# Patient Record
Sex: MALE | Race: WHITE | NOT HISPANIC OR LATINO | Employment: FULL TIME | URBAN - METROPOLITAN AREA
[De-identification: names, ages, dates, MRNs, and addresses within clinical notes are randomized per-mention and may not be internally consistent; named-entity substitution may affect disease eponyms.]

---

## 2017-02-01 ENCOUNTER — OFFICE VISIT (OUTPATIENT)
Dept: CARDIOLOGY | Facility: CLINIC | Age: 57
End: 2017-02-01

## 2017-02-01 VITALS
DIASTOLIC BLOOD PRESSURE: 60 MMHG | BODY MASS INDEX: 31.3 KG/M2 | HEIGHT: 70 IN | HEART RATE: 80 BPM | SYSTOLIC BLOOD PRESSURE: 90 MMHG | WEIGHT: 218.6 LBS

## 2017-02-01 DIAGNOSIS — I42.9 CARDIOMYOPATHY (HCC): Primary | ICD-10-CM

## 2017-02-01 DIAGNOSIS — I10 ESSENTIAL HYPERTENSION: ICD-10-CM

## 2017-02-01 DIAGNOSIS — E78.00 PURE HYPERCHOLESTEROLEMIA: ICD-10-CM

## 2017-02-01 PROCEDURE — 99213 OFFICE O/P EST LOW 20 MIN: CPT | Performed by: INTERNAL MEDICINE

## 2017-02-01 PROCEDURE — 93289 INTERROG DEVICE EVAL HEART: CPT | Performed by: INTERNAL MEDICINE

## 2017-02-01 NOTE — PROGRESS NOTES
"Subjective:     Encounter Date:02/01/2017      Patient ID: Arash Simmons is a 56 y.o. male.    Chief Complaint: Follow up of cardiomyopathy    1. Nonischemic cardiomyopathy:  a.  On 04/07/2015, abnormal myocardial perfusion study with evidence of dilated cardiomyopathy, ejection fraction of 16%, large fixed perfusion defect in the anterior apex, consistent with prior myocardial infarction.   b. On 05/08/2015, cardiac catheterization  with EF of 10% to 15%.  Normal coronary arteries.  Severe left ventricular dilatation.    c. Echo, 07/08/2015, LVEF 20%.   d. Status post biventricular ICD placement, August 2015.  2. History of congestive heart failure:  a.  By patient description, a questionable history of nonischemic cardiomyopathy, incomplete database.   3.  Left bundle branch block.   4. Hypertension.   5. Dyslipidemia.   6. Anxiety.   7. Questionable sleep apnea.   8. Abdominal/intestinal surgery as a child.     History of Present Illness  Patient returns today for annual follow-up of nonischemic cardio myopathy and by the ICD check.  Since her last visit, he is doing very well, his active.  Is limited still by his knee pain, but \"is able to tolerate it\".  No dizziness lightheadedness chest pain shortness breath orthopnea PND palpitations or any cardiovascular symptoms.    No Known Allergies      Current Outpatient Prescriptions:   •  aspirin 81 MG tablet, Take  by mouth daily., Disp: , Rfl:   •  atorvastatin (LIPITOR) 10 MG tablet, Take  by mouth., Disp: , Rfl:   •  carvedilol (COREG) 12.5 MG tablet, Take 1 tablet by mouth 2 (Two) Times a Day With Meals., Disp: 180 tablet, Rfl: 3  •  Cholecalciferol (VITAMIN D3) 2000 UNITS capsule, Take 1 capsule by mouth daily., Disp: , Rfl:   •  FLUoxetine (PROzac) 20 MG tablet, Take 1 tablet by mouth daily., Disp: 90 tablet, Rfl: 1  •  fluticasone (FLOVENT HFA) 110 MCG/ACT inhaler, Inhale 1 puff 2 (two) times a day., Disp: 1 inhaler, Rfl: 0  •  furosemide (LASIX) 40 MG " "tablet, TAKE ONE TABLET BY MOUTH ONCE DAILY, Disp: 90 tablet, Rfl: 3  •  ramipril (ALTACE) 2.5 MG capsule, TAKE ONE CAPSULE BY MOUTH TWICE DAILY, Disp: 180 capsule, Rfl: 2  •  spironolactone (ALDACTONE) 25 MG tablet, Take  by mouth daily., Disp: , Rfl:     The following portions of the patient's history were reviewed and updated as appropriate: allergies, current medications, past family history, past medical history, past social history, past surgical history and problem list.    Review of Systems   Constitution: Negative.   Cardiovascular: Negative.    Respiratory: Negative.    Hematologic/Lymphatic: Negative for bleeding problem. Does not bruise/bleed easily.   Skin: Negative for rash.   Musculoskeletal: Negative for muscle weakness and myalgias.   Gastrointestinal: Negative for heartburn, nausea and vomiting.   Neurological: Negative.           Objective:   Blood pressure 90/60, pulse 80, height 70\" (177.8 cm), weight 218 lb 9.6 oz (99.2 kg).      Physical Exam   Constitutional: He is oriented to person, place, and time. He appears well-developed and well-nourished.   Neck: No JVD present. Carotid bruit is not present. No thyromegaly present.   Cardiovascular: Regular rhythm, S1 normal, S2 normal, normal heart sounds and intact distal pulses.  Exam reveals no gallop, no S3 and no S4.    No murmur heard.  Pulses:       Carotid pulses are 2+ on the right side, and 2+ on the left side.       Radial pulses are 2+ on the right side, and 2+ on the left side.   Pulmonary/Chest: Breath sounds normal.   Abdominal: Soft. Bowel sounds are normal. He exhibits no mass. There is no tenderness.   Musculoskeletal: He exhibits edema (Venous varicosities bilateral).   Neurological: He is alert and oriented to person, place, and time.   Skin: Skin is warm and dry. No rash noted.       Lab Review:    Procedures  Biventricular ICD: Normal function..  Rate histograms.  No significant arrhythmias.  No changes      Assessment: "   Arash was seen today for follow-up and hypertension.    Diagnoses and all orders for this visit:    Cardiomyopathy    Essential hypertension    Pure hypercholesterolemia        Impression  1. Nonischemic cardio myopathy.  Currently euvolemic functional class I  2. Hypertension well controlled  3. Dyslipidemia controlled  4. Normal functioning by the ICD    Plan:  1. No change in medications today.  Patient is stable  2. Check CMP and fasting lipids  3. Revisit in 16 MO, or sooner as needed.    Pb Naqvi MD

## 2017-02-02 ENCOUNTER — LAB (OUTPATIENT)
Dept: LAB | Facility: HOSPITAL | Age: 57
End: 2017-02-02

## 2017-02-02 DIAGNOSIS — E78.00 PURE HYPERCHOLESTEROLEMIA: ICD-10-CM

## 2017-02-02 DIAGNOSIS — I10 ESSENTIAL HYPERTENSION: ICD-10-CM

## 2017-02-02 LAB
ALBUMIN SERPL-MCNC: 4.5 G/DL (ref 3.2–4.8)
ALBUMIN/GLOB SERPL: 1.5 G/DL (ref 1.5–2.5)
ALP SERPL-CCNC: 68 U/L (ref 25–100)
ALT SERPL W P-5'-P-CCNC: 20 U/L (ref 7–40)
ANION GAP SERPL CALCULATED.3IONS-SCNC: 4 MMOL/L (ref 3–11)
ARTICHOKE IGE QN: 80 MG/DL (ref 0–130)
AST SERPL-CCNC: 24 U/L (ref 0–33)
BILIRUB SERPL-MCNC: 0.9 MG/DL (ref 0.3–1.2)
BUN BLD-MCNC: 17 MG/DL (ref 9–23)
BUN/CREAT SERPL: 18.9 (ref 7–25)
CALCIUM SPEC-SCNC: 10 MG/DL (ref 8.7–10.4)
CHLORIDE SERPL-SCNC: 106 MMOL/L (ref 99–109)
CHOLEST SERPL-MCNC: 139 MG/DL (ref 0–200)
CO2 SERPL-SCNC: 31 MMOL/L (ref 20–31)
CREAT BLD-MCNC: 0.9 MG/DL (ref 0.6–1.3)
GFR SERPL CREATININE-BSD FRML MDRD: 88 ML/MIN/1.73
GLOBULIN UR ELPH-MCNC: 3.1 GM/DL
GLUCOSE BLD-MCNC: 104 MG/DL (ref 70–100)
HDLC SERPL-MCNC: 40 MG/DL (ref 40–60)
POTASSIUM BLD-SCNC: 4.4 MMOL/L (ref 3.5–5.5)
PROT SERPL-MCNC: 7.6 G/DL (ref 5.7–8.2)
SODIUM BLD-SCNC: 141 MMOL/L (ref 132–146)
TRIGL SERPL-MCNC: 107 MG/DL (ref 0–150)

## 2017-02-02 PROCEDURE — 80053 COMPREHEN METABOLIC PANEL: CPT | Performed by: INTERNAL MEDICINE

## 2017-02-02 PROCEDURE — 80061 LIPID PANEL: CPT | Performed by: INTERNAL MEDICINE

## 2017-02-20 RX ORDER — ATORVASTATIN CALCIUM 10 MG/1
TABLET, FILM COATED ORAL
Qty: 14 TABLET | Refills: 0 | Status: SHIPPED | OUTPATIENT
Start: 2017-02-20 | End: 2017-03-16 | Stop reason: SDUPTHER

## 2017-03-06 ENCOUNTER — OFFICE VISIT (OUTPATIENT)
Dept: FAMILY MEDICINE CLINIC | Facility: CLINIC | Age: 57
End: 2017-03-06

## 2017-03-06 VITALS
BODY MASS INDEX: 31.55 KG/M2 | DIASTOLIC BLOOD PRESSURE: 65 MMHG | SYSTOLIC BLOOD PRESSURE: 122 MMHG | RESPIRATION RATE: 16 BRPM | WEIGHT: 220.4 LBS | HEART RATE: 76 BPM | HEIGHT: 70 IN | TEMPERATURE: 98 F

## 2017-03-06 DIAGNOSIS — H61.23 BILATERAL IMPACTED CERUMEN: Primary | ICD-10-CM

## 2017-03-06 DIAGNOSIS — F41.9 ANXIETY: ICD-10-CM

## 2017-03-06 PROCEDURE — 99213 OFFICE O/P EST LOW 20 MIN: CPT | Performed by: PHYSICIAN ASSISTANT

## 2017-03-06 PROCEDURE — 69209 REMOVE IMPACTED EAR WAX UNI: CPT | Performed by: PHYSICIAN ASSISTANT

## 2017-03-06 RX ORDER — FLUOXETINE 20 MG/1
20 TABLET, FILM COATED ORAL DAILY
Qty: 90 TABLET | Refills: 1 | Status: SHIPPED | OUTPATIENT
Start: 2017-03-06 | End: 2017-03-21 | Stop reason: SDUPTHER

## 2017-03-06 NOTE — PROGRESS NOTES
"Samuel Simmons is a 56 y.o. male.     History of Present Illness   Patient presents with CC of decreased hearing and ears feeling blocked. Pt has hx of cerumen impaction that requires ear cleaning. Pt does not use Q-tips or ear plugs that would exacerbate impactions. No symptoms of congestion, drainage, sore throat, HA, sinus pressure, cough or fever   Pt also needs refill of Prozac for anxiety. Doing well on medication. No concerns.     The following portions of the patient's history were reviewed and updated as appropriate: allergies, current medications, past family history, past medical history, past social history, past surgical history and problem list.    Review of Systems   Constitutional: Negative.  Negative for chills, diaphoresis, fatigue and fever.   HENT: Positive for hearing loss. Negative for congestion, ear discharge, ear pain, nosebleeds, postnasal drip, sinus pressure, sneezing and sore throat.         Ears feel \"full\"   Eyes: Negative.    Respiratory: Negative.  Negative for cough, chest tightness, shortness of breath and wheezing.    Cardiovascular: Negative.  Negative for chest pain, palpitations and leg swelling.   Gastrointestinal: Negative for abdominal distention, abdominal pain, anal bleeding, blood in stool, constipation, diarrhea, nausea, rectal pain and vomiting.   Endocrine: Negative.  Negative for cold intolerance, heat intolerance, polydipsia, polyphagia and polyuria.   Genitourinary: Negative.  Negative for difficulty urinating, dysuria, flank pain, frequency, hematuria and urgency.   Musculoskeletal: Negative.  Negative for arthralgias, back pain, gait problem, joint swelling, myalgias, neck pain and neck stiffness.   Skin: Negative.  Negative for color change, pallor, rash and wound.   Allergic/Immunologic: Negative.  Negative for immunocompromised state.   Neurological: Negative for dizziness, syncope, weakness, light-headedness, numbness and headaches. " "  Hematological: Negative.  Negative for adenopathy. Does not bruise/bleed easily.   Psychiatric/Behavioral: Negative.  Negative for behavioral problems, confusion, self-injury, sleep disturbance and suicidal ideas. The patient is not nervous/anxious.        Objective    Blood pressure 122/65, pulse 76, temperature 98 °F (36.7 °C), resp. rate 16, height 70\" (177.8 cm), weight 220 lb 6.4 oz (100 kg).     Physical Exam   Constitutional: He is oriented to person, place, and time. He appears well-developed and well-nourished.   HENT:   Head: Normocephalic and atraumatic.   Right Ear: Tympanic membrane, external ear and ear canal normal.   Left Ear: Tympanic membrane, external ear and ear canal normal.   Nose: Nose normal.   Mouth/Throat: Oropharynx is clear and moist. No oropharyngeal exudate.   TMs and canals normal post lavage due to bilateral cerumen impaction    Eyes: Conjunctivae and EOM are normal. Pupils are equal, round, and reactive to light.   Neck: Normal range of motion. Neck supple. No tracheal deviation present. No thyromegaly present.   Cardiovascular: Normal rate, regular rhythm, normal heart sounds and intact distal pulses.    Pacemaker device palpated    Pulmonary/Chest: Effort normal and breath sounds normal. No respiratory distress. He has no wheezes. He has no rales. He exhibits no tenderness.   Lymphadenopathy:     He has no cervical adenopathy.   Neurological: He is alert and oriented to person, place, and time.   Skin: Skin is warm and dry.   Psychiatric: He has a normal mood and affect. His behavior is normal. Judgment and thought content normal.       Ear Cerumen Removal Lavage  Date/Time: 3/6/2017 4:32 PM  Performed by: ELVIS WALSH  Authorized by: PAYAM MCKEON   Consent: Verbal consent obtained. Written consent not obtained.  Risks and benefits: risks, benefits and alternatives were discussed  Consent given by: patient  Patient understanding: patient states understanding of the " procedure being performed  Patient consent: the patient's understanding of the procedure matches consent given  Procedure consent: procedure consent matches procedure scheduled  Patient identity confirmed: verbally with patient    Anesthesia:  Local Anesthetic: none   Ceruminolytics applied: Ceruminolytics applied prior to the procedure.  Location: both ears.  Procedure type: irrigation  Sedation:  Patient sedated: no    Patient tolerance: Patient tolerated the procedure well with no immediate complications  Comments: Successful ear cerumen removal bilaterally         Assessment/Plan   Arash was seen today for l ear feels blocked and rf:  prozac.    Diagnoses and all orders for this visit:    Bilateral impacted cerumen  -     Ear Cerumen Removal Lavage    Anxiety  -     FLUoxetine (PROzac) 20 MG tablet; Take 1 tablet by mouth Daily.      Refill on prozac provided   Suggest regular use of Debrox to avoid impactions. No Q-tips.   F/U PRN

## 2017-03-17 RX ORDER — ATORVASTATIN CALCIUM 10 MG/1
TABLET, FILM COATED ORAL
Qty: 30 TABLET | Refills: 0 | Status: SHIPPED | OUTPATIENT
Start: 2017-03-17 | End: 2017-03-21 | Stop reason: SDUPTHER

## 2017-03-21 ENCOUNTER — OFFICE VISIT (OUTPATIENT)
Dept: FAMILY MEDICINE CLINIC | Facility: CLINIC | Age: 57
End: 2017-03-21

## 2017-03-21 VITALS
HEIGHT: 70 IN | HEART RATE: 60 BPM | BODY MASS INDEX: 30.92 KG/M2 | WEIGHT: 216 LBS | RESPIRATION RATE: 20 BRPM | SYSTOLIC BLOOD PRESSURE: 124 MMHG | TEMPERATURE: 96.8 F | DIASTOLIC BLOOD PRESSURE: 70 MMHG

## 2017-03-21 DIAGNOSIS — Z12.11 SCREEN FOR COLON CANCER: ICD-10-CM

## 2017-03-21 DIAGNOSIS — F41.9 ANXIETY: ICD-10-CM

## 2017-03-21 DIAGNOSIS — I10 ESSENTIAL HYPERTENSION: Primary | ICD-10-CM

## 2017-03-21 DIAGNOSIS — R05.9 COUGH: ICD-10-CM

## 2017-03-21 DIAGNOSIS — E78.00 PURE HYPERCHOLESTEROLEMIA: ICD-10-CM

## 2017-03-21 DIAGNOSIS — R14.0 ABDOMINAL BLOATING: ICD-10-CM

## 2017-03-21 DIAGNOSIS — I50.22 CHRONIC SYSTOLIC CONGESTIVE HEART FAILURE (HCC): ICD-10-CM

## 2017-03-21 PROBLEM — M17.9 OSTEOARTHRITIS OF KNEE: Status: ACTIVE | Noted: 2017-03-21

## 2017-03-21 PROCEDURE — 99214 OFFICE O/P EST MOD 30 MIN: CPT | Performed by: FAMILY MEDICINE

## 2017-03-21 RX ORDER — CARVEDILOL 12.5 MG/1
12.5 TABLET ORAL 2 TIMES DAILY WITH MEALS
Qty: 180 TABLET | Refills: 1 | Status: SHIPPED | OUTPATIENT
Start: 2017-03-21 | End: 2018-01-02 | Stop reason: SDUPTHER

## 2017-03-21 RX ORDER — BENZONATATE 200 MG/1
200 CAPSULE ORAL 3 TIMES DAILY PRN
Qty: 30 CAPSULE | Refills: 0 | Status: SHIPPED | OUTPATIENT
Start: 2017-03-21 | End: 2017-08-16

## 2017-03-21 RX ORDER — RAMIPRIL 2.5 MG/1
2.5 CAPSULE ORAL 2 TIMES DAILY
Qty: 180 CAPSULE | Refills: 1 | Status: SHIPPED | OUTPATIENT
Start: 2017-03-21 | End: 2017-09-07 | Stop reason: ALTCHOICE

## 2017-03-21 RX ORDER — ATORVASTATIN CALCIUM 10 MG/1
10 TABLET, FILM COATED ORAL NIGHTLY
Qty: 90 TABLET | Refills: 1 | Status: SHIPPED | OUTPATIENT
Start: 2017-03-21 | End: 2017-12-26 | Stop reason: SDUPTHER

## 2017-03-21 RX ORDER — FLUOXETINE 20 MG/1
20 TABLET, FILM COATED ORAL DAILY
Qty: 90 TABLET | Refills: 1 | Status: SHIPPED | OUTPATIENT
Start: 2017-03-21 | End: 2018-05-01 | Stop reason: SDUPTHER

## 2017-03-21 RX ORDER — SPIRONOLACTONE 25 MG/1
25 TABLET ORAL DAILY
Qty: 90 TABLET | Refills: 1 | Status: SHIPPED | OUTPATIENT
Start: 2017-03-21 | End: 2017-11-28 | Stop reason: SDUPTHER

## 2017-03-21 NOTE — PROGRESS NOTES
Subjective   Arash Simmons is a 56 y.o. male.     History of Present Illness     He has been congested and hd a cough for the last 5 days  Little tickle in his throat  Worse in the AM with congestion  No SOA and no fever    Arash Simmons  is here for follow-up of hypertension of several years duration. He is not exercising and is not adherent to a low-salt diet. Patient does check his blood pressure. It is well controlled at home. Patient denies chest pain and palpitations. He is compliant with meds.        Arash Simmons returns today for follow up of Hyperlipidemia with a lipid program recheck.   Arash indicates his exercise level as not at all.  Diet: not watching what he eats  Patient is compliant with medications   Any side effects to medications:   chest pain No myalgia No memory change No  Pt is not due for labs, had them done 2/2/17 and reviewed with pt today    His mood has been doing great on the prozac  no complaints and no issues    He also notes abdominal bloating nothing makes it better nor worse    The following portions of the patient's history were reviewed and updated as appropriate: allergies, current medications, past family history, past medical history, past social history, past surgical history and problem list.    Review of Systems   Constitutional: Negative.  Negative for fever.   HENT: Positive for congestion.    Eyes: Negative.    Respiratory: Positive for cough.    Cardiovascular: Negative.    Gastrointestinal:        Abdominal bloating   Musculoskeletal: Negative.    Skin: Negative.    Neurological: Negative.    Psychiatric/Behavioral: Negative.    All other systems reviewed and are negative.      Objective   Physical Exam   Constitutional: He is oriented to person, place, and time. He appears well-developed and well-nourished. No distress.   HENT:   Head: Normocephalic and atraumatic.   Right Ear: Tympanic membrane, external ear and ear canal normal.   Left Ear: Tympanic  membrane, external ear and ear canal normal.   Nose: Nose normal.   Mouth/Throat: Uvula is midline and oropharynx is clear and moist.   Eyes: Conjunctivae and EOM are normal.   Neck: Normal range of motion. Neck supple. No thyromegaly present.   Cardiovascular: Normal rate, regular rhythm and normal heart sounds.    No murmur heard.  Pulmonary/Chest: Effort normal and breath sounds normal. No respiratory distress.   Abdominal: Soft. Bowel sounds are normal. He exhibits no distension and no mass. There is no tenderness.   Lymphadenopathy:     He has no cervical adenopathy.   Neurological: He is alert and oriented to person, place, and time.   Skin: Skin is warm and dry.   Psychiatric: He has a normal mood and affect. His behavior is normal. Judgment and thought content normal.   Nursing note and vitals reviewed.      Assessment/Plan   Arash was seen today for hyperlipidemia, hypertension, irritable, cough, nasal congestion and bloated.    Diagnoses and all orders for this visit:    Essential hypertension  -     carvedilol (COREG) 12.5 MG tablet; Take 1 tablet by mouth 2 (Two) Times a Day With Meals.  -     ramipril (ALTACE) 2.5 MG capsule; Take 1 capsule by mouth 2 (Two) Times a Day.  -     spironolactone (ALDACTONE) 25 MG tablet; Take 1 tablet by mouth Daily.    Anxiety  -     FLUoxetine (PROzac) 20 MG tablet; Take 1 tablet by mouth Daily.    Pure hypercholesterolemia  -     atorvastatin (LIPITOR) 10 MG tablet; Take 1 tablet by mouth Every Night.    Chronic systolic congestive heart failure  -     carvedilol (COREG) 12.5 MG tablet; Take 1 tablet by mouth 2 (Two) Times a Day With Meals.  -     ramipril (ALTACE) 2.5 MG capsule; Take 1 capsule by mouth 2 (Two) Times a Day.  -     spironolactone (ALDACTONE) 25 MG tablet; Take 1 tablet by mouth Daily.    Screen for colon cancer  -     Ambulatory Referral For Screening Colonoscopy    Cough  -     Chlorcyclizine-Pseudoephed 25-60 MG tablet; 1/2-1 po q 8 hours PRN  -      benzonatate (TESSALON) 200 MG capsule; Take 1 capsule by mouth 3 (Three) Times a Day As Needed for Cough.    Abdominal bloating    BP controlled very well, no change in meds and labs requested.  Mood has been doing great, continue prozac  Not fasting today so unable to check lipids, statin refiled  CHF stable, no change in medicine  Will use stahist and tessalon perles for URI, call INB  Probiotic for bloating and will get screening colonoscopy

## 2017-05-17 ENCOUNTER — CLINICAL SUPPORT NO REQUIREMENTS (OUTPATIENT)
Dept: CARDIOLOGY | Facility: CLINIC | Age: 57
End: 2017-05-17

## 2017-05-17 DIAGNOSIS — I42.9 CARDIOMYOPATHY (HCC): ICD-10-CM

## 2017-05-17 PROCEDURE — 93296 REM INTERROG EVL PM/IDS: CPT | Performed by: INTERNAL MEDICINE

## 2017-05-17 PROCEDURE — 93295 DEV INTERROG REMOTE 1/2/MLT: CPT | Performed by: INTERNAL MEDICINE

## 2017-08-16 ENCOUNTER — OFFICE VISIT (OUTPATIENT)
Dept: CARDIOLOGY | Facility: CLINIC | Age: 57
End: 2017-08-16

## 2017-08-16 VITALS
WEIGHT: 220.6 LBS | HEIGHT: 70 IN | DIASTOLIC BLOOD PRESSURE: 76 MMHG | BODY MASS INDEX: 31.58 KG/M2 | HEART RATE: 72 BPM | SYSTOLIC BLOOD PRESSURE: 122 MMHG

## 2017-08-16 DIAGNOSIS — E78.00 PURE HYPERCHOLESTEROLEMIA: ICD-10-CM

## 2017-08-16 DIAGNOSIS — R06.02 SOB (SHORTNESS OF BREATH): Primary | ICD-10-CM

## 2017-08-16 DIAGNOSIS — I10 ESSENTIAL HYPERTENSION: ICD-10-CM

## 2017-08-16 DIAGNOSIS — Z01.810 PREOP CARDIOVASCULAR EXAM: ICD-10-CM

## 2017-08-16 DIAGNOSIS — R06.02 SHORTNESS OF BREATH: ICD-10-CM

## 2017-08-16 DIAGNOSIS — I42.9 CARDIOMYOPATHY (HCC): ICD-10-CM

## 2017-08-16 DIAGNOSIS — R06.83 SNORING: ICD-10-CM

## 2017-08-16 PROCEDURE — 93284 PRGRMG EVAL IMPLANTABLE DFB: CPT | Performed by: INTERNAL MEDICINE

## 2017-08-16 PROCEDURE — 99214 OFFICE O/P EST MOD 30 MIN: CPT | Performed by: INTERNAL MEDICINE

## 2017-08-16 NOTE — PROGRESS NOTES
"Subjective:     Encounter Date:08/16/2017      Patient ID: Arash Simmons is a 57 y.o. male.    Chief Complaint: Chronic systolic congestive heart failure; Hypertension; Hyperlipidemia; Fatigue; and Shortness of Breath      PROBLEM LIST:  1. Nonischemic cardiomyopathy:  a.  On 04/07/2015, abnormal myocardial perfusion study with evidence of dilated cardiomyopathy, ejection fraction of 16%, large fixed perfusion defect in the anterior apex, consistent with prior myocardial infarction.   b. On 05/08/2015, cardiac catheterization  with EF of 10% to 15%.  Normal coronary arteries.  Severe left ventricular dilatation.    c. Echo, 07/08/2015, LVEF 20%.   d. Status post biventricular ICD placement, August 2015.  2.  Left bundle branch block.   3. Hypertension.   4. Dyslipidemia.   5. Anxiety.   6. Questionable sleep apnea.   7. Abdominal/intestinal surgery as a child.     History of Present Illness  Patient returns today for follow up with a history of Nonischemic cardiomyopathy.  Since her last visit patient denies any orthopnea chest pain PND presyncope syncope or palpitations.  His main complaint is of some persistent dyspnea on exertion which is functional class II-III.  He also notes he has significant malaise fatigue, and \"sleeps too much\".  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  He does continue to drink 3-4 alcoholic beverages nightly.  He thinks he is scheduled to undergo TKR at some point in the future..     No Known Allergies      Current Outpatient Prescriptions:   •  aspirin 81 MG tablet, Take  by mouth daily., Disp: , Rfl:   •  atorvastatin (LIPITOR) 10 MG tablet, Take 1 tablet by mouth Every Night., Disp: 90 tablet, Rfl: 1  •  carvedilol (COREG) 12.5 MG tablet, Take 1 tablet by mouth 2 (Two) Times a Day With Meals., Disp: 180 tablet, Rfl: 1  •  Cholecalciferol (VITAMIN D3) 2000 UNITS capsule, Take 1 capsule by mouth daily., Disp: , Rfl:   •  FLUoxetine (PROzac) 20 MG tablet, Take 1 tablet " "by mouth Daily., Disp: 90 tablet, Rfl: 1  •  furosemide (LASIX) 40 MG tablet, TAKE ONE TABLET BY MOUTH ONCE DAILY, Disp: 90 tablet, Rfl: 3  •  Probiotic Product (PROBIOTIC DAILY PO), Take  by mouth Daily., Disp: , Rfl:   •  ramipril (ALTACE) 2.5 MG capsule, Take 1 capsule by mouth 2 (Two) Times a Day., Disp: 180 capsule, Rfl: 1  •  spironolactone (ALDACTONE) 25 MG tablet, Take 1 tablet by mouth Daily., Disp: 90 tablet, Rfl: 1    The following portions of the patient's history were reviewed and updated as appropriate: allergies, current medications, past family history, past medical history, past social history, past surgical history and problem list.    Review of Systems   Constitution: Positive for malaise/fatigue and weight gain. Negative for weakness.   Cardiovascular: Negative.    Respiratory: Positive for shortness of breath.    Hematologic/Lymphatic: Negative for bleeding problem. Does not bruise/bleed easily.   Skin: Negative for rash.   Musculoskeletal: Negative for muscle weakness and myalgias.   Gastrointestinal: Positive for bloating. Negative for heartburn, nausea and vomiting.   Neurological: Negative.           Objective:   Blood pressure 122/76, pulse 72, height 70\" (177.8 cm), weight 220 lb 9.6 oz (100 kg).      Physical Exam   Constitutional: He is oriented to person, place, and time. He appears well-developed and well-nourished.   Neck: No JVD present. Carotid bruit is not present. No thyromegaly present.   Cardiovascular: Regular rhythm, S1 normal, S2 normal, normal heart sounds and intact distal pulses.  Exam reveals no gallop, no S3 and no S4.    No murmur heard.  Pulses:       Carotid pulses are 2+ on the right side, and 2+ on the left side.       Radial pulses are 2+ on the right side, and 2+ on the left side.   Pulmonary/Chest: Breath sounds normal.   Abdominal: Soft. Bowel sounds are normal. He exhibits no mass. There is no tenderness.   Musculoskeletal: He exhibits edema (Venous " varicosities bilateral).   Neurological: He is alert and oriented to person, place, and time.   Skin: Skin is warm and dry. No rash noted.       Lab Review:    Procedures  Bi-V ICD check: 99% biventricular paced, 24% atrial paced.  Normal thresholds and impedances.  No arrhythmias, no therapies delivered.      Assessment:   Arash was seen today for chronic systolic congestive heart failure, hypertension, hyperlipidemia, fatigue and shortness of breath.    Diagnoses and all orders for this visit:    SOB (shortness of breath)  -     Ambulatory Referral to Sleep Medicine  -     Adult Transthoracic Echo Complete; Future    Snoring  -     Ambulatory Referral to Sleep Medicine    Cardiomyopathy    Pure hypercholesterolemia    Essential hypertension    Shortness of breath    Preop cardiovascular exam        Impression  1. Nonischemic cardiomyopathy with persistent LVEF of 20% post bi-V ICD.  Appears euvolemic but concurrent functional class class II-III symptoms  2. Normal biventricular ICD function  3. Malaise fatigue, symptoms of sleep apnea.  4. Retention controlled  5. Dyslipidemia controlled on statin  6. Preop total knee replacement    Plan:  1. Outpatient sleep evaluation by Dr. Garcia.  2. We'll discontinue Altace, and after 36 hour washout.  Start Entresto 24/26 mg.  Samples given.  3. Patient does not have significant ordinary artery disease or angina.  He is at low to moderate cardiovascular risk of knee surgery, and would proceed without further testing.  4. Revisit in 6 MO, or sooner as needed.    Pb Naqvi MD

## 2017-09-18 ENCOUNTER — TELEPHONE (OUTPATIENT)
Dept: CARDIOLOGY | Facility: CLINIC | Age: 57
End: 2017-09-18

## 2017-09-18 NOTE — TELEPHONE ENCOUNTER
Notified patient that his Entresto 24-26 mg BID has been approved through his insurance from 9/14/17-9/14/18. Patient verbalized understanding.

## 2017-09-20 ENCOUNTER — APPOINTMENT (OUTPATIENT)
Dept: CARDIOLOGY | Facility: HOSPITAL | Age: 57
End: 2017-09-20
Attending: INTERNAL MEDICINE

## 2017-10-10 ENCOUNTER — TELEPHONE (OUTPATIENT)
Dept: FAMILY MEDICINE CLINIC | Facility: CLINIC | Age: 57
End: 2017-10-10

## 2017-10-10 NOTE — TELEPHONE ENCOUNTER
----- Message from Amber Estrada sent at 10/10/2017 11:53 AM EDT -----  Contact: Bertha  Lamar has questions regarding pre-op paperwork. It was supposed to be faxed to Dr. Love. Please call patient at [phone].

## 2017-10-11 ENCOUNTER — TELEPHONE (OUTPATIENT)
Dept: CARDIOLOGY | Facility: CLINIC | Age: 57
End: 2017-10-11

## 2017-10-11 ENCOUNTER — OFFICE VISIT (OUTPATIENT)
Dept: FAMILY MEDICINE CLINIC | Facility: CLINIC | Age: 57
End: 2017-10-11

## 2017-10-11 ENCOUNTER — HOSPITAL ENCOUNTER (OUTPATIENT)
Dept: GENERAL RADIOLOGY | Facility: HOSPITAL | Age: 57
Discharge: HOME | End: 2017-10-11
Admitting: FAMILY MEDICINE

## 2017-10-11 VITALS
HEART RATE: 78 BPM | BODY MASS INDEX: 31.64 KG/M2 | SYSTOLIC BLOOD PRESSURE: 124 MMHG | WEIGHT: 221 LBS | RESPIRATION RATE: 18 BRPM | HEIGHT: 70 IN | TEMPERATURE: 98 F | DIASTOLIC BLOOD PRESSURE: 82 MMHG

## 2017-10-11 DIAGNOSIS — I10 ESSENTIAL HYPERTENSION: ICD-10-CM

## 2017-10-11 DIAGNOSIS — M17.12 PRIMARY OSTEOARTHRITIS OF LEFT KNEE: ICD-10-CM

## 2017-10-11 DIAGNOSIS — Z01.818 PRE-OP EVALUATION: Primary | ICD-10-CM

## 2017-10-11 DIAGNOSIS — I50.22 CHRONIC SYSTOLIC CONGESTIVE HEART FAILURE (HCC): ICD-10-CM

## 2017-10-11 LAB
ALBUMIN SERPL-MCNC: 4.1 G/DL (ref 3.2–4.8)
ALBUMIN/GLOB SERPL: 1.6 G/DL (ref 1.5–2.5)
ALP SERPL-CCNC: 76 U/L (ref 25–100)
ALT SERPL-CCNC: 12 U/L (ref 7–40)
AST SERPL-CCNC: 18 U/L (ref 0–33)
BASOPHILS # BLD AUTO: 0.05 10*3/MM3 (ref 0–0.2)
BASOPHILS NFR BLD AUTO: 0.7 % (ref 0–1)
BILIRUB BLD-MCNC: NEGATIVE MG/DL
BILIRUB SERPL-MCNC: 0.7 MG/DL (ref 0.3–1.2)
BUN SERPL-MCNC: 15 MG/DL (ref 9–23)
BUN/CREAT SERPL: 16.7 (ref 7–25)
CALCIUM SERPL-MCNC: 9.1 MG/DL (ref 8.7–10.4)
CHLORIDE SERPL-SCNC: 112 MMOL/L (ref 99–109)
CLARITY, POC: CLEAR
CO2 SERPL-SCNC: 25 MMOL/L (ref 20–31)
COLOR UR: YELLOW
CREAT SERPL-MCNC: 0.9 MG/DL (ref 0.6–1.3)
EOSINOPHIL # BLD AUTO: 0.24 10*3/MM3 (ref 0–0.3)
EOSINOPHIL NFR BLD AUTO: 3.4 % (ref 0–3)
ERYTHROCYTE [DISTWIDTH] IN BLOOD BY AUTOMATED COUNT: 13.1 % (ref 11.3–14.5)
GLOBULIN SER CALC-MCNC: 2.5 GM/DL
GLUCOSE SERPL-MCNC: 93 MG/DL (ref 70–100)
GLUCOSE UR STRIP-MCNC: NEGATIVE MG/DL
HCT VFR BLD AUTO: 39.6 % (ref 38.9–50.9)
HGB BLD-MCNC: 12.8 G/DL (ref 13.1–17.5)
IMM GRANULOCYTES # BLD: 0.01 10*3/MM3 (ref 0–0.03)
IMM GRANULOCYTES NFR BLD: 0.1 % (ref 0–0.6)
INR PPP: 0.99
KETONES UR QL: NEGATIVE
LEUKOCYTE EST, POC: NEGATIVE
LYMPHOCYTES # BLD AUTO: 2.13 10*3/MM3 (ref 0.6–4.8)
LYMPHOCYTES NFR BLD AUTO: 30.3 % (ref 24–44)
MCH RBC QN AUTO: 29.8 PG (ref 27–31)
MCHC RBC AUTO-ENTMCNC: 32.3 G/DL (ref 32–36)
MCV RBC AUTO: 92.3 FL (ref 80–99)
MONOCYTES # BLD AUTO: 0.76 10*3/MM3 (ref 0–1)
MONOCYTES NFR BLD AUTO: 10.8 % (ref 0–12)
NEUTROPHILS # BLD AUTO: 3.83 10*3/MM3 (ref 1.5–8.3)
NEUTROPHILS NFR BLD AUTO: 54.7 % (ref 41–71)
NITRITE UR-MCNC: NEGATIVE MG/ML
PH UR: 6 [PH] (ref 5–8)
PLATELET # BLD AUTO: 265 10*3/MM3 (ref 150–450)
POTASSIUM SERPL-SCNC: 4.6 MMOL/L (ref 3.5–5.5)
PROT SERPL-MCNC: 6.6 G/DL (ref 5.7–8.2)
PROT UR STRIP-MCNC: NEGATIVE MG/DL
PROTHROMBIN TIME: 10.8 SECONDS (ref 9.6–11.5)
RBC # BLD AUTO: 4.29 10*6/MM3 (ref 4.2–5.76)
RBC # UR STRIP: NEGATIVE /UL
SODIUM SERPL-SCNC: 142 MMOL/L (ref 132–146)
SP GR UR: 1.03 (ref 1–1.03)
UROBILINOGEN UR QL: NORMAL
WBC # BLD AUTO: 7.02 10*3/MM3 (ref 3.5–10.8)

## 2017-10-11 PROCEDURE — 99214 OFFICE O/P EST MOD 30 MIN: CPT | Performed by: FAMILY MEDICINE

## 2017-10-11 PROCEDURE — 81003 URINALYSIS AUTO W/O SCOPE: CPT | Performed by: FAMILY MEDICINE

## 2017-10-11 PROCEDURE — 3008F BODY MASS INDEX DOCD: CPT | Performed by: FAMILY MEDICINE

## 2017-10-11 PROCEDURE — 71020 HC CHEST PA AND LATERAL: CPT

## 2017-10-11 NOTE — TELEPHONE ENCOUNTER
Patient called to let us know he is having surgery on 10/25 with Dr. Posadas with [facility]. Patient is clear at low-moderate CV risk per Dr. Naqvi's last office note. Will fax clearance letter to their office at [phone].

## 2017-10-13 ENCOUNTER — OFFICE VISIT (OUTPATIENT)
Dept: FAMILY MEDICINE CLINIC | Facility: CLINIC | Age: 57
End: 2017-10-13

## 2017-10-13 VITALS
HEIGHT: 70 IN | WEIGHT: 216.8 LBS | TEMPERATURE: 98.7 F | SYSTOLIC BLOOD PRESSURE: 140 MMHG | OXYGEN SATURATION: 92 % | HEART RATE: 88 BPM | DIASTOLIC BLOOD PRESSURE: 70 MMHG | RESPIRATION RATE: 24 BRPM | BODY MASS INDEX: 31.04 KG/M2

## 2017-10-13 DIAGNOSIS — J98.01 COUGH DUE TO BRONCHOSPASM: ICD-10-CM

## 2017-10-13 DIAGNOSIS — R68.89 FLU-LIKE SYMPTOMS: Primary | ICD-10-CM

## 2017-10-13 DIAGNOSIS — J10.1 INFLUENZA A: ICD-10-CM

## 2017-10-13 LAB
EXPIRATION DATE: ABNORMAL
FLUAV AG NPH QL: POSITIVE
FLUBV AG NPH QL: NEGATIVE
INTERNAL CONTROL: ABNORMAL
Lab: ABNORMAL

## 2017-10-13 PROCEDURE — 87804 INFLUENZA ASSAY W/OPTIC: CPT | Performed by: NURSE PRACTITIONER

## 2017-10-13 PROCEDURE — 94640 AIRWAY INHALATION TREATMENT: CPT | Performed by: NURSE PRACTITIONER

## 2017-10-13 PROCEDURE — 99214 OFFICE O/P EST MOD 30 MIN: CPT | Performed by: NURSE PRACTITIONER

## 2017-10-13 RX ORDER — OSELTAMIVIR PHOSPHATE 75 MG/1
75 CAPSULE ORAL 2 TIMES DAILY
Qty: 10 CAPSULE | Refills: 0 | Status: SHIPPED | OUTPATIENT
Start: 2017-10-13 | End: 2018-01-08

## 2017-10-13 RX ORDER — AZITHROMYCIN 250 MG/1
TABLET, FILM COATED ORAL
Qty: 6 TABLET | Refills: 0 | Status: SHIPPED | OUTPATIENT
Start: 2017-10-13 | End: 2018-01-26

## 2017-10-13 RX ORDER — METHYLPREDNISOLONE 4 MG/1
TABLET ORAL
Qty: 21 TABLET | Refills: 0 | Status: SHIPPED | OUTPATIENT
Start: 2017-10-13 | End: 2018-01-26

## 2017-10-13 RX ORDER — ALBUTEROL SULFATE 90 UG/1
2 AEROSOL, METERED RESPIRATORY (INHALATION) EVERY 4 HOURS PRN
Qty: 1 INHALER | Refills: 0 | Status: SHIPPED | OUTPATIENT
Start: 2017-10-13 | End: 2018-02-21

## 2017-10-13 NOTE — PROGRESS NOTES
Subjective   Arash Simmons is a 57 y.o. male.     History of Present Illness   Fever chills body aches, HA, ST, cough, chest tightness that started yesterday  He feels like he has the flu  He felt so bad last night that he almost went to ER having trouble getting his breath due to dry hacking coughing fits  He is supposed to have surgery in a week  Taking OTC Ibuprofen and Mucinex but not helping  Very tired just laying around    The following portions of the patient's history were reviewed and updated as appropriate: allergies, current medications, past family history, past medical history, past social history, past surgical history and problem list.    Review of Systems   Constitutional: Positive for activity change, chills, fatigue and fever.   HENT: Positive for congestion and sore throat.    Eyes: Negative.    Respiratory: Positive for cough, chest tightness, shortness of breath and wheezing.    Cardiovascular: Negative.    Gastrointestinal: Negative.    Endocrine: Negative.    Genitourinary: Negative.    Musculoskeletal: Positive for myalgias (body aches all over).   Skin: Negative.    Neurological: Positive for headaches. Negative for dizziness and light-headedness.   Hematological: Negative.    Psychiatric/Behavioral: Positive for sleep disturbance (due to cough).       Objective   Physical Exam   Constitutional: He is oriented to person, place, and time. He appears well-developed and well-nourished. He appears ill.   HENT:   Head: Normocephalic.   Right Ear: Tympanic membrane, external ear and ear canal normal.   Left Ear: Tympanic membrane, external ear and ear canal normal.   Nose: Mucosal edema and rhinorrhea present. Right sinus exhibits no maxillary sinus tenderness and no frontal sinus tenderness. Left sinus exhibits no maxillary sinus tenderness and no frontal sinus tenderness.   Mouth/Throat: Uvula is midline. Posterior oropharyngeal erythema present.   Eyes: Conjunctivae are normal.   Neck: Neck  supple.   Cardiovascular: Normal rate, regular rhythm and normal heart sounds.    No murmur heard.  Pulmonary/Chest: Effort normal. No respiratory distress. He has decreased breath sounds (prior to neb treatment decreased breath sounds through out) in the right upper field, the right middle field, the right lower field, the left upper field, the left middle field and the left lower field. He has wheezes in the right lower field. He has no rhonchi. He has no rales.   Abdominal: Soft.   Musculoskeletal: Normal range of motion. He exhibits no edema.   Lymphadenopathy:        Head (right side): No tonsillar, no preauricular, no posterior auricular and no occipital adenopathy present.        Head (left side): No tonsillar, no preauricular, no posterior auricular and no occipital adenopathy present.     He has no cervical adenopathy.   Neurological: He is alert and oriented to person, place, and time.   Skin: Skin is warm, dry and intact. No cyanosis. Erythema: facial flushing. Nails show no clubbing.   Psychiatric: He has a normal mood and affect. His speech is normal and behavior is normal. Judgment and thought content normal. Cognition and memory are normal.   Nursing note and vitals reviewed.      Assessment/Plan   Arash was seen today for cough, uri and chills.    Diagnoses and all orders for this visit:    Flu-like symptoms  -     POCT Influenza A/B    Cough due to bronchospasm    Influenza A    Other orders  -     oseltamivir (TAMIFLU) 75 MG capsule; Take 1 capsule by mouth 2 (Two) Times a Day.  -     HYDROcod Polst-CPM Polst ER (TUSSIONEX PENNKINETIC) 10-8 MG/5ML ER suspension; Take 5 mL by mouth Every 12 (Twelve) Hours As Needed for Cough.  -     azithromycin (ZITHROMAX) 250 MG tablet; Take 2 tablets the first day, then 1 tablet daily for 4 days.  -     MethylPREDNISolone (MEDROL, VICTOR M,) 4 MG tablet; Take as directed on package instructions.  -     Fluticasone Furoate-Vilanterol (BREO ELLIPTA) 200-25 MCG/INH  aerosol powder ; Inhale 1 puff Daily.  -     albuterol (PROVENTIL HFA;VENTOLIN HFA) 108 (90 Base) MCG/ACT inhaler; Inhale 2 puffs Every 4 (Four) Hours As Needed for Wheezing or Shortness of Air.        Procedures  Albuterol Nebulizer 3 ml   Pre sat 92 HR 88  Post sat 92 HR 90  (sats improved to 98 % with deep breaths)  Air movement improved, productive cough about 15 minutes after nebulizer treatment    Flu A positive, Flu B negative  Will start pt on Tamiflu and instructed to be off work for 5 days or more if needed  Will have pt use Albuterol inhaler, gave sample of Breo 200 mcg 1x day- first inhalation in office.  Will start pt on Medrol dose shaista, to start Zpak if needed   Will see pt back if not improving or instructed to go to ER if difficulty breathing   Use OTC Mucinex and drink plenty of fluids and rest.  Use caution with cough syrup as it can cause drowsiness. Pt and his wife agrees.

## 2017-10-13 NOTE — PROGRESS NOTES
Spoke with pt and went over results/instructions. Verbalized understanding. Per Bianka, she will fax Pre-Op

## 2017-11-28 DIAGNOSIS — I10 ESSENTIAL HYPERTENSION: ICD-10-CM

## 2017-11-28 DIAGNOSIS — I50.22 CHRONIC SYSTOLIC CONGESTIVE HEART FAILURE (HCC): ICD-10-CM

## 2017-11-28 RX ORDER — SPIRONOLACTONE 25 MG/1
TABLET ORAL
Qty: 90 TABLET | Refills: 0 | Status: SHIPPED | OUTPATIENT
Start: 2017-11-28 | End: 2018-02-23 | Stop reason: SDUPTHER

## 2017-12-26 DIAGNOSIS — E78.00 PURE HYPERCHOLESTEROLEMIA: ICD-10-CM

## 2017-12-26 RX ORDER — ATORVASTATIN CALCIUM 10 MG/1
TABLET, FILM COATED ORAL
Qty: 90 TABLET | Refills: 0 | Status: SHIPPED | OUTPATIENT
Start: 2017-12-26 | End: 2018-02-23 | Stop reason: SDUPTHER

## 2018-01-02 DIAGNOSIS — I10 ESSENTIAL HYPERTENSION: ICD-10-CM

## 2018-01-02 DIAGNOSIS — I50.22 CHRONIC SYSTOLIC CONGESTIVE HEART FAILURE (HCC): ICD-10-CM

## 2018-01-02 RX ORDER — CARVEDILOL 12.5 MG/1
TABLET ORAL
Qty: 180 TABLET | Refills: 0 | Status: SHIPPED | OUTPATIENT
Start: 2018-01-02 | End: 2018-02-23 | Stop reason: SDUPTHER

## 2018-01-08 ENCOUNTER — OFFICE VISIT (OUTPATIENT)
Dept: FAMILY MEDICINE CLINIC | Facility: CLINIC | Age: 58
End: 2018-01-08

## 2018-01-08 VITALS
TEMPERATURE: 98 F | HEART RATE: 74 BPM | BODY MASS INDEX: 32.07 KG/M2 | RESPIRATION RATE: 18 BRPM | SYSTOLIC BLOOD PRESSURE: 114 MMHG | HEIGHT: 70 IN | DIASTOLIC BLOOD PRESSURE: 84 MMHG | WEIGHT: 224 LBS

## 2018-01-08 DIAGNOSIS — Z20.828 EXPOSURE TO THE FLU: ICD-10-CM

## 2018-01-08 DIAGNOSIS — G56.03 BILATERAL CARPAL TUNNEL SYNDROME: Primary | ICD-10-CM

## 2018-01-08 DIAGNOSIS — J10.1 INFLUENZA A: ICD-10-CM

## 2018-01-08 LAB
EXPIRATION DATE: ABNORMAL
FLUAV AG NPH QL: ABNORMAL
FLUBV AG NPH QL: ABNORMAL
INTERNAL CONTROL: ABNORMAL
Lab: ABNORMAL

## 2018-01-08 PROCEDURE — 99214 OFFICE O/P EST MOD 30 MIN: CPT | Performed by: FAMILY MEDICINE

## 2018-01-08 PROCEDURE — 87804 INFLUENZA ASSAY W/OPTIC: CPT | Performed by: FAMILY MEDICINE

## 2018-01-08 RX ORDER — PREDNISONE 20 MG/1
40 TABLET ORAL DAILY
Qty: 10 TABLET | Refills: 0 | Status: SHIPPED | OUTPATIENT
Start: 2018-01-08 | End: 2018-01-26

## 2018-01-08 NOTE — PROGRESS NOTES
Subjective   Arash Simmons is a 57 y.o. male.     History of Present Illness     His wife has the flu, diagnosed on saturday but pt had flu in October  He complains of cough, scratchy throat and feels tired  No achiness noted  No fevers but his wife has had high fevers    He complains of both hands feeling very tingly  Can get some tingling sensation with pressure on his left forearm  Almost every night he has to shake his hands out to get the numbness to calm down  This happens during the day as well  No known injury  Nothing in particular makes this better nor worse    The following portions of the patient's history were reviewed and updated as appropriate: allergies, current medications, past family history, past medical history, past social history, past surgical history and problem list.    Review of Systems   Constitutional: Negative.    HENT: Negative.    Eyes: Negative.    Respiratory: Negative.    Cardiovascular: Negative.    Gastrointestinal: Negative.    Musculoskeletal: Negative.    Skin: Negative.    Neurological:        Hpi   Psychiatric/Behavioral: Negative.    All other systems reviewed and are negative.      Objective   Physical Exam   Constitutional: He is oriented to person, place, and time. He appears well-developed and well-nourished.   HENT:   Head: Normocephalic and atraumatic.   Right Ear: Hearing, tympanic membrane, external ear and ear canal normal.   Left Ear: Hearing, tympanic membrane, external ear and ear canal normal.   Nose: Nose normal.   Mouth/Throat: Uvula is midline, oropharynx is clear and moist and mucous membranes are normal.   Eyes: Conjunctivae and EOM are normal.   Neck: Normal range of motion.   Cardiovascular: Normal rate, regular rhythm and normal heart sounds.    Pulmonary/Chest: Effort normal and breath sounds normal.   Lymphadenopathy:     He has no cervical adenopathy.   Neurological: He is alert and oriented to person, place, and time.   Negative tinnel + phalen  B.  Normal strength with    Psychiatric: He has a normal mood and affect. His behavior is normal. Judgment and thought content normal.   Nursing note and vitals reviewed.      Assessment/Plan   Arash was seen today for numbness.    Diagnoses and all orders for this visit:    Bilateral carpal tunnel syndrome  -     predniSONE (DELTASONE) 20 MG tablet; Take 2 tablets by mouth Daily.    Exposure to the flu  -     POC Influenza A / B    Influenza A  -     zanamivir (RELENZA) 5 MG/BLISTER inhaler; Inhale 2 puffs 2 (Two) Times a Day.    likely CTS BUE.  Will treat with nightly use of carpal tunnel splint and prednisone burst  relenza for flu as his insurance will not pay for tamiflu.  Fluids, rest, time, off work this week.  F/u as needed

## 2018-01-10 ENCOUNTER — TELEPHONE (OUTPATIENT)
Dept: FAMILY MEDICINE CLINIC | Facility: CLINIC | Age: 58
End: 2018-01-10

## 2018-01-10 NOTE — TELEPHONE ENCOUNTER
----- Message from Sridevi Mast sent at 1/10/2018  9:18 AM EST -----  Contact: DR CONNELL / PT   PT CALLED AND STATED THAT HE HAS BEEN TAKING TAMILFU AND HASNT RAN A FEVER.  HE FEELS NOW IT IS JUST A HEAD COLD.  HE IS REQUESTING IF IT IS OK FOR HIM TO GO BACK TO WORK ON 1/11/2018    PT CALL BACK [phone]

## 2018-01-25 ENCOUNTER — TELEPHONE (OUTPATIENT)
Dept: FAMILY MEDICINE CLINIC | Facility: CLINIC | Age: 58
End: 2018-01-25

## 2018-01-25 DIAGNOSIS — G56.03 BILATERAL CARPAL TUNNEL SYNDROME: Primary | ICD-10-CM

## 2018-01-25 NOTE — TELEPHONE ENCOUNTER
----- Message from Sridevi Mast sent at 1/25/2018 12:14 PM EST -----  Contact: KO / PT CALL BACK   PATIENT CALLED TO LET DR CONNELL THAT AFTER TAKING A STEROID AND  WEARING WRIST SUPPORT FOR CARPEL TUNNEL HE CANNOT TELL THAT HIS SYMPTOMS ARE NO BETTER.  WHAT WOULD BE THE NEXT STEP?    PT CALL BACK [phone]  WORK NUMBER  [phone]

## 2018-01-26 PROBLEM — G56.03 BILATERAL CARPAL TUNNEL SYNDROME: Status: ACTIVE | Noted: 2018-01-26

## 2018-01-26 RX ORDER — FUROSEMIDE 40 MG/1
TABLET ORAL
Qty: 90 TABLET | Refills: 3 | Status: SHIPPED | OUTPATIENT
Start: 2018-01-26 | End: 2018-02-23 | Stop reason: SDUPTHER

## 2018-02-08 ENCOUNTER — OFFICE VISIT (OUTPATIENT)
Dept: ORTHOPEDIC SURGERY | Facility: CLINIC | Age: 58
End: 2018-02-08

## 2018-02-08 VITALS
WEIGHT: 217.15 LBS | BODY MASS INDEX: 31.09 KG/M2 | SYSTOLIC BLOOD PRESSURE: 137 MMHG | DIASTOLIC BLOOD PRESSURE: 76 MMHG | HEIGHT: 70 IN | HEART RATE: 74 BPM

## 2018-02-08 DIAGNOSIS — G56.03 BILATERAL CARPAL TUNNEL SYNDROME: Primary | ICD-10-CM

## 2018-02-08 DIAGNOSIS — M79.641 PAIN IN BOTH HANDS: ICD-10-CM

## 2018-02-08 DIAGNOSIS — M79.642 PAIN IN BOTH HANDS: ICD-10-CM

## 2018-02-08 DIAGNOSIS — M54.2 CERVICALGIA: ICD-10-CM

## 2018-02-08 DIAGNOSIS — G89.29 CHRONIC RIGHT SHOULDER PAIN: ICD-10-CM

## 2018-02-08 DIAGNOSIS — M25.511 CHRONIC RIGHT SHOULDER PAIN: ICD-10-CM

## 2018-02-08 PROCEDURE — 99203 OFFICE O/P NEW LOW 30 MIN: CPT | Performed by: ORTHOPAEDIC SURGERY

## 2018-02-08 NOTE — PROGRESS NOTES
[facility] Note    Subjective     Pain of the Left Hand (Started 1 month ago, increasing in pattern, 1/10 pain scale today, modifying factors include bracing, activity modification, oral steroids (5 days prednisone)) and Pain of the Right Hand (Started 1 month ago, increasing in pattern, 1/10 pain scale today, modifying factors include bracing, activity modification, oral steroids (5 days prednisone))      LIOR Simmons is a 57 y.o. male. Patient is a [employer] male who is had 1 month history of worsening hand numbness and tingling.  In both central 3 digits.  His right side is more affected than the left.  2 months ago, he underwent left total knee arthroplasty by dr. guerrero.  patient is also complaining of right shoulder pain.  he has worn braces and taken oral steroids without a lot of improvement.  he is a  and is having difficulty doing his job.     Past Medical History:   Diagnosis Date   • Anxiety    • Chest pain    • CHF (congestive heart failure)     Chronic systolic   • Dyspnea on effort    • Fatigue    • Hyperlipidemia    • Hypertension    • Irritability    • Left bundle branch block    • Lower back pain    • Lower leg edema    • Nonischemic cardiomyopathy    • Shortness of breath    • URTI (acute upper respiratory infection)       Past Surgical History:   Procedure Laterality Date   • ADENOIDECTOMY     • CARDIAC DEFIBRILLATOR PLACEMENT      BSC biventricular ICD August 2015   • ELBOW PROCEDURE Right 1973   • PACEMAKER IMPLANTATION  2015   • TONSILLECTOMY     • TOTAL KNEE ARTHROPLASTY Left 10/2017      Family History   Problem Relation Age of Onset   • COPD Mother    • Emphysema Mother    • Diabetes Mother    • COPD Father    • Other Father      black lung      Social History     Social History   • Marital status:      Spouse name: N/A   • Number of children: N/A   • Years of education: N/A     Occupational History   •  [employer]   •   [employer]     [place] [employer]     Social History Main Topics   • Smoking status: Former Smoker     Packs/day: 2.00     Years: 30.00     Quit date: 7/27/1999   • Smokeless tobacco: Never Used      Comment: states quit in 2000 12/09/2015   • Alcohol use Yes      Comment: 2-4 beers per day   • Drug use: No   • Sexual activity: Defer      Comment:      Other Topics Concern   • Not on file     Social History Narrative      Current Outpatient Prescriptions on File Prior to Visit   Medication Sig Dispense Refill   • albuterol (PROVENTIL HFA;VENTOLIN HFA) 108 (90 Base) MCG/ACT inhaler Inhale 2 puffs Every 4 (Four) Hours As Needed for Wheezing or Shortness of Air. 1 inhaler 0   • aspirin 81 MG tablet Take  by mouth daily.     • atorvastatin (LIPITOR) 10 MG tablet TAKE ONE TABLET BY MOUTH AT BEDTIME 90 tablet 0   • carvedilol (COREG) 12.5 MG tablet TAKE ONE TABLET BY MOUTH TWICE DAILY WITH MEALS 180 tablet 0   • Cholecalciferol (VITAMIN D3) 2000 UNITS capsule Take 1 capsule by mouth daily.     • FLUoxetine (PROzac) 20 MG tablet Take 1 tablet by mouth Daily. 90 tablet 1   • Fluticasone Furoate-Vilanterol (BREO ELLIPTA) 200-25 MCG/INH aerosol powder  Inhale 1 puff Daily. 14 each 0   • furosemide (LASIX) 40 MG tablet TAKE ONE TABLET BY MOUTH ONCE DAILY 90 tablet 3   • Probiotic Product (PROBIOTIC DAILY PO) Take  by mouth Daily.     • sacubitril-valsartan (ENTRESTO) 24-26 MG tablet Take 1 tablet by mouth 2 (Two) Times a Day. 180 tablet 3   • spironolactone (ALDACTONE) 25 MG tablet TAKE ONE TABLET BY MOUTH ONCE DAILY 90 tablet 0   • zanamivir (RELENZA) 5 MG/BLISTER inhaler Inhale 2 puffs 2 (Two) Times a Day. 20 inhaler 0     No current facility-administered medications on file prior to visit.       No Known Allergies     The following portions of the patient's history were reviewed and updated as appropriate: allergies, current medications, past family history, past medical history, past social history, past  "surgical history and problem list.    Review of Systems   Eyes: Negative.    Respiratory: Negative.    Cardiovascular: Negative.    Gastrointestinal: Negative.    Endocrine: Negative.    Genitourinary: Negative.    Musculoskeletal: Positive for arthralgias and neck pain.   Skin: Negative.    Allergic/Immunologic: Negative.    Neurological: Positive for dizziness, weakness, light-headedness, numbness and headaches.   Hematological: Negative.    Psychiatric/Behavioral: Negative.         Objective      Physical Exam  /76  Pulse 74  Ht 177 cm (69.69\")  Wt 98.5 kg (217 lb 2.5 oz)  BMI 31.44 kg/m2    Body mass index is 31.44 kg/(m^2).    General  Mental Status - alert  General Appearance - cooperative, well groomed, not in acute distress  Orientation - Oriented X3  Build & Nutrition - well developed and well nourished  Posture - normal posture  Gait - normal gait     Integumentary  Global Assessment  Examination of related systems reveals - no lymphadenopathy  General Characteristics  Overall examination of the patient's skin reveals - no rashes, no evidence of scars, no suspicious lesions and no bruises.  Color - normal coloration of skin.    Ortho Exam  Peripheral Vascular   Bilateral Upper Extremity    No cyanotic nail beds    Pink nail beds and rapid capillary refill   Palpation    Radial Pulse - Bilaterally normal    Neurologic   Sensory: Light touch intact- Right and left hand    Left Upper Extremity    Left wrist extensors: 5/5    Left wrist flexors: 5/5    Left intrinsics: 5/5      Right Upper Extremity    Right wrist extensors: 5/5    Right wrist flexors: 5/5    Right intrinsics: 5/5    Musculoskeletal   Left Elbow    Forearm supination: AROM - 90 degrees    Forearm pronation: AROM - 90 degrees   Right Elbow    Forearm supination: AROM - 90 degrees    Forearm pronation: AROM - 90 degrees     Inspection and Palpation   Right Wrist      Tenderness - none    Swelling - none    Crepitus - none    Muscle " tone - no atrophy   Left Wrist    Tenderness - none    Swelling - none    Crepitus - none    Muscle tone - no atrophy     ROJM:   Left Wrist    Flexion: AROM - 90 degrees    Extension: AROM - 90 degrees   Right Wrist    Flexion: AROM - 90 degrees    Extension: AROM - 90 degrees     Deformities, Malalignments, Discrepancies    None     Functional Testing   Right Wrist    Tinel's Sign-- negative    Phalen's Sign--positive    Carpal Compression Test--negative    Thenar wasting--mild    APB--4+/5   Left Wrist    Tinel's Sign--negative    Phalen's Sign--positive    Carpal Compression Test-- negative    Thenar Wasting--minimal    APB--4+/5       Strength and Tone    Right  strength: good    Left  strength: good        Imaging/Studies  X-rays of the patient's hands were reviewed and they show age-appropriate degenerative changes    Assessment:  1. Bilateral carpal tunnel syndrome    2. Pain in both hands    3. Chronic right shoulder pain    4. Cervicalgia        Plan:  1. Continue over-the-counter medications as needed  2. We will add a vitamin E and vitamin B complex to his regimen  3. We will get nerve studies to further evaluate the level of compression and also to evaluate for possible cervical radiculopathy.  4. We can certainly look into his right shoulder pain down the road if it continues and if the etiology is not made clear by nerve studies.      Medical Decision Making  Management Options : over-the-counter medicine  Data/Risk: radiology tests and independent visualization of imaging, lab tests, or EMG/NCV    Aron Winn MD  02/08/18  1:50 PM

## 2018-02-21 ENCOUNTER — OFFICE VISIT (OUTPATIENT)
Dept: CARDIOLOGY | Facility: CLINIC | Age: 58
End: 2018-02-21

## 2018-02-21 VITALS
WEIGHT: 223 LBS | SYSTOLIC BLOOD PRESSURE: 113 MMHG | DIASTOLIC BLOOD PRESSURE: 72 MMHG | HEIGHT: 70 IN | BODY MASS INDEX: 31.92 KG/M2 | HEART RATE: 74 BPM

## 2018-02-21 DIAGNOSIS — E78.00 PURE HYPERCHOLESTEROLEMIA: ICD-10-CM

## 2018-02-21 DIAGNOSIS — I10 ESSENTIAL HYPERTENSION: ICD-10-CM

## 2018-02-21 DIAGNOSIS — I42.0 DILATED CARDIOMYOPATHY (HCC): Primary | ICD-10-CM

## 2018-02-21 PROCEDURE — 93284 PRGRMG EVAL IMPLANTABLE DFB: CPT | Performed by: INTERNAL MEDICINE

## 2018-02-21 PROCEDURE — 99213 OFFICE O/P EST LOW 20 MIN: CPT | Performed by: INTERNAL MEDICINE

## 2018-02-21 NOTE — PROGRESS NOTES
Subjective:     Encounter Date:02/21/2018      Patient ID: Arash Simmons is a 57 y.o. male.    Chief Complaint: Chronic systolic congestive heart failure and Hypertension      PROBLEM LIST:  1. Nonischemic cardiomyopathy:  a.  On 04/07/2015, abnormal myocardial perfusion study with evidence of dilated cardiomyopathy, ejection fraction of 16%, large fixed perfusion defect in the anterior apex, consistent with prior myocardial infarction.   b. On 05/08/2015, cardiac catheterization  with EF of 10% to 15%.  Normal coronary arteries.  Severe left ventricular dilatation.    c. Echo, 07/08/2015, LVEF 20%.   d. Status post biventricular ICD placement, August 2015.  2.  Left bundle branch block.   3. Hypertension.   4. Dyslipidemia.   5. Anxiety.   6. Questionable sleep apnea.   7. Left knee replacement 2017  8. Abdominal/intestinal surgery as a child.     History of Present Illness  Patient returns today for follow up with a history of patient returns today for annual follow-up of nonischemic cardio myopathy.  Since her last visit, he has no cardiovascular complaints.  Specifically denying any orthopnea PND dyspnea on exertion chest pain and tachycardia palpitations presyncope syncope or edema.  His main complaint is of ongoing arthritic pain in his right knee after left knee replacement.  He also complains of severe bilateral hand numbness, right hand weakness, and right leg weakness.  He is seeing a neurologist for this is a scheduled to undergo a nerve conduction study in the upcoming weeks..     No Known Allergies      Current Outpatient Prescriptions:   •  aspirin 81 MG tablet, Take  by mouth daily., Disp: , Rfl:   •  atorvastatin (LIPITOR) 10 MG tablet, TAKE ONE TABLET BY MOUTH AT BEDTIME, Disp: 90 tablet, Rfl: 0  •  carvedilol (COREG) 12.5 MG tablet, TAKE ONE TABLET BY MOUTH TWICE DAILY WITH MEALS, Disp: 180 tablet, Rfl: 0  •  Cholecalciferol (VITAMIN D3) 2000 UNITS capsule, Take 1 capsule by mouth daily., Disp:  ", Rfl:   •  FLUoxetine (PROzac) 20 MG tablet, Take 1 tablet by mouth Daily., Disp: 90 tablet, Rfl: 1  •  furosemide (LASIX) 40 MG tablet, TAKE ONE TABLET BY MOUTH ONCE DAILY, Disp: 90 tablet, Rfl: 3  •  Probiotic Product (PROBIOTIC DAILY PO), Take  by mouth Daily., Disp: , Rfl:   •  sacubitril-valsartan (ENTRESTO) 24-26 MG tablet, Take 1 tablet by mouth 2 (Two) Times a Day., Disp: 180 tablet, Rfl: 3  •  spironolactone (ALDACTONE) 25 MG tablet, TAKE ONE TABLET BY MOUTH ONCE DAILY, Disp: 90 tablet, Rfl: 0    The following portions of the patient's history were reviewed and updated as appropriate: allergies, current medications, past family history, past medical history, past social history, past surgical history and problem list.    Review of Systems   Constitution: Positive for malaise/fatigue and weight gain. Negative for weakness.   Cardiovascular: Negative.    Respiratory: Positive for shortness of breath.    Hematologic/Lymphatic: Negative for bleeding problem. Does not bruise/bleed easily.   Skin: Negative for rash.   Musculoskeletal: Positive for joint pain, muscle weakness and neck pain. Negative for myalgias.   Gastrointestinal: Positive for bloating. Negative for heartburn, nausea and vomiting.   Neurological: Positive for numbness.          Objective:   Blood pressure 113/72, pulse 74, height 177.8 cm (70\"), weight 101 kg (223 lb).      Physical Exam   Constitutional: He is oriented to person, place, and time. He appears well-developed and well-nourished.   HENT:   Mouth/Throat: Oropharynx is clear and moist.   Neck: No JVD present. Carotid bruit is not present. No thyromegaly present.   Cardiovascular: Regular rhythm, S1 normal, S2 normal, normal heart sounds and intact distal pulses.  Exam reveals no gallop, no S3 and no S4.    No murmur heard.  Pulses:       Carotid pulses are 2+ on the right side, and 2+ on the left side.       Radial pulses are 2+ on the right side, and 2+ on the left side. "   Pulmonary/Chest: Breath sounds normal.   Abdominal: Soft. Bowel sounds are normal. He exhibits no mass. There is no tenderness.   Musculoskeletal: He exhibits no edema.   Neurological: He is alert and oriented to person, place, and time.   Skin: Skin is warm and dry. No rash noted.       Lab Review:    Procedures  ICD check: Patient 99% biventricular paced rhythm percent right atrially paced.  Normal thresholds and impedances.  Estimated 6.5 years of battery life.  No arrhythmias or therapies noted.      Assessment:   There are no diagnoses linked to this encounter.    Impression  1. Nonischemic cardio myopathy.  Last LVEF 20%.  Status post biventricular ICD.  Currently euvolemic and functional class I-II  2. Hypertension controlled  3. Dyslipidemia controlled  4. Constellation of neurologic symptoms, coupled with neck pain consistent with cervical spine issues.  We'll defer workup to neurology.    Plan:  1. No change in current medical therapy  2. Normal functioning biventricular ICD, no changes  3. If patient needs cervical MRI he is to contact her office to help arrange safely.  4. Revisit in 6 MO, or sooner as needed.    Pb Naqvi MD

## 2018-02-23 DIAGNOSIS — E78.00 PURE HYPERCHOLESTEROLEMIA: ICD-10-CM

## 2018-02-23 DIAGNOSIS — I50.22 CHRONIC SYSTOLIC CONGESTIVE HEART FAILURE (HCC): ICD-10-CM

## 2018-02-23 DIAGNOSIS — I10 ESSENTIAL HYPERTENSION: ICD-10-CM

## 2018-02-23 RX ORDER — ATORVASTATIN CALCIUM 10 MG/1
10 TABLET, FILM COATED ORAL DAILY
Qty: 90 TABLET | Refills: 4 | Status: SHIPPED | OUTPATIENT
Start: 2018-02-23 | End: 2019-03-08 | Stop reason: SDUPTHER

## 2018-02-23 RX ORDER — FUROSEMIDE 40 MG/1
40 TABLET ORAL DAILY
Qty: 90 TABLET | Refills: 4 | Status: SHIPPED | OUTPATIENT
Start: 2018-02-23 | End: 2019-10-31 | Stop reason: SDUPTHER

## 2018-02-23 RX ORDER — CARVEDILOL 12.5 MG/1
12.5 TABLET ORAL 2 TIMES DAILY WITH MEALS
Qty: 180 TABLET | Refills: 4 | Status: SHIPPED | OUTPATIENT
Start: 2018-02-23 | End: 2019-05-17 | Stop reason: SDUPTHER

## 2018-02-23 RX ORDER — SPIRONOLACTONE 25 MG/1
25 TABLET ORAL DAILY
Qty: 90 TABLET | Refills: 4 | Status: SHIPPED | OUTPATIENT
Start: 2018-02-23 | End: 2019-03-12 | Stop reason: SDUPTHER

## 2018-03-08 ENCOUNTER — OFFICE VISIT (OUTPATIENT)
Dept: ORTHOPEDIC SURGERY | Facility: CLINIC | Age: 58
End: 2018-03-08

## 2018-03-08 VITALS
HEART RATE: 72 BPM | BODY MASS INDEX: 31.88 KG/M2 | HEIGHT: 70 IN | DIASTOLIC BLOOD PRESSURE: 72 MMHG | SYSTOLIC BLOOD PRESSURE: 158 MMHG | WEIGHT: 222.66 LBS

## 2018-03-08 DIAGNOSIS — G89.29 CHRONIC RIGHT SHOULDER PAIN: ICD-10-CM

## 2018-03-08 DIAGNOSIS — M79.641 PAIN IN BOTH HANDS: Primary | ICD-10-CM

## 2018-03-08 DIAGNOSIS — G56.03 BILATERAL CARPAL TUNNEL SYNDROME: ICD-10-CM

## 2018-03-08 DIAGNOSIS — M54.2 CERVICALGIA: ICD-10-CM

## 2018-03-08 DIAGNOSIS — M79.642 PAIN IN BOTH HANDS: Primary | ICD-10-CM

## 2018-03-08 DIAGNOSIS — M25.511 CHRONIC RIGHT SHOULDER PAIN: ICD-10-CM

## 2018-03-08 PROCEDURE — 99214 OFFICE O/P EST MOD 30 MIN: CPT | Performed by: ORTHOPAEDIC SURGERY

## 2018-03-08 PROCEDURE — 20526 THER INJECTION CARP TUNNEL: CPT | Performed by: ORTHOPAEDIC SURGERY

## 2018-03-08 RX ORDER — LIDOCAINE HYDROCHLORIDE 10 MG/ML
1 INJECTION, SOLUTION INFILTRATION; PERINEURAL
Status: COMPLETED | OUTPATIENT
Start: 2018-03-08 | End: 2018-03-08

## 2018-03-08 RX ORDER — TRIAMCINOLONE ACETONIDE 40 MG/ML
20 INJECTION, SUSPENSION INTRA-ARTICULAR; INTRAMUSCULAR
Status: COMPLETED | OUTPATIENT
Start: 2018-03-08 | End: 2018-03-08

## 2018-03-08 RX ADMIN — TRIAMCINOLONE ACETONIDE 20 MG: 40 INJECTION, SUSPENSION INTRA-ARTICULAR; INTRAMUSCULAR at 15:19

## 2018-03-08 RX ADMIN — LIDOCAINE HYDROCHLORIDE 1 ML: 10 INJECTION, SOLUTION INFILTRATION; PERINEURAL at 15:19

## 2018-03-08 RX ADMIN — LIDOCAINE HYDROCHLORIDE 1 ML: 10 INJECTION, SOLUTION INFILTRATION; PERINEURAL at 15:18

## 2018-03-08 RX ADMIN — TRIAMCINOLONE ACETONIDE 20 MG: 40 INJECTION, SUSPENSION INTRA-ARTICULAR; INTRAMUSCULAR at 15:18

## 2018-03-08 NOTE — PROGRESS NOTES
Procedure   Carpal tunnel injection  Date/Time: 3/8/2018 3:18 PM  Consent given by: patient  Site marked: site marked  Timeout: Immediately prior to procedure a time out was called to verify the correct patient, procedure, equipment, support staff and site/side marked as required   Supporting Documentation  Indications: pain   Procedure Details  Location: wrist - Wrist joint: left.  Preparation: Patient was prepped and draped in the usual sterile fashion  Needle size: 25 G (short)  Approach: volar  Medications administered: 1 mL lidocaine 1 %; 20 mg triamcinolone acetonide 40 MG/ML  Patient tolerance: patient tolerated the procedure well with no immediate complications    Carpal tunnel injection  Date/Time: 3/8/2018 3:19 PM  Consent given by: patient  Site marked: site marked  Timeout: Immediately prior to procedure a time out was called to verify the correct patient, procedure, equipment, support staff and site/side marked as required   Supporting Documentation  Indications: pain   Procedure Details  Location: wrist - Wrist joint: right.  Preparation: Patient was prepped and draped in the usual sterile fashion  Needle size: 25 G (short)  Approach: volar  Medications administered: 1 mL lidocaine 1 %; 20 mg triamcinolone acetonide 40 MG/ML  Patient tolerance: patient tolerated the procedure well with no immediate complications

## 2018-03-08 NOTE — PROGRESS NOTES
"    [facility] [place] [facility] Note    Subjective     CC: Follow-up (1 month follow up: Bilateral carpal tunnel syndrome, EMG Results)      LIOR Simmons is a 57 y.o. male. Patient returns for follow-up after the EMG nerve conduction velocity studies on both of his hands.  This study is done at [facility].  The official interpretation is reviewed today.  Patient tells me his hands are still bothering him.  They stay numb and tingly.  The right side and has more vague pain and he tells me his got some numbness in the radial 3 digits and pain at the base of the thumb.  Patient is also complaining of shoulder and neck pain.  He is wondering if his work as involved.       ROS:    Constiutional:Pt denies fever, chills, nausea, or vomiting.  MSK:as above    Objective      Past Medical History  Past Medical History:   Diagnosis Date   • Anxiety    • Chest pain    • CHF (congestive heart failure)     Chronic systolic   • Dyspnea on effort    • Fatigue    • Hyperlipidemia    • Hypertension    • Irritability    • Left bundle branch block    • Lower back pain    • Lower leg edema    • Nonischemic cardiomyopathy    • Shortness of breath    • URTI (acute upper respiratory infection)          Physical Exam  /72  Pulse 72  Ht 177.8 cm (70\")  Wt 101 kg (222 lb 10.6 oz)  BMI 31.95 kg/m2    Body mass index is 31.95 kg/(m^2).    Patient is well nourished and well developed.        Ortho Exam  Peripheral Vascular   Bilateral Upper Extremity    No cyanotic nail beds    Pink nail beds and rapid capillary refill   Palpation    Radial Pulse - Bilaterally normal    Neurologic   Sensory: Light touch intact- Right and left hand    Left Upper Extremity    Left wrist extensors: 5/5    Left wrist flexors: 5/5    Left intrinsics: 5/5      Right Upper Extremity    Right wrist extensors: 5/5    Right wrist flexors: 5/5    Right intrinsics: 5/5    Musculoskeletal   Left Elbow    Forearm " supination: AROM - 90 degrees    Forearm pronation: AROM - 90 degrees   Right Elbow    Forearm supination: AROM - 90 degrees    Forearm pronation: AROM - 90 degrees     Inspection and Palpation   Right Wrist      Tenderness - none    Swelling - none    Crepitus - none    Muscle tone - no atrophy   Left Wrist    Tenderness - none    Swelling - none    Crepitus - none    Muscle tone - no atrophy     ROJM:   Left Wrist    Flexion: AROM - 90 degrees    Extension: AROM - 90 degrees   Right Wrist    Flexion: AROM - 90 degrees    Extension: AROM - 90 degrees     Deformities, Malalignments, Discrepancies    None     Functional Testing   Right Wrist    Tinel's Sign-- negative    Phalen's Sign--positive    Carpal Compression Test--negative    Thenar wasting--minimal    APB--4+/5   Left Wrist    Tinel's Sign--negative    Phalen's Sign--positive    Carpal Compression Test-- negative    Thenar Wasting--minimal    APB--4+/5       Strength and Tone    Right  strength: good    Left  strength: good     Hand Exam:  Patient is mildly tender at the A1 pulley of the right thumb but there is no reproducible locking.    Imaging/Labs/EMG Reviewed:  Review the nerve conduction velocity studies show a left mild carpal tunnel syndrome.  Median nerve motor latency is 3.8 ms on the left and 3.4 ms on the right.  This is primarily based on the sensory findings.    Assessment:  1. Pain in both hands    2. Bilateral carpal tunnel syndrome    3. Chronic right shoulder pain    4. Cervicalgia        Plan:  1. Recommend over the counter anti-inflammatories for pain and/or swelling  2. We've a long discussion with the patient about treatment options and alternatives.  At this point, given the results of the study, his complaints are consistent with carpal tunnel syndrome but the objective evidence is not there.  This may represent a peripheral neuropathy also.  Diagnostic injections into the carpal tunnels will be given today.  3. Patient is  asked me whether or not I think his shoulder and neck issues are work-related and I have told him that they are certainly at the very least exacerbated by his work.  He says he will tell his boss and we'll likely get sent to the company doctor.      Medical Decision Making  Management Options : over-the-counter medicine and prescription/IM medicine  Data/Risk: independent visualization of imaging, lab tests, or EMG/NCV    Aron Winn MD  03/08/18  5:26 PM

## 2018-03-14 ENCOUNTER — OFFICE VISIT (OUTPATIENT)
Dept: FAMILY MEDICINE CLINIC | Facility: CLINIC | Age: 58
End: 2018-03-14

## 2018-03-14 VITALS
SYSTOLIC BLOOD PRESSURE: 122 MMHG | RESPIRATION RATE: 18 BRPM | TEMPERATURE: 98.6 F | WEIGHT: 221 LBS | DIASTOLIC BLOOD PRESSURE: 88 MMHG | HEART RATE: 76 BPM | HEIGHT: 70 IN | BODY MASS INDEX: 31.64 KG/M2

## 2018-03-14 DIAGNOSIS — G56.02 CARPAL TUNNEL SYNDROME OF LEFT WRIST: ICD-10-CM

## 2018-03-14 DIAGNOSIS — G62.9 NEUROPATHY: ICD-10-CM

## 2018-03-14 DIAGNOSIS — M54.2 NECK PAIN: ICD-10-CM

## 2018-03-14 DIAGNOSIS — M25.511 CHRONIC RIGHT SHOULDER PAIN: Primary | ICD-10-CM

## 2018-03-14 DIAGNOSIS — G89.29 CHRONIC RIGHT SHOULDER PAIN: Primary | ICD-10-CM

## 2018-03-14 PROCEDURE — 99214 OFFICE O/P EST MOD 30 MIN: CPT | Performed by: FAMILY MEDICINE

## 2018-03-14 RX ORDER — GABAPENTIN 300 MG/1
CAPSULE ORAL
Qty: 90 CAPSULE | Refills: 3 | Status: SHIPPED | OUTPATIENT
Start: 2018-03-14 | End: 2018-10-04 | Stop reason: SDUPTHER

## 2018-03-14 NOTE — PROGRESS NOTES
Subjective   Arash Simmons is a 57 y.o. male.     History of Present Illness     Hands are not doing better  No change with splints nor prednisone    Ortho told him that this was CTsyndrome.  Pt also noted that he has pain in shoulders and arms and legs.  Nerve conduction study done with neurology    Pt tells me that his neck and right arm are still worse    He did get 2 injections in his Carpal tunnel area but this did not help    He has decreased range of motion in the right shoulder  Cannot raise it above 90 degrees    His neck hurts with looking up  Looking down makes his neck not have pain  At work he has to look around    The following portions of the patient's history were reviewed and updated as appropriate: allergies, current medications, past family history, past medical history, past social history, past surgical history and problem list.    Review of Systems   Constitutional: Negative.    HENT: Negative.    Eyes: Negative.    Respiratory: Negative.    Cardiovascular: Negative.    Gastrointestinal: Negative.    Musculoskeletal:        Right shoulder and neck pain as per HPI   Skin: Negative.    Neurological: Negative.    Psychiatric/Behavioral: Negative.    All other systems reviewed and are negative.      Objective   Physical Exam   Constitutional: He is oriented to person, place, and time. He appears well-developed and well-nourished. No distress.   Neck:       Cardiovascular: Normal rate, regular rhythm and normal heart sounds.    Pulmonary/Chest: Effort normal and breath sounds normal.   Musculoskeletal:        Arms:  Neurological: He is alert and oriented to person, place, and time.   Psychiatric: He has a normal mood and affect. His behavior is normal.   Nursing note and vitals reviewed.      Assessment/Plan   Arash was seen today for follow-up.    Diagnoses and all orders for this visit:    Chronic right shoulder pain  -     Ambulatory Referral to Orthopedic Surgery    Neck pain  -     CT  cervical spine wo contrast; Future    Carpal tunnel syndrome of left wrist    Neuropathy  -     gabapentin (NEURONTIN) 300 MG capsule; 1 po daily today then 1 PO BID tomorrow and then 1 PO TID the next day and all subsequent days    due to pacemaker canot get MRI, will set up with ortho for his right shoulder pain.  He simply cannot lift the arm over his head.  Neck pain is aggravated with movement and positions, will check CT of spine  Reviewed notes from [facility] and EMG, will try neurontin for ongoing neuropathic pain.  If this does not help we would stop this medicine but will give it a trial

## 2018-03-19 ENCOUNTER — APPOINTMENT (OUTPATIENT)
Dept: CT IMAGING | Facility: HOSPITAL | Age: 58
End: 2018-03-19

## 2018-03-22 DIAGNOSIS — F41.9 ANXIETY: ICD-10-CM

## 2018-03-22 RX ORDER — FLUOXETINE 20 MG/1
TABLET, FILM COATED ORAL
Qty: 30 TABLET | Refills: 5 | OUTPATIENT
Start: 2018-03-22

## 2018-03-26 DIAGNOSIS — F41.9 ANXIETY: ICD-10-CM

## 2018-03-26 RX ORDER — FLUOXETINE 20 MG/1
TABLET, FILM COATED ORAL
Qty: 30 TABLET | Refills: 5 | OUTPATIENT
Start: 2018-03-26

## 2018-03-31 DIAGNOSIS — F41.9 ANXIETY: ICD-10-CM

## 2018-04-02 RX ORDER — FLUOXETINE 20 MG/1
TABLET, FILM COATED ORAL
Qty: 30 TABLET | Refills: 0 | Status: SHIPPED | OUTPATIENT
Start: 2018-04-02 | End: 2018-05-01

## 2018-05-01 ENCOUNTER — OFFICE VISIT (OUTPATIENT)
Dept: FAMILY MEDICINE CLINIC | Facility: CLINIC | Age: 58
End: 2018-05-01

## 2018-05-01 VITALS
HEART RATE: 78 BPM | TEMPERATURE: 97.1 F | HEIGHT: 70 IN | SYSTOLIC BLOOD PRESSURE: 112 MMHG | BODY MASS INDEX: 31.92 KG/M2 | DIASTOLIC BLOOD PRESSURE: 68 MMHG | RESPIRATION RATE: 18 BRPM | WEIGHT: 223 LBS

## 2018-05-01 DIAGNOSIS — F41.9 ANXIETY: ICD-10-CM

## 2018-05-01 DIAGNOSIS — M50.30 DEGENERATIVE DISC DISEASE, CERVICAL: Primary | ICD-10-CM

## 2018-05-01 PROCEDURE — 99213 OFFICE O/P EST LOW 20 MIN: CPT | Performed by: FAMILY MEDICINE

## 2018-05-01 RX ORDER — FLUOXETINE 20 MG/1
20 TABLET, FILM COATED ORAL DAILY
Qty: 90 TABLET | Refills: 1 | Status: SHIPPED | OUTPATIENT
Start: 2018-05-01 | End: 2018-12-30 | Stop reason: SDUPTHER

## 2018-05-01 NOTE — PROGRESS NOTES
Subjective   Arash Simmons is a 57 y.o. male.     History of Present Illness     He saw azucena ochoa with Dr. Galindo and they ordered a CT myelogram but he could not get that done due to the cost    Pt has been on neurontin from me since I have him  He feels like his pain in his neck is slightly better but not completely gone. Worse with raising his head still  Still has some numbness in his right arm and worse with certain positions.      The following portions of the patient's history were reviewed and updated as appropriate: allergies, current medications, past family history, past medical history, past social history, past surgical history and problem list.    Review of Systems   Constitutional: Negative.        Objective   Physical Exam   Constitutional: He appears well-developed and well-nourished. No distress.   Cardiovascular: Normal rate, regular rhythm and normal heart sounds.    Pulmonary/Chest: Effort normal and breath sounds normal.   Psychiatric: He has a normal mood and affect. His behavior is normal.   Nursing note and vitals reviewed.      Assessment/Plan   Arash was seen today for follow-up.    Diagnoses and all orders for this visit:    Degenerative disc disease, cervical    Anxiety  -     FLUoxetine (PROzac) 20 MG tablet; Take 1 tablet by mouth Daily.      Will continue neurontin for his neck pain and he will see how he feels over the next 2-3.  If not better at that time then he would call to get CT myelogram scheduled with ortho spine.  Pt agrees and will contact them with issues  Ok refill prozac

## 2018-06-25 ENCOUNTER — TELEPHONE (OUTPATIENT)
Dept: CARDIOLOGY | Facility: CLINIC | Age: 58
End: 2018-06-25

## 2018-06-25 NOTE — TELEPHONE ENCOUNTER
Pt called with having issues with his Lat[employer] home monitor.  His is hooked up through internet.  He gets good cell service.  Ordered a cellular adapter.

## 2018-07-05 ENCOUNTER — TELEPHONE (OUTPATIENT)
Dept: CARDIOLOGY | Facility: CLINIC | Age: 58
End: 2018-07-05

## 2018-07-11 ENCOUNTER — TELEPHONE (OUTPATIENT)
Dept: CARDIOLOGY | Facility: CLINIC | Age: 58
End: 2018-07-11

## 2018-07-11 NOTE — TELEPHONE ENCOUNTER
Mr Simmons has received his cell adaptor and has attached it to the monitor.However,he did not disconnect the inter net cable and the monitor was not connecting. He will disconnect the cable tonight and send a manual transmission.

## 2018-07-27 ENCOUNTER — TELEPHONE (OUTPATIENT)
Dept: FAMILY MEDICINE CLINIC | Facility: CLINIC | Age: 58
End: 2018-07-27

## 2018-07-27 DIAGNOSIS — M50.30 DEGENERATIVE DISC DISEASE, CERVICAL: Primary | ICD-10-CM

## 2018-08-29 ENCOUNTER — OFFICE VISIT (OUTPATIENT)
Dept: CARDIOLOGY | Facility: CLINIC | Age: 58
End: 2018-08-29

## 2018-08-29 VITALS
SYSTOLIC BLOOD PRESSURE: 116 MMHG | DIASTOLIC BLOOD PRESSURE: 82 MMHG | WEIGHT: 219.7 LBS | HEART RATE: 81 BPM | BODY MASS INDEX: 31.45 KG/M2 | HEIGHT: 70 IN

## 2018-08-29 DIAGNOSIS — Z01.810 PREOP CARDIOVASCULAR EXAM: ICD-10-CM

## 2018-08-29 DIAGNOSIS — E78.00 PURE HYPERCHOLESTEROLEMIA: ICD-10-CM

## 2018-08-29 DIAGNOSIS — I42.0 DILATED CARDIOMYOPATHY (HCC): Primary | ICD-10-CM

## 2018-08-29 DIAGNOSIS — I10 ESSENTIAL HYPERTENSION: ICD-10-CM

## 2018-08-29 PROCEDURE — 93284 PRGRMG EVAL IMPLANTABLE DFB: CPT | Performed by: INTERNAL MEDICINE

## 2018-08-29 PROCEDURE — 99214 OFFICE O/P EST MOD 30 MIN: CPT | Performed by: INTERNAL MEDICINE

## 2018-08-29 NOTE — PROGRESS NOTES
Subjective:     Encounter Date:08/29/2018      Patient ID: Arash Simmons is a 58 y.o. male.    Chief Complaint: Chronic systolic congestive heart failure and Hypertension      PROBLEM LIST:  1. Nonischemic cardiomyopathy:  a.  On 04/07/2015, abnormal myocardial perfusion study with evidence of dilated cardiomyopathy, ejection fraction of 16%, large fixed perfusion defect in the anterior apex, consistent with prior myocardial infarction.   b. On 05/08/2015, cardiac catheterization  with EF of 10% to 15%.  Normal coronary arteries.  Severe left ventricular dilatation.    c. Echo, 07/08/2015, LVEF 20%.   d. Status post biventricular ICD placement, August 2015.  2.  Left bundle branch block.   3. Hypertension.   4. Dyslipidemia.   5. Anxiety.   6. Cervical spine  7. Questionable sleep apnea.   8. Left knee replacement 2017  9. Abdominal/intestinal surgery as a child.     History of Present Illness  Patient returns today for follow up with a history of myopathy.  Since her last visit he is doing very well no exertional dyspnea cough orthopnea PND.  He only notes edema in his upper extremities when he is riding his lawnmower.  Never any lower extremity edema.  He has progressive imbalance and upper extremity tingling and numbness apparently due to cervical disc disease.  He is scheduled for upcoming discectomy.  Overall his functional class I-II at this time.     No Known Allergies      Current Outpatient Prescriptions:   •  aspirin 81 MG tablet, Take  by mouth daily., Disp: , Rfl:   •  atorvastatin (LIPITOR) 10 MG tablet, Take 1 tablet by mouth Daily., Disp: 90 tablet, Rfl: 4  •  carvedilol (COREG) 12.5 MG tablet, Take 1 tablet by mouth 2 (Two) Times a Day With Meals., Disp: 180 tablet, Rfl: 4  •  Cholecalciferol (VITAMIN D3) 2000 UNITS capsule, Take 1 capsule by mouth daily., Disp: , Rfl:   •  FLUoxetine (PROzac) 20 MG tablet, Take 1 tablet by mouth Daily., Disp: 90 tablet, Rfl: 1  •  furosemide (LASIX) 40 MG tablet,  "Take 1 tablet by mouth Daily., Disp: 90 tablet, Rfl: 4  •  gabapentin (NEURONTIN) 300 MG capsule, 1 po daily today then 1 PO BID tomorrow and then 1 PO TID the next day and all subsequent days (Patient taking differently: 2 (Two) Times a Day. 1 po daily today then 1 PO BID tomorrow and then 1 PO TID the next day and all subsequent days), Disp: 90 capsule, Rfl: 3  •  spironolactone (ALDACTONE) 25 MG tablet, Take 1 tablet by mouth Daily., Disp: 90 tablet, Rfl: 4  •  sacubitril-valsartan (ENTRESTO) 24-26 MG tablet, Take 1 tablet by mouth Every 12 (Twelve) Hours., Disp: 180 tablet, Rfl: 4    The following portions of the patient's history were reviewed and updated as appropriate: allergies, current medications, past family history, past medical history, past social history, past surgical history and problem list.    Review of Systems   Constitution: Positive for weight gain. Negative for weakness and malaise/fatigue.   Cardiovascular: Negative.    Respiratory: Positive for shortness of breath and snoring.    Hematologic/Lymphatic: Negative for bleeding problem. Does not bruise/bleed easily.   Skin: Negative for rash.   Musculoskeletal: Positive for arthritis, joint pain, muscle weakness and neck pain. Negative for myalgias.   Gastrointestinal: Positive for bloating. Negative for heartburn, nausea and vomiting.   Neurological: Positive for loss of balance and numbness.          Objective:   Blood pressure 116/82, pulse 81, height 177.8 cm (70\"), weight 99.7 kg (219 lb 11.2 oz).      Physical Exam   Constitutional: He is oriented to person, place, and time. He appears well-developed and well-nourished.   HENT:   Mouth/Throat: Oropharynx is clear and moist.   Neck: No JVD present. Carotid bruit is not present. No thyromegaly present.   Cardiovascular: Regular rhythm, S1 normal, S2 normal, normal heart sounds and intact distal pulses.  Exam reveals no gallop, no S3 and no S4.    No murmur heard.  Pulses:       Carotid " pulses are 2+ on the right side, and 2+ on the left side.       Radial pulses are 2+ on the right side, and 2+ on the left side.   Pulmonary/Chest: Breath sounds normal.   Abdominal: Soft. Bowel sounds are normal. He exhibits no mass. There is no tenderness.   Musculoskeletal: He exhibits no edema.   Neurological: He is alert and oriented to person, place, and time.   Skin: Skin is warm and dry. No rash noted.       Lab Review:    Procedures  Device check: A 40% atrially paced 98% biventricular paced.  Normal thresholds and impedances.  Charge time 9.9 seconds as report that her chest of 7 years life left.  No arrhythmias.      Assessment:   Arash was seen today for chronic systolic congestive heart failure and hypertension.    Diagnoses and all orders for this visit:    Dilated cardiomyopathy (CMS/HCC)    Pure hypercholesterolemia    Essential hypertension    Preop cardiovascular exam        Impression  1. Nonischemic cardio myopathy with normal functioning by the pacemaker/ICD  2. Hypertension controlled  3. Preoperative evaluation for cervical discectomy.    Plan:  1. Low cardiovascular risk of upcoming surgery.  He is baseline and functional class I-II  2. May hold aspirin necessary for surgery  3. Revisit in 6 MO, or sooner as needed.    Scribed for Pb Naqvi MD by Felipe Cyr. 8/29/2018  2:03 PM    I, Pb Naqvi MD, personally performed the services described in this documentation as scribed by the above individual in my presence, and it is both accurate and complete

## 2018-09-10 ENCOUNTER — TELEPHONE (OUTPATIENT)
Dept: CARDIOLOGY | Facility: CLINIC | Age: 58
End: 2018-09-10

## 2018-09-10 NOTE — TELEPHONE ENCOUNTER
Called pt due to Latitude home monitor not connecting.  Pt will try working with it tonight when he gets home.    9/11/18- Latitude home monitor still not reading.  Called [employer] and they suggested ordering a new monitor.  Pt is aware.

## 2018-09-13 ENCOUNTER — APPOINTMENT (OUTPATIENT)
Dept: PREADMISSION TESTING | Facility: HOSPITAL | Age: 58
End: 2018-09-13

## 2018-09-13 VITALS — WEIGHT: 215.61 LBS | HEIGHT: 70 IN | BODY MASS INDEX: 30.87 KG/M2

## 2018-09-13 LAB
DEPRECATED RDW RBC AUTO: 43.3 FL (ref 37–54)
ERYTHROCYTE [DISTWIDTH] IN BLOOD BY AUTOMATED COUNT: 12.9 % (ref 11.3–14.5)
HCT VFR BLD AUTO: 40.9 % (ref 38.9–50.9)
HGB BLD-MCNC: 13.3 G/DL (ref 13.1–17.5)
MCH RBC QN AUTO: 29.8 PG (ref 27–31)
MCHC RBC AUTO-ENTMCNC: 32.5 G/DL (ref 32–36)
MCV RBC AUTO: 91.7 FL (ref 80–99)
PLATELET # BLD AUTO: 251 10*3/MM3 (ref 150–450)
PMV BLD AUTO: 9.6 FL (ref 6–12)
RBC # BLD AUTO: 4.46 10*6/MM3 (ref 4.2–5.76)
WBC NRBC COR # BLD: 7.36 10*3/MM3 (ref 3.5–10.8)

## 2018-09-13 PROCEDURE — 36415 COLL VENOUS BLD VENIPUNCTURE: CPT

## 2018-09-13 PROCEDURE — 93010 ELECTROCARDIOGRAM REPORT: CPT | Performed by: INTERNAL MEDICINE

## 2018-09-13 PROCEDURE — 85027 COMPLETE CBC AUTOMATED: CPT | Performed by: ORTHOPAEDIC SURGERY

## 2018-09-13 PROCEDURE — 93005 ELECTROCARDIOGRAM TRACING: CPT

## 2018-09-13 PROCEDURE — 93296 REM INTERROG EVL PM/IDS: CPT | Performed by: INTERNAL MEDICINE

## 2018-09-13 PROCEDURE — 93295 DEV INTERROG REMOTE 1/2/MLT: CPT | Performed by: INTERNAL MEDICINE

## 2018-09-13 NOTE — DISCHARGE INSTRUCTIONS

## 2018-09-13 NOTE — PAT
Patient to apply Chlorhexadine wipes  to surgical area (as instructed) the night before procedure and the AM of procedure. Wipes provided.    Patient instructed to drink 20 ounces (or until full) of Gatorade or 20 ounces of G2 (if diabetic) and complete 3 hours before your surgery start time. (NO RED Gatorade or G2)  Patient verbalized understanding.      CARDIAC CLEARANCE ON CHART PER DR MEEKS AND PPM/ICD INTERROGATION REPORT ON CHART FROM 8-29-18

## 2018-09-14 ENCOUNTER — CLINICAL SUPPORT NO REQUIREMENTS (OUTPATIENT)
Dept: CARDIOLOGY | Facility: CLINIC | Age: 58
End: 2018-09-14

## 2018-09-14 DIAGNOSIS — I42.9 CARDIOMYOPATHY, UNSPECIFIED TYPE (HCC): ICD-10-CM

## 2018-09-18 ENCOUNTER — ANESTHESIA EVENT (OUTPATIENT)
Dept: PERIOP | Facility: HOSPITAL | Age: 58
End: 2018-09-18

## 2018-09-18 RX ORDER — FAMOTIDINE 10 MG/ML
20 INJECTION, SOLUTION INTRAVENOUS ONCE
Status: CANCELLED | OUTPATIENT
Start: 2018-09-18 | End: 2018-09-18

## 2018-09-19 ENCOUNTER — APPOINTMENT (OUTPATIENT)
Dept: GENERAL RADIOLOGY | Facility: HOSPITAL | Age: 58
End: 2018-09-19

## 2018-09-19 ENCOUNTER — ANESTHESIA (OUTPATIENT)
Dept: PERIOP | Facility: HOSPITAL | Age: 58
End: 2018-09-19

## 2018-09-19 ENCOUNTER — HOSPITAL ENCOUNTER (OUTPATIENT)
Facility: HOSPITAL | Age: 58
LOS: 1 days | Discharge: HOME | End: 2018-09-20
Attending: ORTHOPAEDIC SURGERY | Admitting: ORTHOPAEDIC SURGERY

## 2018-09-19 PROBLEM — Z98.1 S/P CERVICAL SPINAL FUSION: Status: ACTIVE | Noted: 2018-09-19

## 2018-09-19 PROBLEM — M54.2 NECK PAIN: Status: ACTIVE | Noted: 2018-09-19

## 2018-09-19 PROBLEM — Z95.0 PACEMAKER: Status: ACTIVE | Noted: 2018-09-19

## 2018-09-19 LAB — POTASSIUM BLDA-SCNC: 3.68 MMOL/L (ref 3.5–5.3)

## 2018-09-19 PROCEDURE — 25010000002 DEXAMETHASONE PER 1 MG: Performed by: NURSE ANESTHETIST, CERTIFIED REGISTERED

## 2018-09-19 PROCEDURE — C1713 ANCHOR/SCREW BN/BN,TIS/BN: HCPCS | Performed by: ORTHOPAEDIC SURGERY

## 2018-09-19 PROCEDURE — 25010000002 NEOSTIGMINE PER 0.5 MG: Performed by: NURSE ANESTHETIST, CERTIFIED REGISTERED

## 2018-09-19 PROCEDURE — 25010000002 FENTANYL CITRATE (PF) 100 MCG/2ML SOLUTION: Performed by: NURSE ANESTHETIST, CERTIFIED REGISTERED

## 2018-09-19 PROCEDURE — 76000 FLUOROSCOPY <1 HR PHYS/QHP: CPT

## 2018-09-19 PROCEDURE — 25010000002 PROPOFOL 10 MG/ML EMULSION: Performed by: NURSE ANESTHETIST, CERTIFIED REGISTERED

## 2018-09-19 PROCEDURE — 25010000003 CEFAZOLIN IN DEXTROSE 2-4 GM/100ML-% SOLUTION

## 2018-09-19 PROCEDURE — 63710000001 DEXAMETHASONE PER 0.25 MG: Performed by: ORTHOPAEDIC SURGERY

## 2018-09-19 PROCEDURE — 25010000003 CEFAZOLIN IN DEXTROSE 2-4 GM/100ML-% SOLUTION: Performed by: ORTHOPAEDIC SURGERY

## 2018-09-19 PROCEDURE — 76001 HC FLUORO GREATER THAN 1 HOUR: CPT

## 2018-09-19 PROCEDURE — 25810000003 SODIUM CHLORIDE 0.9 % WITH KCL 20 MEQ 20-0.9 MEQ/L-% SOLUTION: Performed by: ORTHOPAEDIC SURGERY

## 2018-09-19 PROCEDURE — 84132 ASSAY OF SERUM POTASSIUM: CPT | Performed by: ANESTHESIOLOGY

## 2018-09-19 PROCEDURE — L0120 CERV FLEX N/ADJ FOAM PRE OTS: HCPCS | Performed by: ORTHOPAEDIC SURGERY

## 2018-09-19 PROCEDURE — 25010000002 ONDANSETRON PER 1 MG: Performed by: NURSE ANESTHETIST, CERTIFIED REGISTERED

## 2018-09-19 DEVICE — PLT SKYLINE 1LEVEL 14MM: Type: IMPLANTABLE DEVICE | Site: SPINE CERVICAL | Status: FUNCTIONAL

## 2018-09-19 DEVICE — SCRW SKYLINE VAR SD 16MM: Type: IMPLANTABLE DEVICE | Site: SPINE CERVICAL | Status: FUNCTIONAL

## 2018-09-19 DEVICE — BENGAL SYSTEM LARGE IMPLANT 6MM, 7 DEGREES
Type: IMPLANTABLE DEVICE | Site: SPINE CERVICAL | Status: FUNCTIONAL
Brand: BENGAL

## 2018-09-19 RX ORDER — ATORVASTATIN CALCIUM 10 MG/1
10 TABLET, FILM COATED ORAL NIGHTLY
Status: DISCONTINUED | OUTPATIENT
Start: 2018-09-19 | End: 2018-09-20 | Stop reason: HOSPADM

## 2018-09-19 RX ORDER — SODIUM CHLORIDE 0.9 % (FLUSH) 0.9 %
1-10 SYRINGE (ML) INJECTION AS NEEDED
Status: DISCONTINUED | OUTPATIENT
Start: 2018-09-19 | End: 2018-09-20 | Stop reason: HOSPADM

## 2018-09-19 RX ORDER — ACETAMINOPHEN 325 MG/1
650 TABLET ORAL EVERY 4 HOURS PRN
Status: DISCONTINUED | OUTPATIENT
Start: 2018-09-19 | End: 2018-09-20 | Stop reason: HOSPADM

## 2018-09-19 RX ORDER — VECURONIUM BROMIDE 1 MG/ML
INJECTION, POWDER, LYOPHILIZED, FOR SOLUTION INTRAVENOUS AS NEEDED
Status: DISCONTINUED | OUTPATIENT
Start: 2018-09-19 | End: 2018-09-19 | Stop reason: SURG

## 2018-09-19 RX ORDER — PROPOFOL 10 MG/ML
VIAL (ML) INTRAVENOUS AS NEEDED
Status: DISCONTINUED | OUTPATIENT
Start: 2018-09-19 | End: 2018-09-19 | Stop reason: SURG

## 2018-09-19 RX ORDER — CEFAZOLIN SODIUM 2 G/100ML
2 INJECTION, SOLUTION INTRAVENOUS EVERY 8 HOURS
Status: DISCONTINUED | OUTPATIENT
Start: 2018-09-19 | End: 2018-09-19

## 2018-09-19 RX ORDER — FENTANYL CITRATE 50 UG/ML
INJECTION, SOLUTION INTRAMUSCULAR; INTRAVENOUS AS NEEDED
Status: DISCONTINUED | OUTPATIENT
Start: 2018-09-19 | End: 2018-09-19 | Stop reason: SURG

## 2018-09-19 RX ORDER — OXYCODONE HCL 10 MG/1
10 TABLET, FILM COATED, EXTENDED RELEASE ORAL ONCE
Status: COMPLETED | OUTPATIENT
Start: 2018-09-19 | End: 2018-09-19

## 2018-09-19 RX ORDER — HYDROMORPHONE HYDROCHLORIDE 1 MG/ML
0.5 INJECTION, SOLUTION INTRAMUSCULAR; INTRAVENOUS; SUBCUTANEOUS
Status: DISCONTINUED | OUTPATIENT
Start: 2018-09-19 | End: 2018-09-19 | Stop reason: HOSPADM

## 2018-09-19 RX ORDER — LIDOCAINE HYDROCHLORIDE 40 MG/ML
SOLUTION TOPICAL AS NEEDED
Status: DISCONTINUED | OUTPATIENT
Start: 2018-09-19 | End: 2018-09-19 | Stop reason: SURG

## 2018-09-19 RX ORDER — CEFAZOLIN SODIUM 2 G/100ML
2 INJECTION, SOLUTION INTRAVENOUS EVERY 8 HOURS
Status: COMPLETED | OUTPATIENT
Start: 2018-09-19 | End: 2018-09-20

## 2018-09-19 RX ORDER — DEXAMETHASONE 4 MG/1
4 TABLET ORAL EVERY 6 HOURS SCHEDULED
Status: DISCONTINUED | OUTPATIENT
Start: 2018-09-19 | End: 2018-09-20 | Stop reason: HOSPADM

## 2018-09-19 RX ORDER — LIDOCAINE HYDROCHLORIDE 10 MG/ML
INJECTION, SOLUTION INFILTRATION; PERINEURAL AS NEEDED
Status: DISCONTINUED | OUTPATIENT
Start: 2018-09-19 | End: 2018-09-19 | Stop reason: SURG

## 2018-09-19 RX ORDER — GLYCOPYRROLATE 0.2 MG/ML
INJECTION INTRAMUSCULAR; INTRAVENOUS AS NEEDED
Status: DISCONTINUED | OUTPATIENT
Start: 2018-09-19 | End: 2018-09-19 | Stop reason: SURG

## 2018-09-19 RX ORDER — HYDROCODONE BITARTRATE AND ACETAMINOPHEN 5; 325 MG/1; MG/1
1 TABLET ORAL ONCE AS NEEDED
Status: DISCONTINUED | OUTPATIENT
Start: 2018-09-19 | End: 2018-09-19 | Stop reason: HOSPADM

## 2018-09-19 RX ORDER — LABETALOL HYDROCHLORIDE 5 MG/ML
10 INJECTION, SOLUTION INTRAVENOUS EVERY 4 HOURS PRN
Status: DISCONTINUED | OUTPATIENT
Start: 2018-09-19 | End: 2018-09-20 | Stop reason: HOSPADM

## 2018-09-19 RX ORDER — SODIUM CHLORIDE 0.9 % (FLUSH) 0.9 %
1-10 SYRINGE (ML) INJECTION AS NEEDED
Status: DISCONTINUED | OUTPATIENT
Start: 2018-09-19 | End: 2018-09-19 | Stop reason: HOSPADM

## 2018-09-19 RX ORDER — CARVEDILOL 12.5 MG/1
12.5 TABLET ORAL 2 TIMES DAILY WITH MEALS
Status: DISCONTINUED | OUTPATIENT
Start: 2018-09-19 | End: 2018-09-20 | Stop reason: HOSPADM

## 2018-09-19 RX ORDER — ONDANSETRON 2 MG/ML
4 INJECTION INTRAMUSCULAR; INTRAVENOUS EVERY 6 HOURS PRN
Status: DISCONTINUED | OUTPATIENT
Start: 2018-09-19 | End: 2018-09-20 | Stop reason: HOSPADM

## 2018-09-19 RX ORDER — LIDOCAINE HYDROCHLORIDE 10 MG/ML
0.5 INJECTION, SOLUTION EPIDURAL; INFILTRATION; INTRACAUDAL; PERINEURAL ONCE AS NEEDED
Status: COMPLETED | OUTPATIENT
Start: 2018-09-19 | End: 2018-09-19

## 2018-09-19 RX ORDER — SPIRONOLACTONE 25 MG/1
25 TABLET ORAL DAILY
Status: DISCONTINUED | OUTPATIENT
Start: 2018-09-19 | End: 2018-09-20 | Stop reason: HOSPADM

## 2018-09-19 RX ORDER — PREGABALIN 75 MG/1
75 CAPSULE ORAL ONCE
Status: COMPLETED | OUTPATIENT
Start: 2018-09-19 | End: 2018-09-19

## 2018-09-19 RX ORDER — FAMOTIDINE 20 MG/1
20 TABLET, FILM COATED ORAL ONCE
Status: COMPLETED | OUTPATIENT
Start: 2018-09-19 | End: 2018-09-19

## 2018-09-19 RX ORDER — GABAPENTIN 300 MG/1
300 CAPSULE ORAL EVERY 12 HOURS SCHEDULED
Status: DISCONTINUED | OUTPATIENT
Start: 2018-09-19 | End: 2018-09-20 | Stop reason: HOSPADM

## 2018-09-19 RX ORDER — FUROSEMIDE 40 MG/1
40 TABLET ORAL DAILY
Status: DISCONTINUED | OUTPATIENT
Start: 2018-09-19 | End: 2018-09-20 | Stop reason: HOSPADM

## 2018-09-19 RX ORDER — ZOLPIDEM TARTRATE 5 MG/1
5 TABLET ORAL NIGHTLY PRN
Status: DISCONTINUED | OUTPATIENT
Start: 2018-09-19 | End: 2018-09-20 | Stop reason: HOSPADM

## 2018-09-19 RX ORDER — ONDANSETRON 2 MG/ML
INJECTION INTRAMUSCULAR; INTRAVENOUS AS NEEDED
Status: DISCONTINUED | OUTPATIENT
Start: 2018-09-19 | End: 2018-09-19 | Stop reason: SURG

## 2018-09-19 RX ORDER — CEFAZOLIN SODIUM 2 G/100ML
2 INJECTION, SOLUTION INTRAVENOUS ONCE
Status: COMPLETED | OUTPATIENT
Start: 2018-09-19 | End: 2018-09-19

## 2018-09-19 RX ORDER — FAMOTIDINE 10 MG/ML
20 INJECTION, SOLUTION INTRAVENOUS EVERY 12 HOURS SCHEDULED
Status: DISCONTINUED | OUTPATIENT
Start: 2018-09-19 | End: 2018-09-20 | Stop reason: HOSPADM

## 2018-09-19 RX ORDER — SODIUM CHLORIDE, SODIUM LACTATE, POTASSIUM CHLORIDE, CALCIUM CHLORIDE 600; 310; 30; 20 MG/100ML; MG/100ML; MG/100ML; MG/100ML
9 INJECTION, SOLUTION INTRAVENOUS CONTINUOUS
Status: DISCONTINUED | OUTPATIENT
Start: 2018-09-19 | End: 2018-09-20 | Stop reason: HOSPADM

## 2018-09-19 RX ORDER — BISACODYL 10 MG
10 SUPPOSITORY, RECTAL RECTAL DAILY PRN
Status: DISCONTINUED | OUTPATIENT
Start: 2018-09-19 | End: 2018-09-20 | Stop reason: HOSPADM

## 2018-09-19 RX ORDER — NALOXONE HCL 0.4 MG/ML
0.4 VIAL (ML) INJECTION
Status: DISCONTINUED | OUTPATIENT
Start: 2018-09-19 | End: 2018-09-20 | Stop reason: HOSPADM

## 2018-09-19 RX ORDER — ACETAMINOPHEN 500 MG
1000 TABLET ORAL ONCE
Status: COMPLETED | OUTPATIENT
Start: 2018-09-19 | End: 2018-09-19

## 2018-09-19 RX ORDER — FAMOTIDINE 20 MG/1
20 TABLET, FILM COATED ORAL EVERY 12 HOURS SCHEDULED
Status: DISCONTINUED | OUTPATIENT
Start: 2018-09-19 | End: 2018-09-20 | Stop reason: HOSPADM

## 2018-09-19 RX ORDER — HYDROMORPHONE HCL 110MG/55ML
2 PATIENT CONTROLLED ANALGESIA SYRINGE INTRAVENOUS EVERY 4 HOURS PRN
Status: DISCONTINUED | OUTPATIENT
Start: 2018-09-19 | End: 2018-09-20 | Stop reason: HOSPADM

## 2018-09-19 RX ORDER — DEXAMETHASONE SODIUM PHOSPHATE 4 MG/ML
4 INJECTION, SOLUTION INTRA-ARTICULAR; INTRALESIONAL; INTRAMUSCULAR; INTRAVENOUS; SOFT TISSUE EVERY 6 HOURS SCHEDULED
Status: DISCONTINUED | OUTPATIENT
Start: 2018-09-19 | End: 2018-09-20 | Stop reason: HOSPADM

## 2018-09-19 RX ORDER — SODIUM CHLORIDE AND POTASSIUM CHLORIDE 150; 900 MG/100ML; MG/100ML
100 INJECTION, SOLUTION INTRAVENOUS CONTINUOUS
Status: DISCONTINUED | OUTPATIENT
Start: 2018-09-19 | End: 2018-09-20 | Stop reason: HOSPADM

## 2018-09-19 RX ORDER — BISACODYL 5 MG/1
5 TABLET, DELAYED RELEASE ORAL DAILY PRN
Status: DISCONTINUED | OUTPATIENT
Start: 2018-09-19 | End: 2018-09-20 | Stop reason: HOSPADM

## 2018-09-19 RX ORDER — DEXAMETHASONE SODIUM PHOSPHATE 4 MG/ML
INJECTION, SOLUTION INTRA-ARTICULAR; INTRALESIONAL; INTRAMUSCULAR; INTRAVENOUS; SOFT TISSUE AS NEEDED
Status: DISCONTINUED | OUTPATIENT
Start: 2018-09-19 | End: 2018-09-19 | Stop reason: SURG

## 2018-09-19 RX ORDER — FLUOXETINE HYDROCHLORIDE 20 MG/1
20 CAPSULE ORAL DAILY
Status: DISCONTINUED | OUTPATIENT
Start: 2018-09-19 | End: 2018-09-20 | Stop reason: HOSPADM

## 2018-09-19 RX ORDER — ONDANSETRON 2 MG/ML
4 INJECTION INTRAMUSCULAR; INTRAVENOUS ONCE AS NEEDED
Status: DISCONTINUED | OUTPATIENT
Start: 2018-09-19 | End: 2018-09-19 | Stop reason: HOSPADM

## 2018-09-19 RX ORDER — OXYCODONE AND ACETAMINOPHEN 7.5; 325 MG/1; MG/1
1 TABLET ORAL EVERY 4 HOURS PRN
Status: DISCONTINUED | OUTPATIENT
Start: 2018-09-19 | End: 2018-09-20 | Stop reason: HOSPADM

## 2018-09-19 RX ORDER — ROCURONIUM BROMIDE 10 MG/ML
INJECTION, SOLUTION INTRAVENOUS AS NEEDED
Status: DISCONTINUED | OUTPATIENT
Start: 2018-09-19 | End: 2018-09-19 | Stop reason: SURG

## 2018-09-19 RX ORDER — FENTANYL CITRATE 50 UG/ML
50 INJECTION, SOLUTION INTRAMUSCULAR; INTRAVENOUS
Status: DISCONTINUED | OUTPATIENT
Start: 2018-09-19 | End: 2018-09-19 | Stop reason: HOSPADM

## 2018-09-19 RX ADMIN — FENTANYL CITRATE 25 MCG: 50 INJECTION, SOLUTION INTRAMUSCULAR; INTRAVENOUS at 08:06

## 2018-09-19 RX ADMIN — FAMOTIDINE 20 MG: 20 TABLET ORAL at 12:53

## 2018-09-19 RX ADMIN — PREGABALIN 75 MG: 75 CAPSULE ORAL at 07:11

## 2018-09-19 RX ADMIN — EPHEDRINE SULFATE 5 MG: 50 INJECTION INTRAMUSCULAR; INTRAVENOUS; SUBCUTANEOUS at 08:44

## 2018-09-19 RX ADMIN — GABAPENTIN 300 MG: 300 CAPSULE ORAL at 12:53

## 2018-09-19 RX ADMIN — FENTANYL CITRATE 50 MCG: 50 INJECTION INTRAMUSCULAR; INTRAVENOUS at 10:42

## 2018-09-19 RX ADMIN — EPHEDRINE SULFATE 10 MG: 50 INJECTION INTRAMUSCULAR; INTRAVENOUS; SUBCUTANEOUS at 08:07

## 2018-09-19 RX ADMIN — FUROSEMIDE 40 MG: 40 TABLET ORAL at 12:53

## 2018-09-19 RX ADMIN — OXYCODONE HYDROCHLORIDE 10 MG: 10 TABLET, FILM COATED, EXTENDED RELEASE ORAL at 07:11

## 2018-09-19 RX ADMIN — ATORVASTATIN CALCIUM 10 MG: 10 TABLET, FILM COATED ORAL at 20:40

## 2018-09-19 RX ADMIN — VECURONIUM BROMIDE 1 MG: 1 INJECTION, POWDER, LYOPHILIZED, FOR SOLUTION INTRAVENOUS at 09:11

## 2018-09-19 RX ADMIN — CEFAZOLIN SODIUM 2 G: 2 INJECTION, SOLUTION INTRAVENOUS at 16:11

## 2018-09-19 RX ADMIN — FAMOTIDINE 20 MG: 20 TABLET ORAL at 20:40

## 2018-09-19 RX ADMIN — FENTANYL CITRATE 25 MCG: 50 INJECTION, SOLUTION INTRAMUSCULAR; INTRAVENOUS at 08:09

## 2018-09-19 RX ADMIN — Medication 3 MG: at 10:05

## 2018-09-19 RX ADMIN — ROCURONIUM BROMIDE 10 MG: 10 SOLUTION INTRAVENOUS at 08:07

## 2018-09-19 RX ADMIN — ONDANSETRON 4 MG: 2 INJECTION INTRAMUSCULAR; INTRAVENOUS at 09:55

## 2018-09-19 RX ADMIN — MUPIROCIN 1 APPLICATION: 20 OINTMENT TOPICAL at 07:10

## 2018-09-19 RX ADMIN — ACETAMINOPHEN 1000 MG: 500 TABLET, FILM COATED ORAL at 07:11

## 2018-09-19 RX ADMIN — DEXAMETHASONE 4 MG: 4 TABLET ORAL at 17:40

## 2018-09-19 RX ADMIN — CEFAZOLIN SODIUM 2 G: 2 INJECTION, SOLUTION INTRAVENOUS at 07:59

## 2018-09-19 RX ADMIN — LIDOCAINE HYDROCHLORIDE 0.3 ML: 10 INJECTION, SOLUTION EPIDURAL; INFILTRATION; INTRACAUDAL; PERINEURAL at 07:11

## 2018-09-19 RX ADMIN — SPIRONOLACTONE 25 MG: 25 TABLET ORAL at 14:40

## 2018-09-19 RX ADMIN — FENTANYL CITRATE 25 MCG: 50 INJECTION, SOLUTION INTRAMUSCULAR; INTRAVENOUS at 08:44

## 2018-09-19 RX ADMIN — PROPOFOL 40 MG: 10 INJECTION, EMULSION INTRAVENOUS at 08:08

## 2018-09-19 RX ADMIN — DEXAMETHASONE SODIUM PHOSPHATE 8 MG: 4 INJECTION, SOLUTION INTRAMUSCULAR; INTRAVENOUS at 08:15

## 2018-09-19 RX ADMIN — SACUBITRIL AND VALSARTAN 1 TABLET: 24; 26 TABLET, FILM COATED ORAL at 20:40

## 2018-09-19 RX ADMIN — POTASSIUM CHLORIDE AND SODIUM CHLORIDE 100 ML/HR: 900; 150 INJECTION, SOLUTION INTRAVENOUS at 12:30

## 2018-09-19 RX ADMIN — SODIUM CHLORIDE, POTASSIUM CHLORIDE, SODIUM LACTATE AND CALCIUM CHLORIDE 9 ML/HR: 600; 310; 30; 20 INJECTION, SOLUTION INTRAVENOUS at 07:11

## 2018-09-19 RX ADMIN — FENTANYL CITRATE 25 MCG: 50 INJECTION, SOLUTION INTRAMUSCULAR; INTRAVENOUS at 10:20

## 2018-09-19 RX ADMIN — CARVEDILOL 12.5 MG: 12.5 TABLET, FILM COATED ORAL at 17:40

## 2018-09-19 RX ADMIN — DEXAMETHASONE 4 MG: 4 TABLET ORAL at 12:52

## 2018-09-19 RX ADMIN — ROCURONIUM BROMIDE 30 MG: 10 SOLUTION INTRAVENOUS at 08:08

## 2018-09-19 RX ADMIN — PROPOFOL 80 MG: 10 INJECTION, EMULSION INTRAVENOUS at 08:07

## 2018-09-19 RX ADMIN — ROCURONIUM BROMIDE 10 MG: 10 SOLUTION INTRAVENOUS at 08:44

## 2018-09-19 RX ADMIN — GLYCOPYRROLATE 0.4 MG: 0.2 INJECTION, SOLUTION INTRAMUSCULAR; INTRAVENOUS at 10:05

## 2018-09-19 RX ADMIN — LIDOCAINE HYDROCHLORIDE 50 MG: 10 INJECTION, SOLUTION INFILTRATION; PERINEURAL at 08:07

## 2018-09-19 RX ADMIN — LIDOCAINE HYDROCHLORIDE 1 EACH: 40 SOLUTION TOPICAL at 08:11

## 2018-09-19 RX ADMIN — FLUOXETINE 20 MG: 20 CAPSULE ORAL at 12:49

## 2018-09-19 RX ADMIN — FAMOTIDINE 20 MG: 20 TABLET ORAL at 07:11

## 2018-09-19 RX ADMIN — GABAPENTIN 300 MG: 300 CAPSULE ORAL at 20:40

## 2018-09-19 RX ADMIN — EPHEDRINE SULFATE 5 MG: 50 INJECTION INTRAMUSCULAR; INTRAVENOUS; SUBCUTANEOUS at 08:08

## 2018-09-19 RX ADMIN — EPHEDRINE SULFATE 5 MG: 50 INJECTION INTRAMUSCULAR; INTRAVENOUS; SUBCUTANEOUS at 08:43

## 2018-09-19 NOTE — ANESTHESIA POSTPROCEDURE EVALUATION
Patient: Arash Simmons    Procedure Summary     Date:  09/19/18 Room / Location:   [facility]    Anesthesia Start:  0759 Anesthesia Stop:      Procedure:  ANTERIOR CERVICAL DECOMPRESSION WITH FUSION C4-5 RIGHT (N/A Spine Cervical) Diagnosis:      Surgeon:  Israel Garcia MD Provider:  Bandar Schneider MD    Anesthesia Type:  general ASA Status:  3          Anesthesia Type: general  Last vitals  BP   155/87 (09/19/18 1032)   Temp   97.2 °F (36.2 °C) (09/19/18 1032)   Pulse   73 (09/19/18 1032)   Resp   16 (09/19/18 1032)     SpO2   100 % (09/19/18 1032)     Post Anesthesia Care and Evaluation    Patient location during evaluation: PACU  Patient participation: complete - patient participated  Level of consciousness: awake and alert  Pain score: 0  Pain management: adequate  Airway patency: patent  Anesthetic complications: No anesthetic complications  PONV Status: none  Cardiovascular status: hemodynamically stable and acceptable  Respiratory status: nonlabored ventilation, acceptable and nasal cannula  Hydration status: acceptable

## 2018-09-19 NOTE — OP NOTE
PREOPERATIVE DIAGNOSIS: C4-C5 stenosis/herniated disk with myelopathy.     POSTOPERATIVE DIAGNOSIS: C4-C5 stenosis/herniated disk with myelopathy.     PROCEDURES PERFORMED:  1.  Anterior cervical osteophytectomy and diskectomy C4-C5 to decompress neural elements.  2.  Anterior interbody fusion C4-C5 with DePuy interbody fusion cage and bone graft.  3.  Anterior cervical plating C4-C5 with DePuy Wiley Ford plate.     SURGEON: Israel Garcia MD    ASSISTANT: SIMRAN Mehta     ANESTHESIA: General.     DESCRIPTION OF PROCEDURE: The patient was given a preoperative dose of Ancef. He was given a general anesthetic. He was placed in the supine position. The neck was prepped and draped sterilely. A right-sided transverse incision was made. The platysma was split. The medial border of the SCM was identified. This was followed down to the prevertebral fascia. C4-C5 was identified by C-arm. The longus colli muscle was elevated and self-retaining retractors were placed below this. The anesthesiologist was asked to deflate the endotracheal cuff and re-inflate with smallest amount of pressure. At this point the decompression was performed. A complete diskectomy was performed using pituitaries and curettes. I took down the posterior longitudinal ligament with a micro Kerrison. There were multiple extruded disk fragments centrally into the right side. These were teased out with a micro nerve hook. Multiple disk fragments were removed. The neural elements appeared decompressed.     Then I trialed a cage. The appropriate size cage was size 6 large. I lightly decorticated the end plates. The disk space was modestly distracted. The cage was packed with bone graft. The cage was then placed at C4-C5. It seemed stable it was in good position under C-arm vision.     Next an anterior cervical plating was performed. A DePuy Wiley Ford plate was selected. It was fixed with 2 bone screws in C4 and 2 bone screws in C5. Screws obtained good bony  purchase. Once the screws were placed the cams were tightened to lock the screw to the plate. Final C-arm images showed the interbody fusion cage as well as the plate and screws in good position.     A final copious irrigation was undertaken. A Hemovac drain was placed. The wound was closed in layers using Vicryl. A sterile dressing was applied. Patient was awakened and transported to the recovery room in stable condition.

## 2018-09-19 NOTE — ANESTHESIA PROCEDURE NOTES
Airway  Urgency: elective    Airway not difficult    General Information and Staff    Patient location during procedure: OR  CRNA: DESEAN PANIAGUA    Indications and Patient Condition  Indications for airway management: airway protection    Preoxygenated: yes  MILS not maintained throughout  Mask difficulty assessment: 1 - vent by mask    Final Airway Details  Final airway type: endotracheal airway      Successful airway: ETT  Cuffed: yes   Successful intubation technique: video laryngoscopy  Facilitating devices/methods: intubating stylet  Endotracheal tube insertion site: oral  Blade: Sarah  Blade size: 3  ETT size: 7.5 mm  Cormack-Lehane Classification: grade I - full view of glottis  Placement verified by: chest auscultation and capnometry   Measured from: lips  ETT to lips (cm): 22  Number of attempts at approach: 1    Additional Comments  Negative epigastric sounds, Breath sound equal bilaterally with symmetric chest rise and fall

## 2018-09-19 NOTE — ANESTHESIA PREPROCEDURE EVALUATION
Anesthesia Evaluation                  Airway   Mallampati: II  Dental      Pulmonary    Cardiovascular     (+) hypertension, CHF,       Neuro/Psych  GI/Hepatic/Renal/Endo      Musculoskeletal     Abdominal    Substance History      OB/GYN          Other        ROS/Med Hx Other: 20 percent ef  bivent                 Anesthesia Plan    ASA 3     general     intravenous induction   Anesthetic plan, all risks, benefits, and alternatives have been provided, discussed and informed consent has been obtained with: patient.

## 2018-09-19 NOTE — PLAN OF CARE
Problem: Patient Care Overview  Goal: Plan of Care Review  Outcome: Ongoing (interventions implemented as appropriate)   09/19/18 1811   Coping/Psychosocial   Plan of Care Reviewed With patient   Plan of Care Review   Progress improving   OTHER   Outcome Summary pt denies pain ambulates with staff voids with no issues iv site CDI dressing CDI James drain intact will continue to monitor

## 2018-09-19 NOTE — H&P
Patient Name: Arash Simmons  MRN: [id number]  : 1960  DOS: 2018    Attending: Israel Garcia MD    Primary Care Provider: Sundeep Stone MD      Chief complaint:  neck pain    Subjective   Patient is a 58 y.o. male presented for ACDF by Dr. Garcia under GA. He tolerated surgery well and is admitted for further medical management. His neck had been painful for about a year. He had numbness, tingling and pain down the right arm.     PROCEDURES PERFORMED:  1.  Anterior cervical osteophytectomy and diskectomy C4-C5 to decompress neural elements.  2.  Anterior interbody fusion C4-C5 with DePuy interbody fusion cage and bone graft.  3.  Anterior cervical plating C4-C5 with DePuy [place] plate.     She has a history of HTN, HLD, cardiomyopathy and CHF. She is followed by Dr. Naqvi, cardiology, and received preop cardiac clearance.    When seen postop he is doing well. His pain is well controlled. Still has some right arm numbness, but says seems to be improving. He denies nausea, shortness of breath or chest pain. No hx of DVT or PE.    ( Above is noted/ agree. Much improved RUE symptoms after surgery. No other complaints. )wy    Allergies:  No Known Allergies    Meds:  Prescriptions Prior to Admission   Medication Sig Dispense Refill Last Dose   • aspirin 81 MG tablet Take 81 mg by mouth Daily.   2018 at 0630   • atorvastatin (LIPITOR) 10 MG tablet Take 1 tablet by mouth Daily. 90 tablet 4 2018 at 0630   • carvedilol (COREG) 12.5 MG tablet Take 1 tablet by mouth 2 (Two) Times a Day With Meals. 180 tablet 4 2018 at 0430   • Cholecalciferol (VITAMIN D3) 2000 UNITS capsule Take 1 capsule by mouth daily.   Past Month at Unknown time   • FLUoxetine (PROzac) 20 MG tablet Take 1 tablet by mouth Daily. 90 tablet 1 2018 at 0630   • furosemide (LASIX) 40 MG tablet Take 1 tablet by mouth Daily. 90 tablet 4 2018 at 0630   • gabapentin (NEURONTIN) 300 MG capsule 1 po daily today then 1 PO  "BID tomorrow and then 1 PO TID the next day and all subsequent days (Patient taking differently: Take 300 mg by mouth 2 (Two) Times a Day.) 90 capsule 3 9/18/2018 at 0630   • sacubitril-valsartan (ENTRESTO) 24-26 MG tablet Take 1 tablet by mouth Every 12 (Twelve) Hours. 180 tablet 4 9/19/2018 at 0430   • spironolactone (ALDACTONE) 25 MG tablet Take 1 tablet by mouth Daily. 90 tablet 4 9/18/2018 at 0630       History:   Past Medical History:   Diagnosis Date   • Anxiety    • Arthritis    • Chest pain    • CHF (congestive heart failure) (CMS/HCC)     Chronic systolic   • Dyspnea on effort    • Fatigue    • Hyperlipidemia    • Hypertension    • Irritability    • Left bundle branch block    • Lower back pain    • Lower leg edema    • Nonischemic cardiomyopathy (CMS/HCC)    • Shortness of breath    • URTI (acute upper respiratory infection)      Past Surgical History:   Procedure Laterality Date   • ADENOIDECTOMY     • CARDIAC CATHETERIZATION  2017    X2   • CARDIAC DEFIBRILLATOR PLACEMENT      BSC biventricular ICD August 2015   • ELBOW PROCEDURE Right 1973   • PACEMAKER IMPLANTATION  2015    ICD, PLACED PER [facility]UEL AND NOW F/U WITH CONSTANTINO    • TONSILLECTOMY     • TOTAL KNEE ARTHROPLASTY Left 10/2017     Family History   Problem Relation Age of Onset   • COPD Mother    • Emphysema Mother    • Diabetes Mother    • COPD Father    • Other Father         black lung      Social History   Substance Use Topics   • Smoking status: Former Smoker     Packs/day: 2.00     Years: 30.00     Quit date: 7/27/1999   • Smokeless tobacco: Never Used   • Alcohol use Yes      Comment: 2 beers per day   He is  with 3 children. 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     Review of Systems  Pertinent items are noted in HPI, all other systems reviewed and negative    Vital Signs  /75 (BP Location: Right arm, Patient Position: Lying)   Pulse 89   Temp 97.8 °F (36.6 °C) (Oral)   Resp 17   Ht 177.8 cm (70\") "   Wt 97.5 kg (215 lb)   SpO2 98%   BMI 30.85 kg/m²     Physical Exam:    General Appearance:    Alert, cooperative, in no acute distress   Head:    Normocephalic, without obvious abnormality, atraumatic   Eyes:            Lids and lashes normal, conjunctivae and sclerae normal, no   icterus, no pallor, corneas clear    Ears:    Ears appear intact with no abnormalities noted   Neck:   Surgical dressing CDI. BEATRICE present. Soft c-collar present   Lungs:     Clear to auscultation,respirations regular, even and                   unlabored    Heart:    Regular rhythm and normal rate, normal S1 and S2, no            murmur, no gallop, no rub    Abdomen:     Normal bowel sounds, no masses, no organomegaly, soft        non-tender, non-distended, no guarding, no rebound                 tenderness   Genitalia:    Deferred   Extremities:   Moves all extremities well, no edema, no cyanosis, no              redness   Pulses:   Pulses palpable and equal bilaterally   Skin:   No bleeding, bruising or rash   Neurologic:   Cranial nerves 2 - 12 grossly intact, sensation intact      I reviewed the patient's new clinical results.         Results from last 7 days  Lab Units 09/13/18  1422   WBC 10*3/mm3 7.36   HEMOGLOBIN g/dL 13.3   HEMATOCRIT % 40.9   PLATELETS 10*3/mm3 251           Invalid input(s): LABALBU, PROT  Lab Results   Component Value Date    HGBA1C 5.6 08/20/2015       Assessment and Plan:   Principal Problem:    S/P cervical spinal fusion  Active Problems:    Neck pain    Chronic systolic congestive heart failure (CMS/HCC)    Hypertension    Hyperlipidemia    Pacemaker      Plan  1. PT- ambulate  2. Pain control-prns   3. IS-encourage  4. DVT proph- [facility]  5. Bowel regimen  6. Resume home medications as appropriate  7. Monitor post-op labs  8. DC planning for home, likely tomorrow    HTN, HLD, CHF, PPM  - Continue home  - Monitor BP   - Holding parameters for BP meds  - Labetalol PRN for SBP>170    I have personally  performed the evaluation on this patient. My history is consistent  with HPI obtained. My exam findings are listed above. I have personally reviewed and discussed the above formulated treatment plan with pt, wife, and AH.Manish.      TONY Mota  09/19/18  11:58 AM

## 2018-09-19 NOTE — PLAN OF CARE
Problem: Pain, Acute (Adult)  Goal: Acceptable Pain Control/Comfort Level  Outcome: Ongoing (interventions implemented as appropriate)   09/19/18 1811   Pain, Acute (Adult)   Acceptable Pain Control/Comfort Level making progress toward outcome

## 2018-09-19 NOTE — H&P
Pre-Op H&P  Arash Simmons  [id number]  1960    Chief complaint: neck pain, right arm pain and right hand numbness    HPI:    Patient is a 58 y.o.male who presents with chronic neck pain and right extremity numbness     Review of Systems:  General ROS: negative for chills, fever or skin lesions;  No changes since last office visit  Cardiovascular ROS: no chest pain or dyspnea on exertion  Respiratory ROS: no cough, shortness of breath, or wheezing    Allergies: No Known Allergies    Home Meds:    No current facility-administered medications on file prior to encounter.      Current Outpatient Prescriptions on File Prior to Encounter   Medication Sig Dispense Refill   • aspirin 81 MG tablet Take 81 mg by mouth Daily.     • atorvastatin (LIPITOR) 10 MG tablet Take 1 tablet by mouth Daily. 90 tablet 4   • carvedilol (COREG) 12.5 MG tablet Take 1 tablet by mouth 2 (Two) Times a Day With Meals. 180 tablet 4   • gabapentin (NEURONTIN) 300 MG capsule 1 po daily today then 1 PO BID tomorrow and then 1 PO TID the next day and all subsequent days (Patient taking differently: Take 300 mg by mouth 2 (Two) Times a Day.) 90 capsule 3   • sacubitril-valsartan (ENTRESTO) 24-26 MG tablet Take 1 tablet by mouth Every 12 (Twelve) Hours. 180 tablet 4   • spironolactone (ALDACTONE) 25 MG tablet Take 1 tablet by mouth Daily. 90 tablet 4   • Cholecalciferol (VITAMIN D3) 2000 UNITS capsule Take 1 capsule by mouth daily.     • FLUoxetine (PROzac) 20 MG tablet Take 1 tablet by mouth Daily. 90 tablet 1   • furosemide (LASIX) 40 MG tablet Take 1 tablet by mouth Daily. 90 tablet 4       PMH:   Past Medical History:   Diagnosis Date   • Anxiety    • Arthritis    • Chest pain    • CHF (congestive heart failure) (CMS/HCC)     Chronic systolic   • Dyspnea on effort    • Fatigue    • Hyperlipidemia    • Hypertension    • Irritability    • Left bundle branch block    • Lower back pain    • Lower leg edema    • Nonischemic cardiomyopathy  (CMS/[facility])    • Shortness of breath    • URTI (acute upper respiratory infection)      PSH:    Past Surgical History:   Procedure Laterality Date   • ADENOIDECTOMY     • CARDIAC CATHETERIZATION  2017    X2   • CARDIAC DEFIBRILLATOR PLACEMENT      BS biventricular ICD August 2015   • ELBOW PROCEDURE Right 1973   • PACEMAKER IMPLANTATION  2015    [facility] AND NOW F/U WITH CONSTANTINO    • TONSILLECTOMY     • TOTAL KNEE ARTHROPLASTY Left 10/2017       Immunization History:  Influenza: no  Pneumococcal: yes  Tetanus: unknown    Social History:   Tobacco:   History   Smoking Status   • Former Smoker   • Packs/day: 2.00   • Years: 30.00   • Quit date: 7/27/1999   Smokeless Tobacco   • Never Used      Alcohol:     History   Alcohol Use   • Yes     Comment: 2 beers per day       Vitals:          HR: 71           O2 sat:  97% room air             Temp: 96.8             B/P 137/76    Physical Exam:  General Appearance:    Alert, cooperative, no distress, appears stated age   Head:    Normocephalic, without obvious abnormality, atraumatic   Lungs:     Clear to auscultation bilaterally, respirations unlabored    Heart:   Regular rate and rhythm, S1 and S2 normal, no murmur, rub    or gallop    Abdomen:    Soft, non-tender.  +bowel sounds   Breast Exam:    deferred   Genitalia:    deferred   Extremities:   Extremities normal, atraumatic, no cyanosis or edema   Skin:   Skin color, texture, turgor normal, sustained a skin nick while being shaved for surgery located at the operative site   Neurologic:   Grossly intact   Results Review  I reviewed the patient's new clinical results.    Cancer Staging (if applicable)  Cancer Patient: __ yes __no __unknown; If yes, clinical stage T:__ N:__M:__, stage group or __N/A    Impression: 58 year old gentleman presenting with neck and right extremity pain and numbness    Plan: Anterior Cervical decompression and fusion    LIBBY Arnold   9/19/2018   7:03 AM

## 2018-09-20 VITALS
TEMPERATURE: 98.2 F | BODY MASS INDEX: 30.78 KG/M2 | DIASTOLIC BLOOD PRESSURE: 65 MMHG | HEIGHT: 70 IN | WEIGHT: 215 LBS | SYSTOLIC BLOOD PRESSURE: 124 MMHG | OXYGEN SATURATION: 93 % | RESPIRATION RATE: 16 BRPM | HEART RATE: 92 BPM

## 2018-09-20 PROBLEM — D72.829 LEUKOCYTOSIS: Status: ACTIVE | Noted: 2018-09-20

## 2018-09-20 PROBLEM — G89.18 ACUTE POSTOPERATIVE PAIN: Status: ACTIVE | Noted: 2018-09-20

## 2018-09-20 LAB
ANION GAP SERPL CALCULATED.3IONS-SCNC: 4 MMOL/L (ref 3–11)
BUN BLD-MCNC: 11 MG/DL (ref 9–23)
BUN/CREAT SERPL: 11.3 (ref 7–25)
CALCIUM SPEC-SCNC: 8.7 MG/DL (ref 8.7–10.4)
CHLORIDE SERPL-SCNC: 107 MMOL/L (ref 99–109)
CO2 SERPL-SCNC: 26 MMOL/L (ref 20–31)
CREAT BLD-MCNC: 0.97 MG/DL (ref 0.6–1.3)
DEPRECATED RDW RBC AUTO: 43.7 FL (ref 37–54)
ERYTHROCYTE [DISTWIDTH] IN BLOOD BY AUTOMATED COUNT: 13.1 % (ref 11.3–14.5)
GFR SERPL CREATININE-BSD FRML MDRD: 80 ML/MIN/1.73
GLUCOSE BLD-MCNC: 172 MG/DL (ref 70–100)
HCT VFR BLD AUTO: 40.2 % (ref 38.9–50.9)
HGB BLD-MCNC: 13.1 G/DL (ref 13.1–17.5)
MCH RBC QN AUTO: 29.8 PG (ref 27–31)
MCHC RBC AUTO-ENTMCNC: 32.6 G/DL (ref 32–36)
MCV RBC AUTO: 91.4 FL (ref 80–99)
PLATELET # BLD AUTO: 261 10*3/MM3 (ref 150–450)
PMV BLD AUTO: 10 FL (ref 6–12)
POTASSIUM BLD-SCNC: 4.2 MMOL/L (ref 3.5–5.5)
RBC # BLD AUTO: 4.4 10*6/MM3 (ref 4.2–5.76)
SODIUM BLD-SCNC: 137 MMOL/L (ref 132–146)
WBC NRBC COR # BLD: 13.49 10*3/MM3 (ref 3.5–10.8)

## 2018-09-20 PROCEDURE — 80048 BASIC METABOLIC PNL TOTAL CA: CPT | Performed by: NURSE PRACTITIONER

## 2018-09-20 PROCEDURE — 25810000003 SODIUM CHLORIDE 0.9 % WITH KCL 20 MEQ 20-0.9 MEQ/L-% SOLUTION: Performed by: ORTHOPAEDIC SURGERY

## 2018-09-20 PROCEDURE — 25010000002 DEXAMETHASONE PER 1 MG: Performed by: ORTHOPAEDIC SURGERY

## 2018-09-20 PROCEDURE — 85027 COMPLETE CBC AUTOMATED: CPT | Performed by: NURSE PRACTITIONER

## 2018-09-20 PROCEDURE — 25010000003 CEFAZOLIN IN DEXTROSE 2-4 GM/100ML-% SOLUTION

## 2018-09-20 RX ORDER — DOCUSATE SODIUM 100 MG/1
100 CAPSULE, LIQUID FILLED ORAL 2 TIMES DAILY
Qty: 60 CAPSULE | Refills: 0 | Status: SHIPPED | OUTPATIENT
Start: 2018-09-20 | End: 2018-12-27

## 2018-09-20 RX ORDER — HYDROCODONE BITARTRATE AND ACETAMINOPHEN 7.5; 325 MG/1; MG/1
1 TABLET ORAL EVERY 6 HOURS PRN
Start: 2018-09-20 | End: 2018-12-27

## 2018-09-20 RX ADMIN — DEXAMETHASONE SODIUM PHOSPHATE 4 MG: 4 INJECTION, SOLUTION INTRAMUSCULAR; INTRAVENOUS at 00:16

## 2018-09-20 RX ADMIN — FUROSEMIDE 40 MG: 40 TABLET ORAL at 08:39

## 2018-09-20 RX ADMIN — SPIRONOLACTONE 25 MG: 25 TABLET ORAL at 08:40

## 2018-09-20 RX ADMIN — FLUOXETINE 20 MG: 20 CAPSULE ORAL at 08:40

## 2018-09-20 RX ADMIN — FAMOTIDINE 20 MG: 20 TABLET ORAL at 08:40

## 2018-09-20 RX ADMIN — POTASSIUM CHLORIDE AND SODIUM CHLORIDE 100 ML/HR: 900; 150 INJECTION, SOLUTION INTRAVENOUS at 00:15

## 2018-09-20 RX ADMIN — GABAPENTIN 300 MG: 300 CAPSULE ORAL at 08:40

## 2018-09-20 RX ADMIN — CARVEDILOL 12.5 MG: 12.5 TABLET, FILM COATED ORAL at 08:40

## 2018-09-20 RX ADMIN — SACUBITRIL AND VALSARTAN 1 TABLET: 24; 26 TABLET, FILM COATED ORAL at 08:39

## 2018-09-20 RX ADMIN — DEXAMETHASONE SODIUM PHOSPHATE 4 MG: 4 INJECTION, SOLUTION INTRAMUSCULAR; INTRAVENOUS at 05:54

## 2018-09-20 RX ADMIN — CEFAZOLIN SODIUM 2 G: 2 INJECTION, SOLUTION INTRAVENOUS at 00:18

## 2018-09-20 NOTE — PLAN OF CARE
Problem: Patient Care Overview  Goal: Plan of Care Review  Outcome: Ongoing (interventions implemented as appropriate)   09/20/18 0510   Coping/Psychosocial   Plan of Care Reviewed With patient;spouse   Plan of Care Review   Progress improving   OTHER   Outcome Summary Pt had uneventful night. Pt c/o anterior neck pain with pain rating of 2-3, declined pain meds. Pt ambulated in [place] several times with spouse. Paced per tele. Pt pleasant, cooperative. Pt anticipating discharge to home on Thurs. VSS       Problem: Pain, Acute (Adult)  Goal: Identify Related Risk Factors and Signs and Symptoms  Outcome: Ongoing (interventions implemented as appropriate)

## 2018-09-20 NOTE — PROGRESS NOTES
Discharge Planning Assessment  [facility]     Patient Name: Arash Simmons  MRN: [id number]  Today's Date: 9/20/2018    Admit Date: 9/19/2018          Discharge Needs Assessment     Row Name 09/20/18 0955       Living Environment    Lives With spouse;child(holly), adult    Current Living Arrangements home/apartment/condo    Primary Care Provided by self    Provides Primary Care For no one    Family Caregiver if Needed spouse    Able to Return to Prior Arrangements yes       Transition Planning    Patient/Family Anticipates Transition to home    Transportation Anticipated family or friend will provide       Discharge Needs Assessment    Equipment Currently Used at Home walker, rolling;cane, straight            Discharge Plan     Row Name 09/20/18 0957       Plan    Plan Home    Patient/Family in Agreement with Plan yes    Plan Comments Spoke with patient and wife at bedside. They live with their son in a one story house in [facility]. [facility] he was independent with ADL's and mobility. He denies any needs at this time, therapy eval pending. Plan is home. CM following.     Final Discharge Disposition Code 01 - home or self-care        Destination     No service coordination in this encounter.      Durable Medical Equipment     No service coordination in this encounter.      Dialysis/Infusion     No service coordination in this encounter.      Home Medical Care     No service coordination in this encounter.      Social Care     No service coordination in this encounter.                Demographic Summary     Row Name 09/20/18 0955       General Information    Arrived From home    Reason for Consult discharge planning            Functional Status     Row Name 09/20/18 0955       Functional Status    Usual Activity Tolerance good    Current Activity Tolerance moderate            Psychosocial    No documentation.           Abuse/Neglect    No documentation.           Legal    No documentation.           Substance Abuse    No  documentation.           Patient Forms    No documentation.         Carmina Rios RN

## 2018-09-20 NOTE — DISCHARGE SUMMARY
Patient Name: Arash Simmons  MRN: [id number]  : 1960  DOS: 2018    Attending: Israel Garcia MD    Primary Care Provider: Sundeep Stone MD    Date of Admission:.2018  6:33 AM    Date of Discharge:  2018    Discharge Diagnosis: Principal Problem:    S/P cervical spinal fusion  Active Problems:    Neck pain    Chronic systolic congestive heart failure (CMS/HCC)    Hypertension    Hyperlipidemia    Pacemaker    Acute postoperative pain    Leukocytosis, mild, likely reactive      Hospital Course  Patient is a 58 y.o. male presented for ACDF by Dr. Garcia under GA. He tolerated surgery well and was admitted for further medical management. His neck had been painful for about a year. He had numbness, tingling and pain down the right arm.     He has a history of HTN, HLD, cardiomyopathy and CHF. He is followed by Dr. Naqvi, cardiology, and received preop cardiac clearance.      After surgery he had significant improvement in his symptoms.    Patient was provided pain medications as needed for pain control.    Adjustments were made to pain medications to optimize postop pain management. Risks and benefits of opiate medications discussed with patient.    He used an IS for atelectasis prophylaxis and mechanicals for DVT prophylaxis.  Home medications were resumed as appropriate, and labs were monitored and remained fairly stable.     With the progress he has made, he is ready for DC home today.    Discussed with patient regarding plan and he shows understanding and agreement.        Procedures Performed  PREOPERATIVE DIAGNOSIS: C4-C5 stenosis/herniated disk with myelopathy.      POSTOPERATIVE DIAGNOSIS: C4-C5 stenosis/herniated disk with myelopathy.      PROCEDURES PERFORMED:  1.  Anterior cervical osteophytectomy and diskectomy C4-C5 to decompress neural elements.  2.  Anterior interbody fusion C4-C5 with DePuy interbody fusion cage and bone graft.  3.  Anterior cervical plating C4-C5 with DePuy  "[place] plate.      SURGEON: Israel Garcia MD       Pertinent Test Results:    I reviewed the patient's new clinical results.     Results from last 7 days  Lab Units 18  0452 18  1422   WBC 10*3/mm3 13.49* 7.36   HEMOGLOBIN g/dL 13.1 13.3   HEMATOCRIT % 40.2 40.9   PLATELETS 10*3/mm3 261 251       Results from last 7 days  Lab Units 18  0452   SODIUM mmol/L 137   POTASSIUM mmol/L 4.2   CHLORIDE mmol/L 107   CO2 mmol/L 26.0   BUN mg/dL 11   CREATININE mg/dL 0.97   CALCIUM mg/dL 8.7   GLUCOSE mg/dL 172*     I reviewed the patient's new imaging including images and reports.    Discharge Assessment:    Vital Signs  /65 (BP Location: Right arm, Patient Position: Lying)   Pulse 92   Temp 98.2 °F (36.8 °C) (Oral)   Resp 16   Ht 177.8 cm (70\")   Wt 97.5 kg (215 lb)   SpO2 93%   BMI 30.85 kg/m²   Temp (24hrs), Av.1 °F (36.7 °C), Min:97.5 °F (36.4 °C), Max:98.7 °F (37.1 °C)      General Appearance:    Alert, cooperative, in no acute distress   Lungs:     Clear to auscultation,respirations regular, even and                   unlabored    Heart:    Regular rhythm and normal rate, normal S1 and S2   Abdomen:     Normal bowel sounds, no masses, no organomegaly, soft        non-tender, non-distended, no guarding, no rebound                 tenderness   Extremities:   Moves all extremities well, no edema, no cyanosis, no              redness   Pulses:   Pulses palpable and equal bilaterally   Skin:   No bleeding, bruising or rash. Neck dressing CDI. BEATRICE- out prior to discharge. Soft c-collar present   Neurologic:   Cranial nerves 2 - 12 grossly intact, sensation intact       Discharge Disposition: Home    Discharge Medications     Discharge Medications      New Medications      Instructions Start Date   docusate sodium 100 MG capsule  Commonly known as:  COLACE   100 mg, Oral, 2 Times Daily      HYDROcodone-acetaminophen 7.5-325 MG per tablet  Commonly known as:  NORCO   1 tablet, Oral, Every 6 " Hours PRN         Changes to Medications      Instructions Start Date   gabapentin 300 MG capsule  Commonly known as:  NEURONTIN  What changed:  · how much to take  · how to take this  · when to take this  · additional instructions   1 po daily today then 1 PO BID tomorrow and then 1 PO TID the next day and all subsequent days         Continue These Medications      Instructions Start Date   aspirin 81 MG tablet   81 mg, Oral, Daily      atorvastatin 10 MG tablet  Commonly known as:  LIPITOR   10 mg, Oral, Daily      carvedilol 12.5 MG tablet  Commonly known as:  COREG   12.5 mg, Oral, 2 Times Daily With Meals      FLUoxetine 20 MG tablet  Commonly known as:  PROzac   20 mg, Oral, Daily      furosemide 40 MG tablet  Commonly known as:  LASIX   40 mg, Oral, Daily      sacubitril-valsartan 24-26 MG tablet  Commonly known as:  ENTRESTO   1 tablet, Oral, Every 12 Hours Scheduled      spironolactone 25 MG tablet  Commonly known as:  ALDACTONE   25 mg, Oral, Daily      Vitamin D3 2000 units capsule   1 capsule, Oral, Daily             Discharge Diet: Regular diet    Activity at Discharge: ambulate    Follow-up Appointments  Dr. Garcia per his orders    I have personally performed the evaluation on this patient. My history is consistent  with HPI obtained. My exam findings are listed above. I have personally reviewed and discussed the above formulated treatment plan with pt, wife, and AH.OTNY.    TONY Mota  09/20/18  11:00 AM

## 2018-10-02 DIAGNOSIS — G62.9 NEUROPATHY: ICD-10-CM

## 2018-10-02 RX ORDER — GABAPENTIN 300 MG/1
CAPSULE ORAL
Qty: 90 CAPSULE | Refills: 3 | Status: CANCELLED | OUTPATIENT
Start: 2018-10-02

## 2018-10-04 ENCOUNTER — TELEPHONE (OUTPATIENT)
Dept: FAMILY MEDICINE CLINIC | Facility: CLINIC | Age: 58
End: 2018-10-04

## 2018-10-04 DIAGNOSIS — G62.9 NEUROPATHY: ICD-10-CM

## 2018-10-04 RX ORDER — GABAPENTIN 300 MG/1
300 CAPSULE ORAL 3 TIMES DAILY
Qty: 90 CAPSULE | Refills: 0 | Status: SHIPPED | OUTPATIENT
Start: 2018-10-04 | End: 2018-11-16 | Stop reason: SDUPTHER

## 2018-10-04 NOTE — TELEPHONE ENCOUNTER
Please call, confirm if taking one twice a day or 3 times a day.      ------------------------  Rahat santos

## 2018-10-04 NOTE — TELEPHONE ENCOUNTER
----- Message from Evangelina Lee sent at 10/4/2018 10:46 AM EDT -----  Contact: ERICA CONNELL PATIENT, BUT HE WILL BE OUT OF HIS MEDICATION BY TONIGHT, HE WILL NOT HAVE A DOSE TO TAKE AT BEDTIME. AND HE JUST HAD SURGERY. AND HE HAS A REFILL REQUEST FROM [facility] . HE IS ASKING IF YOU CAN REFILL THAT FOR HIM TODAY.       Sign Interface, Surescripts In Reorder from Order: [id number]  gabapentin (NEURONTIN) 300 MG capsule [Pharmacy Med Name: GABAPENTIN 300MG CAP] [[id number]]   Order Details   Dose, Route, Frequency: As Directed   Dispense Quantity:  90 capsule Refills:  3 Fills remaining:  --         Sig: TAKE 1 CAPSULE BY MOUTH TODAY, THEN 1 CAPSULE TWICE DAILY TOMORROW, THEN 1 CAPSULE 3 TIMES DAILY AND ALL SUBSEQUENT        Written Date:  -- Expiration Date:  -- Ordering Date:  10/02/18   Start Date:  10/02/18 End Date:  --          Ordering Provider:  -- Phone:  -- Fax:  --   Address:  -- NPI:  --

## 2018-11-16 ENCOUNTER — TELEPHONE (OUTPATIENT)
Dept: FAMILY MEDICINE CLINIC | Facility: CLINIC | Age: 58
End: 2018-11-16

## 2018-11-16 DIAGNOSIS — G62.9 NEUROPATHY: ICD-10-CM

## 2018-11-16 RX ORDER — GABAPENTIN 300 MG/1
CAPSULE ORAL
Qty: 90 CAPSULE | Refills: 0 | Status: SHIPPED | OUTPATIENT
Start: 2018-11-16 | End: 2019-01-18 | Stop reason: SDUPTHER

## 2018-11-16 NOTE — TELEPHONE ENCOUNTER
Please call, requested Gabapentin sent to pharmacy. He is due for office visit with Dr Stone for next refill.

## 2018-12-27 ENCOUNTER — OFFICE VISIT (OUTPATIENT)
Dept: FAMILY MEDICINE CLINIC | Facility: CLINIC | Age: 58
End: 2018-12-27

## 2018-12-27 VITALS
TEMPERATURE: 97.6 F | HEIGHT: 70 IN | DIASTOLIC BLOOD PRESSURE: 72 MMHG | SYSTOLIC BLOOD PRESSURE: 112 MMHG | HEART RATE: 80 BPM | BODY MASS INDEX: 31.35 KG/M2 | RESPIRATION RATE: 18 BRPM | WEIGHT: 219 LBS

## 2018-12-27 DIAGNOSIS — J06.9 ACUTE URI: Primary | ICD-10-CM

## 2018-12-27 DIAGNOSIS — R05.9 COUGH: ICD-10-CM

## 2018-12-27 PROCEDURE — 99213 OFFICE O/P EST LOW 20 MIN: CPT | Performed by: PHYSICIAN ASSISTANT

## 2018-12-27 RX ORDER — INFLUENZA VIRUS VACCINE 15; 15; 15; 15 UG/.5ML; UG/.5ML; UG/.5ML; UG/.5ML
SUSPENSION INTRAMUSCULAR
COMMUNITY
Start: 2018-11-11 | End: 2018-12-27

## 2018-12-27 RX ORDER — AMOXICILLIN AND CLAVULANATE POTASSIUM 875; 125 MG/1; MG/1
1 TABLET, FILM COATED ORAL 2 TIMES DAILY
Qty: 14 TABLET | Refills: 0 | Status: SHIPPED | OUTPATIENT
Start: 2018-12-27 | End: 2019-01-07

## 2018-12-27 RX ORDER — METHYLPREDNISOLONE 4 MG/1
TABLET ORAL
Qty: 21 EACH | Refills: 0 | Status: SHIPPED | OUTPATIENT
Start: 2018-12-27 | End: 2019-03-05

## 2018-12-27 NOTE — PROGRESS NOTES
"Samuel Simmons is a 58 y.o. male.     URI    This is a new problem. The current episode started in the past 7 days. The problem has been gradually worsening. There has been no fever. Associated symptoms include congestion, coughing, headaches, rhinorrhea, a sore throat and wheezing. Pertinent negatives include no abdominal pain, chest pain, diarrhea, dysuria, ear pain, joint pain, joint swelling, nausea, neck pain, plugged ear sensation, rash, sinus pain, sneezing, swollen glands or vomiting. He has tried acetaminophen (mucinex) for the symptoms. The treatment provided mild relief.        The following portions of the patient's history were reviewed and updated as appropriate: allergies, current medications, past family history, past medical history, past social history, past surgical history and problem list.    Review of Systems   Constitutional: Positive for fatigue.   HENT: Positive for congestion, postnasal drip, rhinorrhea, sinus pressure and sore throat. Negative for ear pain, sinus pain, sneezing and trouble swallowing.    Eyes: Negative.    Respiratory: Positive for cough and wheezing.    Cardiovascular: Negative for chest pain.   Gastrointestinal: Negative for abdominal pain, diarrhea, nausea and vomiting.   Genitourinary: Negative for dysuria.   Musculoskeletal: Negative for joint pain and neck pain.   Skin: Negative for rash.   Neurological: Positive for headaches.       Objective    Blood pressure 112/72, pulse 80, temperature 97.6 °F (36.4 °C), resp. rate 18, height 177.8 cm (70\"), weight 99.3 kg (219 lb).     Physical Exam   Constitutional: He is oriented to person, place, and time. He appears well-developed and well-nourished.   HENT:   Head: Normocephalic and atraumatic.   Right Ear: External ear and ear canal normal. Tympanic membrane is retracted. Tympanic membrane is not perforated, not erythematous and not bulging.   Left Ear: External ear normal. Tympanic membrane is retracted. " Tympanic membrane is not perforated, not erythematous and not bulging.   Nose: Mucosal edema present. Right sinus exhibits no maxillary sinus tenderness and no frontal sinus tenderness. Left sinus exhibits no maxillary sinus tenderness and no frontal sinus tenderness.   Mouth/Throat: Oropharynx is clear and moist. No oropharyngeal exudate, posterior oropharyngeal edema or posterior oropharyngeal erythema.   Eyes: Conjunctivae and EOM are normal. Pupils are equal, round, and reactive to light.   Neck: Normal range of motion. Neck supple. No tracheal deviation present. No thyromegaly present.   Cardiovascular: Normal rate, regular rhythm and normal heart sounds.   Pulmonary/Chest: Effort normal and breath sounds normal. No respiratory distress. He has no wheezes. He has no rales. He exhibits no tenderness.   Lymphadenopathy:     He has no cervical adenopathy.   Neurological: He is alert and oriented to person, place, and time. He has normal reflexes.   Skin: Skin is warm and dry.   Psychiatric: He has a normal mood and affect. His behavior is normal. Judgment and thought content normal.   Nursing note and vitals reviewed.      Assessment/Plan   Arash was seen today for uri.    Diagnoses and all orders for this visit:    Acute URI  -     amoxicillin-clavulanate (AUGMENTIN) 875-125 MG per tablet; Take 1 tablet by mouth 2 (Two) Times a Day.  -     MethylPREDNISolone (MEDROL, VICTOR M,) 4 MG tablet; Take as directed on package instructions.    Cough      Treatment as outlined in plan. F/U INB

## 2018-12-30 DIAGNOSIS — F41.9 ANXIETY: ICD-10-CM

## 2018-12-31 RX ORDER — FLUOXETINE 20 MG/1
TABLET, FILM COATED ORAL
Qty: 90 TABLET | Refills: 0 | Status: SHIPPED | OUTPATIENT
Start: 2018-12-31 | End: 2019-05-06 | Stop reason: SDUPTHER

## 2019-01-02 ENCOUNTER — CLINICAL SUPPORT NO REQUIREMENTS (OUTPATIENT)
Dept: CARDIOLOGY | Facility: CLINIC | Age: 59
End: 2019-01-02

## 2019-01-02 DIAGNOSIS — I42.9 CARDIOMYOPATHY, UNSPECIFIED TYPE (HCC): ICD-10-CM

## 2019-01-02 PROCEDURE — 93295 DEV INTERROG REMOTE 1/2/MLT: CPT | Performed by: INTERNAL MEDICINE

## 2019-01-02 PROCEDURE — 93296 REM INTERROG EVL PM/IDS: CPT | Performed by: INTERNAL MEDICINE

## 2019-01-07 ENCOUNTER — TELEPHONE (OUTPATIENT)
Dept: FAMILY MEDICINE CLINIC | Facility: CLINIC | Age: 59
End: 2019-01-07

## 2019-01-07 RX ORDER — DOXYCYCLINE 100 MG/1
100 CAPSULE ORAL EVERY 12 HOURS SCHEDULED
Qty: 20 CAPSULE | Refills: 0 | Status: SHIPPED | OUTPATIENT
Start: 2019-01-07 | End: 2019-05-17

## 2019-01-07 NOTE — TELEPHONE ENCOUNTER
----- Message from Anahi Tang sent at 1/7/2019 12:19 PM EST -----  Contact: KO; PT MED REQBRENDON   PT IS STILL HAVING A COUGH AND CONGESTION HE WANTED TO KNOW IF SOMETHING ELSE COULD BE CALLED IN FOR HIM.       PHARMACY   [facility]

## 2019-01-11 DIAGNOSIS — G62.9 NEUROPATHY: ICD-10-CM

## 2019-01-14 RX ORDER — GABAPENTIN 300 MG/1
CAPSULE ORAL
Qty: 90 CAPSULE | Refills: 0 | OUTPATIENT
Start: 2019-01-14

## 2019-01-18 ENCOUNTER — TELEPHONE (OUTPATIENT)
Dept: FAMILY MEDICINE CLINIC | Facility: CLINIC | Age: 59
End: 2019-01-18

## 2019-01-18 DIAGNOSIS — G62.9 NEUROPATHY: ICD-10-CM

## 2019-01-18 RX ORDER — GABAPENTIN 300 MG/1
300 CAPSULE ORAL 3 TIMES DAILY
Qty: 90 CAPSULE | Refills: 0 | Status: SHIPPED | OUTPATIENT
Start: 2019-01-18 | End: 2019-05-17

## 2019-01-18 NOTE — TELEPHONE ENCOUNTER
Spoke with pt he recently had surgery for his back and his surgeon wanted him to stay on gabapentin tid, however he can not get in touch with Dr Mae to get a refill. CAn you give him a one time refill until he talks to surgeon or can get in to see you to discuss.

## 2019-01-18 NOTE — TELEPHONE ENCOUNTER
----- Message from Anahi Tang sent at 1/18/2019  9:09 AM EST -----  Contact: KO; MEDICATION   PT WOULD LIKE A CALL BACK HE HAS QUESITONS ON HIS MEDICATIONS ABOUT IF HE SHOULD CONTINUE TO TAKE ONE OR NOT.       CALL BACK   [phone]

## 2019-02-01 ENCOUNTER — TELEPHONE (OUTPATIENT)
Dept: FAMILY MEDICINE CLINIC | Facility: CLINIC | Age: 59
End: 2019-02-01

## 2019-02-01 RX ORDER — OSELTAMIVIR PHOSPHATE 75 MG/1
75 CAPSULE ORAL DAILY
Qty: 10 CAPSULE | Refills: 0 | Status: SHIPPED | OUTPATIENT
Start: 2019-02-01 | End: 2019-03-05

## 2019-02-01 NOTE — TELEPHONE ENCOUNTER
----- Message from Anahi Tang sent at 2/1/2019  2:58 PM EST -----  Contact: sadaf; med refquest  Pt grand kids was just dx with the flu they wanted to know if tamaiflu could be called in for them     Pharmacy   [facility] in [place]

## 2019-02-25 RX ORDER — FUROSEMIDE 40 MG/1
TABLET ORAL
Qty: 30 TABLET | Refills: 5 | Status: SHIPPED | OUTPATIENT
Start: 2019-02-25 | End: 2019-10-31 | Stop reason: SDUPTHER

## 2019-03-04 NOTE — PROGRESS NOTES
Subjective:     Encounter Date:03/05/2019    Primary Care Physician: Sundeep Stone MD      Patient ID: Arash Simmons is a 58 y.o. male.    Chief Complaint:Shortness of Breath and Cardiomyopathy    PROBLEM LIST:  1. Nonischemic cardiomyopathy:  a.  On 04/07/2015, abnormal myocardial perfusion study with evidence of dilated cardiomyopathy, ejection fraction of 16%, large fixed perfusion defect in the anterior apex, consistent with prior myocardial infarction.   b. On 05/08/2015, cardiac catheterization  with EF of 10% to 15%.  Normal coronary arteries.  Severe left ventricular dilatation.    c. Echo, 07/08/2015, LVEF 20%.   d. Status post biventricular ICD placement, August 2015.  2.  Left bundle branch block.   3. Hypertension.   4. Dyslipidemia.   5. Anxiety.   6. Cervical spine  7. Questionable sleep apnea.   8. Left knee replacement 2017  9. Abdominal/intestinal surgery as a child.  10. Elbow surgery  11. Tonsillectomy and adenoidectomy  12. Left knee replacement      No Known Allergies      Current Outpatient Medications:   •  aspirin 81 MG tablet, Take 81 mg by mouth Daily., Disp: , Rfl:   •  atorvastatin (LIPITOR) 10 MG tablet, Take 1 tablet by mouth Daily., Disp: 90 tablet, Rfl: 4  •  carvedilol (COREG) 12.5 MG tablet, Take 1 tablet by mouth 2 (Two) Times a Day With Meals., Disp: 180 tablet, Rfl: 4  •  Cholecalciferol (VITAMIN D3) 2000 UNITS capsule, Take 1 capsule by mouth daily., Disp: , Rfl:   •  doxycycline (MONODOX) 100 MG capsule, Take 1 capsule by mouth Every 12 (Twelve) Hours., Disp: 20 capsule, Rfl: 0  •  FLUoxetine (PROzac) 20 MG tablet, TAKE 1 TABLET BY MOUTH ONCE DAILY, Disp: 90 tablet, Rfl: 0  •  furosemide (LASIX) 40 MG tablet, Take 1 tablet by mouth Daily., Disp: 90 tablet, Rfl: 4  •  gabapentin (NEURONTIN) 300 MG capsule, Take 1 capsule by mouth 3 (Three) Times a Day., Disp: 90 capsule, Rfl: 0  •  sacubitril-valsartan (ENTRESTO) 24-26 MG tablet, Take 1 tablet by mouth Every 12 (Twelve)  "Hours., Disp: 180 tablet, Rfl: 4  •  spironolactone (ALDACTONE) 25 MG tablet, Take 1 tablet by mouth Daily., Disp: 90 tablet, Rfl: 4  •  furosemide (LASIX) 40 MG tablet, TAKE ONE TABLET BY MOUTH ONCE DAILY, Disp: 30 tablet, Rfl: 5        History of Present Illness    Patient is a 58-year-old male who presents today for six-month follow-up of nonischemic cardiomyopathy and BiV-ICD.  Since last being seen he notes overall doing well.  He still has chronic shortness of breath which is overall unchanged.  Denies any chest pain, pressure, tightness.  Denies any syncope, near syncope, or worsened edema.  States that he is compliant with his medications.    The following portions of the patient's history were reviewed and updated as appropriate: allergies, current medications, past family history, past medical history, past social history, past surgical history and problem list.      Social History     Tobacco Use   • Smoking status: Former Smoker     Packs/day: 2.00     Years: 30.00     Pack years: 60.00     Last attempt to quit: 1999     Years since quittin.6   • Smokeless tobacco: Never Used   Substance Use Topics   • Alcohol use: Yes     Comment: 2 beers per day   • Drug use: No         Review of Systems   Constitution: Negative.   Cardiovascular: Negative.    Respiratory: Negative.    Hematologic/Lymphatic: Negative for bleeding problem. Does not bruise/bleed easily.   Skin: Negative for rash.   Musculoskeletal: Negative for muscle weakness and myalgias.   Gastrointestinal: Negative for heartburn, nausea and vomiting.   Neurological: Negative.           Objective:    height is 177.8 cm (70\") and weight is 99.8 kg (220 lb). His blood pressure is 108/60 and his pulse is 71. His oxygen saturation is 98%.         Physical Exam   Constitutional: He is oriented to person, place, and time. He appears well-developed and well-nourished. No distress.   Neck: No JVD present. No tracheal deviation present. "   Cardiovascular: Normal rate, regular rhythm, normal heart sounds and intact distal pulses. Exam reveals no friction rub.   No murmur heard.  Pulmonary/Chest: Effort normal and breath sounds normal.   Abdominal: Soft. Bowel sounds are normal. There is no tenderness.   Musculoskeletal: He exhibits no edema or deformity.   Neurological: He is alert and oriented to person, place, and time.   Skin: Skin is warm and dry.       Procedures  Device check:   Functioning device.  99% by ventricularly paced.  No events.  6-year battery life.  No adjustments.        Assessment:   Assessment/Plan      Arash was seen today for shortness of breath and cardiomyopathy.    Diagnoses and all orders for this visit:    Nonischemic cardiomyopathy (CMS/HCC), stable.  Normal functioning device today.    Essential hypertension, controlled.    Dyslipidemia, on statin therapy.      Plan:  1. Continue current medication regimen.  Patient overall stable at this time.  2. Follow-up in 6 months time with a device check or sooner if needed.       TONY Israel   Dictated utilizing Dragon dictation

## 2019-03-05 ENCOUNTER — OFFICE VISIT (OUTPATIENT)
Dept: CARDIOLOGY | Facility: CLINIC | Age: 59
End: 2019-03-05

## 2019-03-05 VITALS
SYSTOLIC BLOOD PRESSURE: 108 MMHG | OXYGEN SATURATION: 98 % | DIASTOLIC BLOOD PRESSURE: 60 MMHG | WEIGHT: 220 LBS | BODY MASS INDEX: 31.5 KG/M2 | HEART RATE: 71 BPM | HEIGHT: 70 IN

## 2019-03-05 DIAGNOSIS — I10 ESSENTIAL HYPERTENSION: ICD-10-CM

## 2019-03-05 DIAGNOSIS — E78.5 DYSLIPIDEMIA: ICD-10-CM

## 2019-03-05 DIAGNOSIS — I42.8 NONISCHEMIC CARDIOMYOPATHY (HCC): Primary | ICD-10-CM

## 2019-03-05 PROCEDURE — 99213 OFFICE O/P EST LOW 20 MIN: CPT | Performed by: NURSE PRACTITIONER

## 2019-03-05 PROCEDURE — 93284 PRGRMG EVAL IMPLANTABLE DFB: CPT | Performed by: NURSE PRACTITIONER

## 2019-03-08 DIAGNOSIS — E78.00 PURE HYPERCHOLESTEROLEMIA: Primary | ICD-10-CM

## 2019-03-08 DIAGNOSIS — E78.00 PURE HYPERCHOLESTEROLEMIA: ICD-10-CM

## 2019-03-08 RX ORDER — ATORVASTATIN CALCIUM 10 MG/1
TABLET, FILM COATED ORAL
Qty: 30 TABLET | Refills: 0 | Status: SHIPPED | OUTPATIENT
Start: 2019-03-08 | End: 2019-05-15 | Stop reason: SDUPTHER

## 2019-03-12 DIAGNOSIS — I10 ESSENTIAL HYPERTENSION: ICD-10-CM

## 2019-03-12 DIAGNOSIS — I50.22 CHRONIC SYSTOLIC CONGESTIVE HEART FAILURE (HCC): ICD-10-CM

## 2019-03-12 RX ORDER — SPIRONOLACTONE 25 MG/1
TABLET ORAL
Qty: 90 TABLET | Refills: 4 | Status: SHIPPED | OUTPATIENT
Start: 2019-03-12 | End: 2019-05-17 | Stop reason: SDUPTHER

## 2019-04-24 ENCOUNTER — CLINICAL SUPPORT NO REQUIREMENTS (OUTPATIENT)
Dept: CARDIOLOGY | Facility: CLINIC | Age: 59
End: 2019-04-24

## 2019-04-24 DIAGNOSIS — I50.22 CHRONIC SYSTOLIC CONGESTIVE HEART FAILURE (HCC): ICD-10-CM

## 2019-04-24 PROCEDURE — 93296 REM INTERROG EVL PM/IDS: CPT | Performed by: INTERNAL MEDICINE

## 2019-04-24 PROCEDURE — 93295 DEV INTERROG REMOTE 1/2/MLT: CPT | Performed by: INTERNAL MEDICINE

## 2019-04-25 ENCOUNTER — TELEPHONE (OUTPATIENT)
Dept: CARDIOLOGY | Facility: CLINIC | Age: 59
End: 2019-04-25

## 2019-04-25 NOTE — TELEPHONE ENCOUNTER
Called pt due to not receiving [employer] home monitor reading.  Left message for pt to return my call.    Reading received.

## 2019-04-29 RX ORDER — SACUBITRIL AND VALSARTAN 24; 26 MG/1; MG/1
TABLET, FILM COATED ORAL
Qty: 60 TABLET | Refills: 6 | Status: SHIPPED | OUTPATIENT
Start: 2019-04-29 | End: 2019-05-17 | Stop reason: SDUPTHER

## 2019-05-06 DIAGNOSIS — F41.9 ANXIETY: ICD-10-CM

## 2019-05-06 RX ORDER — FLUOXETINE 20 MG/1
TABLET, FILM COATED ORAL
Qty: 14 TABLET | Refills: 0 | Status: SHIPPED | OUTPATIENT
Start: 2019-05-06 | End: 2019-05-17 | Stop reason: SDUPTHER

## 2019-05-15 DIAGNOSIS — E78.00 PURE HYPERCHOLESTEROLEMIA: ICD-10-CM

## 2019-05-15 RX ORDER — ATORVASTATIN CALCIUM 10 MG/1
TABLET, FILM COATED ORAL
Qty: 30 TABLET | Refills: 0 | Status: SHIPPED | OUTPATIENT
Start: 2019-05-15 | End: 2019-05-17 | Stop reason: SDUPTHER

## 2019-05-17 ENCOUNTER — OFFICE VISIT (OUTPATIENT)
Dept: FAMILY MEDICINE CLINIC | Facility: CLINIC | Age: 59
End: 2019-05-17

## 2019-05-17 VITALS
SYSTOLIC BLOOD PRESSURE: 118 MMHG | TEMPERATURE: 97.4 F | BODY MASS INDEX: 31.21 KG/M2 | HEIGHT: 70 IN | DIASTOLIC BLOOD PRESSURE: 70 MMHG | RESPIRATION RATE: 16 BRPM | WEIGHT: 218 LBS | HEART RATE: 64 BPM

## 2019-05-17 DIAGNOSIS — E78.00 PURE HYPERCHOLESTEROLEMIA: ICD-10-CM

## 2019-05-17 DIAGNOSIS — F41.9 ANXIETY: ICD-10-CM

## 2019-05-17 DIAGNOSIS — I50.22 CHRONIC SYSTOLIC CONGESTIVE HEART FAILURE (HCC): Primary | ICD-10-CM

## 2019-05-17 DIAGNOSIS — M79.644 CHRONIC PAIN OF RIGHT THUMB: ICD-10-CM

## 2019-05-17 DIAGNOSIS — G62.9 NEUROPATHY: ICD-10-CM

## 2019-05-17 DIAGNOSIS — G89.29 CHRONIC PAIN OF RIGHT THUMB: ICD-10-CM

## 2019-05-17 DIAGNOSIS — I10 ESSENTIAL HYPERTENSION: ICD-10-CM

## 2019-05-17 PROCEDURE — 99214 OFFICE O/P EST MOD 30 MIN: CPT | Performed by: FAMILY MEDICINE

## 2019-05-17 RX ORDER — CARVEDILOL 12.5 MG/1
12.5 TABLET ORAL 2 TIMES DAILY WITH MEALS
Qty: 180 TABLET | Refills: 4 | Status: SHIPPED | OUTPATIENT
Start: 2019-05-17 | End: 2020-02-21 | Stop reason: SDUPTHER

## 2019-05-17 RX ORDER — ATORVASTATIN CALCIUM 10 MG/1
10 TABLET, FILM COATED ORAL NIGHTLY
Qty: 90 TABLET | Refills: 1 | Status: SHIPPED | OUTPATIENT
Start: 2019-05-17 | End: 2019-05-17 | Stop reason: SDUPTHER

## 2019-05-17 RX ORDER — FLUOXETINE 20 MG/1
20 TABLET, FILM COATED ORAL DAILY
Qty: 90 TABLET | Refills: 1 | Status: SHIPPED | OUTPATIENT
Start: 2019-05-17 | End: 2020-02-06

## 2019-05-17 RX ORDER — ATORVASTATIN CALCIUM 10 MG/1
10 TABLET, FILM COATED ORAL NIGHTLY
Qty: 90 TABLET | Refills: 1 | Status: SHIPPED | OUTPATIENT
Start: 2019-05-17 | End: 2020-02-11 | Stop reason: SDUPTHER

## 2019-05-17 RX ORDER — GABAPENTIN 300 MG/1
300 CAPSULE ORAL 3 TIMES DAILY
Qty: 90 CAPSULE | Refills: 5 | Status: SHIPPED | OUTPATIENT
Start: 2019-05-17 | End: 2019-07-30 | Stop reason: SDUPTHER

## 2019-05-17 RX ORDER — SPIRONOLACTONE 25 MG/1
25 TABLET ORAL DAILY
Qty: 90 TABLET | Refills: 1 | Status: SHIPPED | OUTPATIENT
Start: 2019-05-17 | End: 2020-02-21 | Stop reason: SDUPTHER

## 2019-05-17 NOTE — PROGRESS NOTES
Subjective   Arash Simmons is a 58 y.o. male.     History of Present Illness     He had thumb pain  Gabapentin really helped his right thumb when he was taking the gabapentin  He would like to be back on it  300 mg TID    Arash Simmons  is here for follow-up of hypertension of several years duration. He is not exercising and is not adherent to a low-salt diet. Patient does not check his blood pressure.  Patient denies chest pain and palpitations. He is compliant with meds.        Arash Simmons returns today for follow up of Hyperlipidemia  Arash indicates his exercise level as not at all.  Diet: not really watching what he eats  Patient is compliant with medications   Any side effects to medications:   chest pain No myalgia No memory change No  Pt is due for labs        The following portions of the patient's history were reviewed and updated as appropriate: allergies, current medications, past family history, past medical history, past social history, past surgical history and problem list.    Review of Systems   Constitutional: Negative.  Negative for unexpected weight change.   HENT: Negative.    Eyes: Negative.    Respiratory: Negative.  Negative for shortness of breath.    Cardiovascular: Negative.  Negative for chest pain.   Gastrointestinal: Negative.  Negative for constipation and diarrhea.   Musculoskeletal: Negative.    Skin: Negative.    Neurological: Negative.    Psychiatric/Behavioral: Negative.    All other systems reviewed and are negative.      Objective   Physical Exam   Constitutional: He is oriented to person, place, and time. He appears well-developed and well-nourished. No distress.   Cardiovascular: Normal rate, regular rhythm and normal heart sounds.   Pulmonary/Chest: Effort normal and breath sounds normal.   Abdominal: Soft. Bowel sounds are normal. He exhibits no distension. There is no tenderness.   Neurological: He is alert and oriented to person, place, and time.    Psychiatric: He has a normal mood and affect. His behavior is normal. Judgment and thought content normal.   Nursing note and vitals reviewed.      Assessment/Plan   Arash was seen today for follow-up and hyperlipidemia.    Diagnoses and all orders for this visit:    Chronic systolic congestive heart failure (CMS/HCC)  -     carvedilol (COREG) 12.5 MG tablet; Take 1 tablet by mouth 2 (Two) Times a Day With Meals.  -     spironolactone (ALDACTONE) 25 MG tablet; Take 1 tablet by mouth Daily.  -     sacubitril-valsartan (ENTRESTO) 24-26 MG tablet; Take 1 tablet by mouth Every 12 (Twelve) Hours.    Neuropathy  -     gabapentin (NEURONTIN) 300 MG capsule; Take 1 capsule by mouth 3 (Three) Times a Day.    Essential hypertension  -     carvedilol (COREG) 12.5 MG tablet; Take 1 tablet by mouth 2 (Two) Times a Day With Meals.  -     spironolactone (ALDACTONE) 25 MG tablet; Take 1 tablet by mouth Daily.  -     CBC & Differential  -     Comprehensive Metabolic Panel    Pure hypercholesterolemia  -     Discontinue: atorvastatin (LIPITOR) 10 MG tablet; Take 1 tablet by mouth Every Night.  -     Comprehensive Metabolic Panel  -     Lipid Panel  -     atorvastatin (LIPITOR) 10 MG tablet; Take 1 tablet by mouth Every Night.    Anxiety  -     FLUoxetine (PROzac) 20 MG tablet; Take 1 tablet by mouth Daily.    Chronic pain of right thumb  -     gabapentin (NEURONTIN) 300 MG capsule; Take 1 capsule by mouth 3 (Three) Times a Day.    CHF has been stable, continue medicine and no changes at this time.  BP looking good as well  Will recheck lipids and continue statin  He really felt like the gabapentin helped his right thumb pain, will continue this and script given today

## 2019-05-21 LAB
ALBUMIN SERPL-MCNC: 4.3 G/DL (ref 3.5–5.2)
ALBUMIN/GLOB SERPL: 1.5 G/DL
ALP SERPL-CCNC: 73 U/L (ref 39–117)
ALT SERPL-CCNC: 14 U/L (ref 1–41)
AST SERPL-CCNC: 18 U/L (ref 1–40)
BASOPHILS # BLD AUTO: 0.06 10*3/MM3 (ref 0–0.2)
BASOPHILS NFR BLD AUTO: 0.8 % (ref 0–1.5)
BILIRUB SERPL-MCNC: 0.7 MG/DL (ref 0.2–1.2)
BUN SERPL-MCNC: 15 MG/DL (ref 6–20)
BUN/CREAT SERPL: 15.3 (ref 7–25)
CALCIUM SERPL-MCNC: 9.8 MG/DL (ref 8.6–10.5)
CHLORIDE SERPL-SCNC: 100 MMOL/L (ref 98–107)
CHOLEST SERPL-MCNC: 171 MG/DL (ref 0–200)
CO2 SERPL-SCNC: 27 MMOL/L (ref 22–29)
CREAT SERPL-MCNC: 0.98 MG/DL (ref 0.76–1.27)
EOSINOPHIL # BLD AUTO: 0.21 10*3/MM3 (ref 0–0.4)
EOSINOPHIL NFR BLD AUTO: 2.7 % (ref 0.3–6.2)
ERYTHROCYTE [DISTWIDTH] IN BLOOD BY AUTOMATED COUNT: 12.9 % (ref 12.3–15.4)
GLOBULIN SER CALC-MCNC: 2.8 GM/DL
GLUCOSE SERPL-MCNC: 109 MG/DL (ref 65–99)
HCT VFR BLD AUTO: 43.9 % (ref 37.5–51)
HDLC SERPL-MCNC: 48 MG/DL (ref 40–60)
HGB BLD-MCNC: 13.9 G/DL (ref 13–17.7)
IMM GRANULOCYTES # BLD AUTO: 0.03 10*3/MM3 (ref 0–0.05)
IMM GRANULOCYTES NFR BLD AUTO: 0.4 % (ref 0–0.5)
LDLC SERPL CALC-MCNC: 102 MG/DL (ref 0–100)
LYMPHOCYTES # BLD AUTO: 2.06 10*3/MM3 (ref 0.7–3.1)
LYMPHOCYTES NFR BLD AUTO: 26.2 % (ref 19.6–45.3)
MCH RBC QN AUTO: 30.3 PG (ref 26.6–33)
MCHC RBC AUTO-ENTMCNC: 31.7 G/DL (ref 31.5–35.7)
MCV RBC AUTO: 95.9 FL (ref 79–97)
MONOCYTES # BLD AUTO: 0.76 10*3/MM3 (ref 0.1–0.9)
MONOCYTES NFR BLD AUTO: 9.7 % (ref 5–12)
NEUTROPHILS # BLD AUTO: 4.74 10*3/MM3 (ref 1.7–7)
NEUTROPHILS NFR BLD AUTO: 60.2 % (ref 42.7–76)
NRBC BLD AUTO-RTO: 0 /100 WBC (ref 0–0.2)
PLATELET # BLD AUTO: 258 10*3/MM3 (ref 140–450)
POTASSIUM SERPL-SCNC: 4.5 MMOL/L (ref 3.5–5.2)
PROT SERPL-MCNC: 7.1 G/DL (ref 6–8.5)
RBC # BLD AUTO: 4.58 10*6/MM3 (ref 4.14–5.8)
SODIUM SERPL-SCNC: 137 MMOL/L (ref 136–145)
TRIGL SERPL-MCNC: 107 MG/DL (ref 0–150)
VLDLC SERPL CALC-MCNC: 21.4 MG/DL
WBC # BLD AUTO: 7.86 10*3/MM3 (ref 3.4–10.8)

## 2019-07-30 DIAGNOSIS — G62.9 NEUROPATHY: ICD-10-CM

## 2019-07-30 DIAGNOSIS — G89.29 CHRONIC PAIN OF RIGHT THUMB: ICD-10-CM

## 2019-07-30 DIAGNOSIS — M79.644 CHRONIC PAIN OF RIGHT THUMB: ICD-10-CM

## 2019-07-31 ENCOUNTER — CLINICAL SUPPORT NO REQUIREMENTS (OUTPATIENT)
Dept: CARDIOLOGY | Facility: CLINIC | Age: 59
End: 2019-07-31

## 2019-07-31 DIAGNOSIS — I42.9 CARDIOMYOPATHY, UNSPECIFIED TYPE (HCC): ICD-10-CM

## 2019-07-31 PROCEDURE — 93295 DEV INTERROG REMOTE 1/2/MLT: CPT | Performed by: INTERNAL MEDICINE

## 2019-07-31 PROCEDURE — 93296 REM INTERROG EVL PM/IDS: CPT | Performed by: INTERNAL MEDICINE

## 2019-08-14 RX ORDER — GABAPENTIN 300 MG/1
CAPSULE ORAL
Qty: 90 CAPSULE | Refills: 0 | Status: SHIPPED | OUTPATIENT
Start: 2019-08-14 | End: 2020-02-21

## 2019-09-18 ENCOUNTER — OFFICE VISIT (OUTPATIENT)
Dept: CARDIOLOGY | Facility: CLINIC | Age: 59
End: 2019-09-18

## 2019-09-18 VITALS
SYSTOLIC BLOOD PRESSURE: 128 MMHG | HEART RATE: 79 BPM | OXYGEN SATURATION: 98 % | BODY MASS INDEX: 31.35 KG/M2 | HEIGHT: 70 IN | DIASTOLIC BLOOD PRESSURE: 68 MMHG | WEIGHT: 219 LBS

## 2019-09-18 DIAGNOSIS — I50.22 CHRONIC SYSTOLIC CONGESTIVE HEART FAILURE (HCC): Primary | ICD-10-CM

## 2019-09-18 DIAGNOSIS — I42.9 CARDIOMYOPATHY, UNSPECIFIED TYPE (HCC): ICD-10-CM

## 2019-09-18 DIAGNOSIS — E78.00 PURE HYPERCHOLESTEROLEMIA: ICD-10-CM

## 2019-09-18 DIAGNOSIS — I10 ESSENTIAL HYPERTENSION: ICD-10-CM

## 2019-09-18 PROCEDURE — 99214 OFFICE O/P EST MOD 30 MIN: CPT | Performed by: INTERNAL MEDICINE

## 2019-09-18 PROCEDURE — 93284 PRGRMG EVAL IMPLANTABLE DFB: CPT | Performed by: INTERNAL MEDICINE

## 2019-09-18 NOTE — PROGRESS NOTES
"Subjective:     Encounter Date:09/18/2019      Patient ID: Arash Simmons is a 59 y.o. male.    Chief Complaint: Follow-up      PROBLEM LIST:  1. Nonischemic cardiomyopathy:  a.  On 04/07/2015, abnormal myocardial perfusion study with evidence of dilated cardiomyopathy, ejection fraction of 16%, large fixed perfusion defect in the anterior apex, consistent with prior myocardial infarction.   b. On 05/08/2015, cardiac catheterization  with EF of 10% to 15%.  Normal coronary arteries.  Severe left ventricular dilatation.    c. Echo, 07/08/2015, LVEF 20%.   d. Status post biventricular ICD placement, August 2015.  2.  Left bundle branch block.   3. Hypertension.   4. Dyslipidemia.   5. Anxiety.   6. Cervical spine  7. Questionable sleep apnea.   8. Left knee replacement 2017  9. Abdominal/intestinal surgery as a child.  10. Elbow surgery  11. Tonsillectomy and adenoidectomy  12. Left knee replacement    History of Present Illness  Patient returns today for follow up with a history of hemic nonischemic cardia myopathy for evaluation and IV ICD check.  Since her last visit he should continue to clinically do well.  He continues to work full-time.  Denies orthopnea PND chest pain or dyspnea on exertion.  There is no change in his functional capacity.  He has noted her last 1 month however his left lower extremity only has been swollen and somewhat tender around the ankle.  He notes he has a previous history of venous insufficiency and was a one-point scheduled for \"stripping\", but never got around to it..     No Known Allergies      Current Outpatient Medications:   •  aspirin 81 MG tablet, Take 81 mg by mouth Daily., Disp: , Rfl:   •  atorvastatin (LIPITOR) 10 MG tablet, Take 1 tablet by mouth Every Night., Disp: 90 tablet, Rfl: 1  •  carvedilol (COREG) 12.5 MG tablet, Take 1 tablet by mouth 2 (Two) Times a Day With Meals., Disp: 180 tablet, Rfl: 4  •  Cholecalciferol (VITAMIN D3) 2000 UNITS capsule, Take 1 capsule by " "mouth daily., Disp: , Rfl:   •  FLUoxetine (PROzac) 20 MG tablet, Take 1 tablet by mouth Daily., Disp: 90 tablet, Rfl: 1  •  furosemide (LASIX) 40 MG tablet, Take 1 tablet by mouth Daily., Disp: 90 tablet, Rfl: 4  •  furosemide (LASIX) 40 MG tablet, TAKE ONE TABLET BY MOUTH ONCE DAILY, Disp: 30 tablet, Rfl: 5  •  gabapentin (NEURONTIN) 300 MG capsule, TAKE 1 CAPSULE BY MOUTH THREE TIMES DAILY, Disp: 90 capsule, Rfl: 0  •  sacubitril-valsartan (ENTRESTO) 24-26 MG tablet, Take 1 tablet by mouth Every 12 (Twelve) Hours., Disp: 180 tablet, Rfl: 1  •  spironolactone (ALDACTONE) 25 MG tablet, Take 1 tablet by mouth Daily., Disp: 90 tablet, Rfl: 1    The following portions of the patient's history were reviewed and updated as appropriate: allergies, current medications, past family history, past medical history, past social history, past surgical history and problem list.    Review of Systems   Constitution: Negative.   Cardiovascular: Positive for leg swelling.   Respiratory: Positive for snoring.    Hematologic/Lymphatic: Negative for bleeding problem. Does not bruise/bleed easily.   Skin: Negative for rash.   Musculoskeletal: Negative for muscle weakness and myalgias.   Gastrointestinal: Negative for heartburn, nausea and vomiting.   Neurological: Negative.           Objective:   Blood pressure 128/68, pulse 79, height 177.8 cm (70\"), weight 99.3 kg (219 lb), SpO2 98 %.      Physical Exam   Constitutional: He is oriented to person, place, and time. He appears well-developed and well-nourished.   HENT:   Mouth/Throat: Oropharynx is clear and moist.   Neck: No JVD present. Carotid bruit is not present. No thyromegaly present.   Cardiovascular: Regular rhythm, S1 normal, S2 normal, normal heart sounds and intact distal pulses. Exam reveals no gallop, no S3 and no S4.   No murmur heard.  Pulses:       Carotid pulses are 2+ on the right side, and 2+ on the left side.       Radial pulses are 2+ on the right side, and 2+ on " the left side.   Pulmonary/Chest: Breath sounds normal.   Abdominal: Soft. Bowel sounds are normal. He exhibits no mass. There is no tenderness.   Musculoskeletal: He exhibits edema (2+ left lower extremity edema only, to the mid calf.  Bilateral, left greater than right, venous varicosities).   Neurological: He is alert and oriented to person, place, and time.   Skin: Skin is warm and dry. No rash noted.       Lab Review:    Procedures  By V ICD check: Normal function.  No arrhythmias detected.  5 and half years of battery life left.  Normal thresholds and impedances.  Greater than 99% biventricular paced      Assessment:   Arash was seen today for follow-up.    Diagnoses and all orders for this visit:    Chronic systolic congestive heart failure (CMS/HCC)  -     Adult Transthoracic Echo Complete W/ Cont if Necessary Per Protocol; Future    Cardiomyopathy, unspecified type (CMS/HCC)  -     Adult Transthoracic Echo Complete W/ Cont if Necessary Per Protocol; Future    Essential hypertension    Pure hypercholesterolemia        Impression  1. Nonischemic cardiomyopathy no evaluation of LV EF in 3 years.  Currently functional class I post by V ICD.  2. Normal functioning by V ICD  3. Left lower extremity edema, suspect due to venous insufficiency.  No evidence of heart failure.  4. Hypertension well-controlled  5. Dyslipidemia controlled on low-dose statin (no coronary artery disease)    Plan:  1. Continue continue current medical therapy of nonischemic cardia myopathy  2. Discussed elevation, lower extremity wraps, and support stockings as conservative therapy for his venous insufficiency.  If he fails this, he will call our office to consider venous ablative strategies.  3. Echocardiogram to evaluate LVEF  4. Revisit in 6 MO, or sooner as needed.    Pb Naqvi MD

## 2019-10-31 ENCOUNTER — CLINICAL SUPPORT NO REQUIREMENTS (OUTPATIENT)
Dept: CARDIOLOGY | Facility: CLINIC | Age: 59
End: 2019-10-31

## 2019-10-31 DIAGNOSIS — I50.22 CHRONIC SYSTOLIC CONGESTIVE HEART FAILURE (HCC): ICD-10-CM

## 2019-10-31 PROCEDURE — 93295 DEV INTERROG REMOTE 1/2/MLT: CPT | Performed by: INTERNAL MEDICINE

## 2019-10-31 PROCEDURE — 93296 REM INTERROG EVL PM/IDS: CPT | Performed by: INTERNAL MEDICINE

## 2019-10-31 RX ORDER — FUROSEMIDE 40 MG/1
TABLET ORAL
Qty: 30 TABLET | Refills: 3 | Status: SHIPPED | OUTPATIENT
Start: 2019-10-31 | End: 2020-02-21 | Stop reason: SDUPTHER

## 2020-01-21 ENCOUNTER — TELEPHONE (OUTPATIENT)
Dept: FAMILY MEDICINE CLINIC | Facility: CLINIC | Age: 60
End: 2020-01-21

## 2020-01-21 NOTE — TELEPHONE ENCOUNTER
Pt wants to know if we have samples of Entresto, as he has been out for a week, and his employer has messed up his insurace. Please advise and call patient if you have samples

## 2020-01-24 ENCOUNTER — TELEPHONE (OUTPATIENT)
Dept: FAMILY MEDICINE CLINIC | Facility: CLINIC | Age: 60
End: 2020-01-24

## 2020-01-24 NOTE — TELEPHONE ENCOUNTER
Patient called and is requesting something be called in for body aches from coughing and congestion in head and chest if possible. Patient does have appt Tuesday, but would like to start treating sooner.    Pt call back  [phone]    [facility] [place] confirmed

## 2020-01-27 RX ORDER — IBUPROFEN 800 MG/1
800 TABLET ORAL EVERY 8 HOURS PRN
Qty: 30 TABLET | Refills: 1 | Status: SHIPPED | OUTPATIENT
Start: 2020-01-27 | End: 2020-10-12

## 2020-02-06 ENCOUNTER — CLINICAL SUPPORT NO REQUIREMENTS (OUTPATIENT)
Dept: CARDIOLOGY | Facility: CLINIC | Age: 60
End: 2020-02-06

## 2020-02-06 DIAGNOSIS — F41.9 ANXIETY: ICD-10-CM

## 2020-02-06 DIAGNOSIS — I44.7 LBBB (LEFT BUNDLE BRANCH BLOCK): ICD-10-CM

## 2020-02-06 PROCEDURE — 93296 REM INTERROG EVL PM/IDS: CPT | Performed by: INTERNAL MEDICINE

## 2020-02-06 PROCEDURE — 93295 DEV INTERROG REMOTE 1/2/MLT: CPT | Performed by: INTERNAL MEDICINE

## 2020-02-06 RX ORDER — FLUOXETINE 20 MG/1
TABLET, FILM COATED ORAL
Qty: 90 TABLET | Refills: 0 | Status: SHIPPED | OUTPATIENT
Start: 2020-02-06 | End: 2020-02-21 | Stop reason: SDUPTHER

## 2020-02-11 ENCOUNTER — TELEPHONE (OUTPATIENT)
Dept: FAMILY MEDICINE CLINIC | Facility: CLINIC | Age: 60
End: 2020-02-11

## 2020-02-11 DIAGNOSIS — E78.00 PURE HYPERCHOLESTEROLEMIA: ICD-10-CM

## 2020-02-11 RX ORDER — ATORVASTATIN CALCIUM 10 MG/1
10 TABLET, FILM COATED ORAL NIGHTLY
Qty: 14 TABLET | Refills: 0 | Status: SHIPPED | OUTPATIENT
Start: 2020-02-11 | End: 2020-02-21 | Stop reason: SDUPTHER

## 2020-02-21 ENCOUNTER — OFFICE VISIT (OUTPATIENT)
Dept: FAMILY MEDICINE CLINIC | Facility: CLINIC | Age: 60
End: 2020-02-21

## 2020-02-21 VITALS
HEART RATE: 72 BPM | RESPIRATION RATE: 18 BRPM | DIASTOLIC BLOOD PRESSURE: 78 MMHG | WEIGHT: 222 LBS | HEIGHT: 70 IN | TEMPERATURE: 97.8 F | BODY MASS INDEX: 31.78 KG/M2 | SYSTOLIC BLOOD PRESSURE: 112 MMHG

## 2020-02-21 DIAGNOSIS — I50.22 CHRONIC SYSTOLIC CONGESTIVE HEART FAILURE (HCC): Primary | ICD-10-CM

## 2020-02-21 DIAGNOSIS — Z12.5 SCREENING FOR PROSTATE CANCER: ICD-10-CM

## 2020-02-21 DIAGNOSIS — I10 ESSENTIAL HYPERTENSION: ICD-10-CM

## 2020-02-21 DIAGNOSIS — E78.00 PURE HYPERCHOLESTEROLEMIA: ICD-10-CM

## 2020-02-21 DIAGNOSIS — F41.9 ANXIETY: ICD-10-CM

## 2020-02-21 PROBLEM — D72.829 LEUKOCYTOSIS: Status: RESOLVED | Noted: 2018-09-20 | Resolved: 2020-02-21

## 2020-02-21 PROCEDURE — 99214 OFFICE O/P EST MOD 30 MIN: CPT | Performed by: FAMILY MEDICINE

## 2020-02-21 RX ORDER — SPIRONOLACTONE 25 MG/1
25 TABLET ORAL DAILY
Qty: 90 TABLET | Refills: 1 | Status: SHIPPED | OUTPATIENT
Start: 2020-02-21 | End: 2021-03-12 | Stop reason: SDUPTHER

## 2020-02-21 RX ORDER — FUROSEMIDE 40 MG/1
40 TABLET ORAL DAILY
Qty: 90 TABLET | Refills: 1 | Status: SHIPPED | OUTPATIENT
Start: 2020-02-21 | End: 2020-02-25 | Stop reason: SDUPTHER

## 2020-02-21 RX ORDER — CARVEDILOL 12.5 MG/1
12.5 TABLET ORAL 2 TIMES DAILY WITH MEALS
Qty: 180 TABLET | Refills: 4 | Status: SHIPPED | OUTPATIENT
Start: 2020-02-21 | End: 2020-03-03 | Stop reason: SDUPTHER

## 2020-02-21 RX ORDER — FLUOXETINE 20 MG/1
20 TABLET, FILM COATED ORAL DAILY
Qty: 90 TABLET | Refills: 1 | Status: SHIPPED | OUTPATIENT
Start: 2020-02-21 | End: 2021-03-12 | Stop reason: SDUPTHER

## 2020-02-21 RX ORDER — ATORVASTATIN CALCIUM 10 MG/1
10 TABLET, FILM COATED ORAL NIGHTLY
Qty: 90 TABLET | Refills: 1 | Status: SHIPPED | OUTPATIENT
Start: 2020-02-21 | End: 2021-03-03 | Stop reason: SDUPTHER

## 2020-02-21 NOTE — PROGRESS NOTES
Subjective   Arash Simmons is a 59 y.o. male.     History of Present Illness     CHF has been stable, no SOA and no worsening of edema  He is on coreg and entresto  No issues with BP either  He has been happy with medicine       Arash Simmons returns today for follow up of Hyperlipidemia  Arash indicates his exercise level as not at all.  Diet: he is eating pretty healthy  Patient is compliant with medications   Any side effects to medications:   chest pain No myalgia No memory change No  Pt is due for labs    His mood is doing well with the prozac  He is happy with it  No SE and he simply needs refills    The following portions of the patient's history were reviewed and updated as appropriate: allergies, current medications, past family history, past medical history, past social history, past surgical history and problem list.    Review of Systems   Constitutional: Negative.    HENT: Negative.    Eyes: Negative.    Respiratory: Negative.  Negative for shortness of breath.    Cardiovascular: Negative.  Negative for chest pain.   Gastrointestinal: Negative.  Negative for constipation and diarrhea.   Musculoskeletal: Negative.    Skin: Negative.    Neurological: Negative.    Psychiatric/Behavioral: Negative.  Negative for dysphoric mood. The patient is not nervous/anxious.    All other systems reviewed and are negative.      Objective   Physical Exam   Constitutional: He is oriented to person, place, and time. He appears well-developed and well-nourished. No distress.   Cardiovascular: Normal rate, regular rhythm and normal heart sounds.   Pulmonary/Chest: Effort normal and breath sounds normal.   Neurological: He is alert and oriented to person, place, and time.   Psychiatric: He has a normal mood and affect. His behavior is normal. Judgment and thought content normal.   Nursing note and vitals reviewed.      Assessment/Plan   Arash was seen today for hypertension and anxiety.    Diagnoses and all orders  for this visit:    Chronic systolic congestive heart failure (CMS/HCC)  -     sacubitril-valsartan (ENTRESTO) 24-26 MG tablet; Take 1 tablet by mouth Every 12 (Twelve) Hours.  -     carvedilol (COREG) 12.5 MG tablet; Take 1 tablet by mouth 2 (Two) Times a Day With Meals.  -     spironolactone (ALDACTONE) 25 MG tablet; Take 1 tablet by mouth Daily.    Essential hypertension  -     carvedilol (COREG) 12.5 MG tablet; Take 1 tablet by mouth 2 (Two) Times a Day With Meals.  -     spironolactone (ALDACTONE) 25 MG tablet; Take 1 tablet by mouth Daily.  -     CBC & Differential  -     Comprehensive Metabolic Panel    Pure hypercholesterolemia  -     atorvastatin (LIPITOR) 10 MG tablet; Take 1 tablet by mouth Every Night.  -     Comprehensive Metabolic Panel  -     Lipid Panel    Anxiety  -     FLUoxetine (PROzac) 20 MG tablet; Take 1 tablet by mouth Daily.    Screening for prostate cancer  -     PSA Screen    Other orders  -     furosemide (LASIX) 40 MG tablet; Take 1 tablet by mouth Daily.    overall pt doing well, will continue chronic meds for CHF and BP and rechekc labs  Recheck lipids and continue statin  Ok for prozac for mood, this is working well  Check PSA today

## 2020-02-22 LAB
ALBUMIN SERPL-MCNC: 4.2 G/DL (ref 3.5–5.2)
ALBUMIN/GLOB SERPL: 1.6 G/DL
ALP SERPL-CCNC: 75 U/L (ref 39–117)
ALT SERPL-CCNC: 12 U/L (ref 1–41)
AST SERPL-CCNC: 14 U/L (ref 1–40)
BASOPHILS # BLD AUTO: 0.05 10*3/MM3 (ref 0–0.2)
BASOPHILS NFR BLD AUTO: 0.8 % (ref 0–1.5)
BILIRUB SERPL-MCNC: 0.9 MG/DL (ref 0.2–1.2)
BUN SERPL-MCNC: 14 MG/DL (ref 6–20)
BUN/CREAT SERPL: 15.2 (ref 7–25)
CALCIUM SERPL-MCNC: 8.9 MG/DL (ref 8.6–10.5)
CHLORIDE SERPL-SCNC: 102 MMOL/L (ref 98–107)
CHOLEST SERPL-MCNC: 151 MG/DL (ref 0–200)
CO2 SERPL-SCNC: 26.4 MMOL/L (ref 22–29)
CREAT SERPL-MCNC: 0.92 MG/DL (ref 0.76–1.27)
EOSINOPHIL # BLD AUTO: 0.27 10*3/MM3 (ref 0–0.4)
EOSINOPHIL NFR BLD AUTO: 4.3 % (ref 0.3–6.2)
ERYTHROCYTE [DISTWIDTH] IN BLOOD BY AUTOMATED COUNT: 12.5 % (ref 12.3–15.4)
GLOBULIN SER CALC-MCNC: 2.6 GM/DL
GLUCOSE SERPL-MCNC: 97 MG/DL (ref 65–99)
HCT VFR BLD AUTO: 40 % (ref 37.5–51)
HDLC SERPL-MCNC: 36 MG/DL (ref 40–60)
HGB BLD-MCNC: 13.3 G/DL (ref 13–17.7)
IMM GRANULOCYTES # BLD AUTO: 0.04 10*3/MM3 (ref 0–0.05)
IMM GRANULOCYTES NFR BLD AUTO: 0.6 % (ref 0–0.5)
LDLC SERPL CALC-MCNC: 94 MG/DL (ref 0–100)
LYMPHOCYTES # BLD AUTO: 1.58 10*3/MM3 (ref 0.7–3.1)
LYMPHOCYTES NFR BLD AUTO: 25.3 % (ref 19.6–45.3)
MCH RBC QN AUTO: 30.4 PG (ref 26.6–33)
MCHC RBC AUTO-ENTMCNC: 33.3 G/DL (ref 31.5–35.7)
MCV RBC AUTO: 91.3 FL (ref 79–97)
MONOCYTES # BLD AUTO: 0.63 10*3/MM3 (ref 0.1–0.9)
MONOCYTES NFR BLD AUTO: 10.1 % (ref 5–12)
NEUTROPHILS # BLD AUTO: 3.67 10*3/MM3 (ref 1.7–7)
NEUTROPHILS NFR BLD AUTO: 58.9 % (ref 42.7–76)
NRBC BLD AUTO-RTO: 0 /100 WBC (ref 0–0.2)
PLATELET # BLD AUTO: 268 10*3/MM3 (ref 140–450)
POTASSIUM SERPL-SCNC: 4.4 MMOL/L (ref 3.5–5.2)
PROT SERPL-MCNC: 6.8 G/DL (ref 6–8.5)
PSA SERPL-MCNC: 0.78 NG/ML (ref 0–4)
RBC # BLD AUTO: 4.38 10*6/MM3 (ref 4.14–5.8)
SODIUM SERPL-SCNC: 137 MMOL/L (ref 136–145)
TRIGL SERPL-MCNC: 107 MG/DL (ref 0–150)
VLDLC SERPL CALC-MCNC: 21.4 MG/DL
WBC # BLD AUTO: 6.24 10*3/MM3 (ref 3.4–10.8)

## 2020-02-25 ENCOUNTER — TELEPHONE (OUTPATIENT)
Dept: FAMILY MEDICINE CLINIC | Facility: CLINIC | Age: 60
End: 2020-02-25

## 2020-02-25 RX ORDER — FUROSEMIDE 40 MG/1
40 TABLET ORAL DAILY
Qty: 30 TABLET | Refills: 5 | Status: SHIPPED | OUTPATIENT
Start: 2020-02-25 | End: 2021-04-15

## 2020-02-25 NOTE — TELEPHONE ENCOUNTER
PATIENT IS REQUESTING furosemide (LASIX) 40 MG tablet BE CHANGED TO A 30 DAY SUPPLY FROM A 90 DAY SUPPLY. PATIENT STATED HE WAS INFORMED BY HIS INSURANCE WILL NOT COVER A 90 DAY SUPPLY , THEY WILL ONLY COVER A 30 DAY SUPPLY .         [facility] [address] [place] [address] - [facility] [phone] [facility] [phone] FX     PLEASE CALL AND ADVISE PATIENT [phone]

## 2020-03-03 ENCOUNTER — TELEPHONE (OUTPATIENT)
Dept: FAMILY MEDICINE CLINIC | Facility: CLINIC | Age: 60
End: 2020-03-03

## 2020-03-03 DIAGNOSIS — I10 ESSENTIAL HYPERTENSION: ICD-10-CM

## 2020-03-03 DIAGNOSIS — I50.22 CHRONIC SYSTOLIC CONGESTIVE HEART FAILURE (HCC): ICD-10-CM

## 2020-03-03 RX ORDER — CARVEDILOL 12.5 MG/1
12.5 TABLET ORAL 2 TIMES DAILY WITH MEALS
Qty: 60 TABLET | Refills: 5 | Status: SHIPPED | OUTPATIENT
Start: 2020-03-03 | End: 2021-03-12 | Stop reason: SDUPTHER

## 2020-03-03 NOTE — TELEPHONE ENCOUNTER
PATIENT STATES THAT THE INSURANCE SENT HIM A LETTER AND WILL NOT PAY FOR THE 90 DAY SUPPLY OF HIS MEDICATIONS. PLEASE CHANGE PRESCRIPTION TO 30 DAY SUPPLY FOR     carvedilol (COREG) 12.5 MG tabletsacubitril-valsartan (ENTRESTO) 24-26 MG tablet     PATIENT CALL BACK [phone]     [facility] [place]

## 2020-03-23 ENCOUNTER — APPOINTMENT (OUTPATIENT)
Dept: CARDIOLOGY | Facility: HOSPITAL | Age: 60
End: 2020-03-23

## 2020-03-26 ENCOUNTER — NURSE TRIAGE (OUTPATIENT)
Dept: CALL CENTER | Facility: HOSPITAL | Age: 60
End: 2020-03-26

## 2020-03-26 NOTE — TELEPHONE ENCOUNTER
Reason for Disposition  • [1] No COVID-19 EXPOSURE BUT [2] questions about    Additional Information  • Negative: Severe difficulty breathing (e.g., struggling for each breath, speak in single words, bluish lips)  • Negative: Sounds like a life-threatening emergency to the triager  • Negative: [1] Difficulty breathing (shortness of breath) occurs AND [2] onset > 14 days after COVID-19 EXPOSURE (Close Contact)  • Negative: [1] Dry cough occurs AND [2] onset > 14 days after COVID-19 EXPOSURE  • Negative: [1] Wet cough (i.e., white-yellow, yellow, green, or niya colored sputum) AND [2] onset > 14 days after COVID-19 EXPOSURE  • Negative: [1] Common cold symptoms AND [2] onset > 14 days after COVID-19 EXPOSURE  • Negative: [1] Difficulty breathing occurs AND [2] within 14 days of COVID-19 EXPOSURE (Close Contact)  • Negative: Patient sounds very sick or weak to the triager  • Negative: [1] Fever or feeling feverish AND [2] within 14 Days of COVID-19 EXPOSURE (Close Contact)  • Negative: [1] Cough occurs AND [2] within 14 days of COVID-19 EXPOSURE  • Negative: [1] Fever (or feeling feverish) OR cough occurs AND [2] travel from or living in high risk area (identified by CDC) AND [3] within last 14 days  • Negative: [1] COVID-19 EXPOSURE within last 14 days AND [2] mild body aches, chills, diarrhea, headache, runny nose, or sore throat occur  • Negative: [1] COVID-19 EXPOSURE within last 14 days AND [2] NO cough, fever, or breathing difficulty AND [3] 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 recommended personal protective equipment (i.e., a respirator-N95 mask, eye protection, gloves, and gown)  • Negative: [1] COVID-19 EXPOSURE (Close Contact) within last 14 days AND [2] NO cough, fever, or breathing difficulty  • Negative: [1] Travel from or living in high risk area (identified by CDC) AND [2] within last 14 days AND [3] NO cough or fever or breathing difficulty  • Negative: [1] COVID-19 EXPOSURE  "(Close Contact) AND [2] 15 or more days ago AND [3] NO cough or fever or breathing difficulty  • Negative: [1] No COVID-19 EXPOSURE BUT [2] living with someone who was exposed and who has no fever or cough  • Negative: [1] Caller concerned that exposure to COVID-19 occurred BUT [2] does not meet COVID-19 EXPOSURE criteria from CDC  • Negative: COVID-19 testing, questions about who needs it    Answer Assessment - Initial Assessment Questions  1. CONFIRMED CASE: \"Who is the person with the confirmed COVID-19 infection that you were exposed to?\"      Pt states he has not been exposed to anyone with confirmed COVID-19 infection      2. PLACE of CONTACT: \"Where were you when you were exposed to COVID-19  (coronavirus disease 2019)?\" (e.g., city, state, country)      Pt works outside the home. 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 Follows 6 foot rule at [employer].    3. TYPE of CONTACT: \"How much contact was there?\" (e.g., live in same house, work in same office, same school)      See above dates and times.    4. DATE of CONTACT: \"When did you have contact with a coronavirus patient?\" (e.g., days)      Unsure    5. DURATION of CONTACT: \"How long were you in contact with the COVID-19 (coronavirus disease) patient?\" (e.g., a few seconds, passed by person, a few minutes, live with the patient)      Unsure    6. SYMPTOMS: \"Do you have any symptoms?\" (e.g., fever, cough, breathing difficulty)      Cough--states he is having a cough. Feels like it is white or clear.  Nasal passages--feels stuffed up.  Fever--no fever no body aches.   SOA--None other than when he is coughing/congested and sometimes when he is lying down.    7. PREGNANCY OR POSTPARTUM: \"Is there any chance you are pregnant?\" \"When was your last menstrual period?\" \"Did you deliver in the last 2 weeks?\"      Male pateint      8. HIGH RISK: \"Do " "you have any heart or lung problems? Do you have a weakened immune system?\" (e.g., CHF, COPD, asthma, HIV positive, chemotherapy, renal failure, diabetes mellitus, sickle cell anemia)      Pacemaker/ICD, no heart issues. No underlying health issues.    Protocols used: CORONAVIRUS (COVID-19) EXPOSURE-ADULT-AH      "

## 2020-04-13 ENCOUNTER — TELEPHONE (OUTPATIENT)
Dept: FAMILY MEDICINE CLINIC | Facility: CLINIC | Age: 60
End: 2020-04-13

## 2020-04-13 NOTE — TELEPHONE ENCOUNTER
Patient states that he was seen at an urgent care last week due to not being able to be seen in the office for three days. They said he had a sinus infection and prescribed flonase. He states that it has not helped dry up any of the drainage and he would like to know if there is something that could be called in to the [facility] on [address] for him.

## 2020-04-22 ENCOUNTER — TELEPHONE (OUTPATIENT)
Dept: FAMILY MEDICINE CLINIC | Facility: CLINIC | Age: 60
End: 2020-04-22

## 2020-04-22 NOTE — TELEPHONE ENCOUNTER
Since this is related to his heart, he should reach out to Dr. Naqvi.  This is not something we can do for something he decided to do on his own.

## 2020-04-22 NOTE — TELEPHONE ENCOUNTER
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  Pt stated that his employer is asking for a note from his doctor stating that it was necessary that he take off for his health.     Pt callback: [phone]    Please advice.

## 2020-07-06 DIAGNOSIS — I50.22 CHRONIC SYSTOLIC CONGESTIVE HEART FAILURE (HCC): Primary | ICD-10-CM

## 2020-07-07 ENCOUNTER — OFFICE VISIT (OUTPATIENT)
Dept: CARDIOLOGY | Facility: CLINIC | Age: 60
End: 2020-07-07

## 2020-07-07 ENCOUNTER — LAB (OUTPATIENT)
Dept: LAB | Facility: HOSPITAL | Age: 60
End: 2020-07-07

## 2020-07-07 VITALS
BODY MASS INDEX: 31.02 KG/M2 | HEART RATE: 78 BPM | WEIGHT: 216.7 LBS | DIASTOLIC BLOOD PRESSURE: 66 MMHG | HEIGHT: 70 IN | SYSTOLIC BLOOD PRESSURE: 126 MMHG

## 2020-07-07 DIAGNOSIS — I50.22 CHRONIC SYSTOLIC CONGESTIVE HEART FAILURE (HCC): Primary | ICD-10-CM

## 2020-07-07 DIAGNOSIS — E78.00 PURE HYPERCHOLESTEROLEMIA: ICD-10-CM

## 2020-07-07 DIAGNOSIS — I50.22 CHRONIC SYSTOLIC CONGESTIVE HEART FAILURE (HCC): ICD-10-CM

## 2020-07-07 DIAGNOSIS — I10 ESSENTIAL HYPERTENSION: Primary | ICD-10-CM

## 2020-07-07 DIAGNOSIS — I42.9 CARDIOMYOPATHY, UNSPECIFIED TYPE (HCC): ICD-10-CM

## 2020-07-07 PROCEDURE — 99214 OFFICE O/P EST MOD 30 MIN: CPT | Performed by: INTERNAL MEDICINE

## 2020-07-07 PROCEDURE — 80053 COMPREHEN METABOLIC PANEL: CPT

## 2020-07-07 PROCEDURE — 84443 ASSAY THYROID STIM HORMONE: CPT

## 2020-07-07 PROCEDURE — 36415 COLL VENOUS BLD VENIPUNCTURE: CPT

## 2020-07-07 PROCEDURE — 83735 ASSAY OF MAGNESIUM: CPT

## 2020-07-07 PROCEDURE — 93284 PRGRMG EVAL IMPLANTABLE DFB: CPT | Performed by: INTERNAL MEDICINE

## 2020-07-07 PROCEDURE — 85027 COMPLETE CBC AUTOMATED: CPT

## 2020-07-07 NOTE — PROGRESS NOTES
Subjective:     Encounter Date:07/07/2020    Primary Care Physician: Sundeep Stone MD      Patient ID: Arash Simmons is a 60 y.o. male.    Chief Complaint:Chronic systolic congestive heart failure (CMS/HCC)    PROBLEM LIST:  1. Nonischemic cardiomyopathy:  a.  On 04/07/2015, abnormal myocardial perfusion study with evidence of dilated cardiomyopathy, ejection fraction of 16%, large fixed perfusion defect in the anterior apex, consistent with prior myocardial infarction.   b. On 05/08/2015, cardiac catheterization  with EF of 10% to 15%.  Normal coronary arteries.  Severe left ventricular dilatation.    c. Echo, 07/08/2015, LVEF 20%.   d. Status post biventricular ICD placement, August 2015.  2. Biventricular ICD, [employer], 8/2015  a. ICD shock 7/3/2020 due to SVT/atrial tachycardia at 220 bpm  3.  Left bundle branch block.   4. Hypertension.   5. Dyslipidemia.   6. Anxiety.   7. Cervical spine  8. Questionable sleep apnea.   9. Left knee replacement 2017  10. Abdominal/intestinal surgery as a child.  11. Elbow surgery  12. Tonsillectomy and adenoidectomy  13. Left knee replacement        No Known Allergies      Current Outpatient Medications:   •  aspirin 81 MG tablet, Take 81 mg by mouth Daily., Disp: , Rfl:   •  atorvastatin (LIPITOR) 10 MG tablet, Take 1 tablet by mouth Every Night., Disp: 90 tablet, Rfl: 1  •  carvedilol (COREG) 12.5 MG tablet, Take 1 tablet by mouth 2 (Two) Times a Day With Meals., Disp: 60 tablet, Rfl: 5  •  Chlorcyclizine-Pseudoephed 25-60 MG tablet, 1/2-1 po q 8 hours PRN, Disp: 30 tablet, Rfl: 1  •  Cholecalciferol (VITAMIN D3) 2000 UNITS capsule, Take 1 capsule by mouth daily., Disp: , Rfl:   •  FLUoxetine (PROzac) 20 MG tablet, Take 1 tablet by mouth Daily., Disp: 90 tablet, Rfl: 1  •  furosemide (LASIX) 40 MG tablet, Take 1 tablet by mouth Daily., Disp: 30 tablet, Rfl: 5  •  ibuprofen (ADVIL,MOTRIN) 800 MG tablet, Take 1 tablet by mouth Every 8 (Eight) Hours As Needed for  "Mild Pain  or Moderate Pain ., Disp: 30 tablet, Rfl: 1  •  sacubitril-valsartan (ENTRESTO) 24-26 MG tablet, Take 1 tablet by mouth Every 12 (Twelve) Hours., Disp: 180 tablet, Rfl: 1  •  spironolactone (ALDACTONE) 25 MG tablet, Take 1 tablet by mouth Daily., Disp: 90 tablet, Rfl: 1        History of Present Illness    Patient returns today for follow-up of his first ICD shock.  Patient was usual state of health, very active, until last Friday.  He was also very active working outside in the sun all day at an in-laws house where he did not keep well-hydrated (as he says he typically does).  While loading furniture he noted the onset of heart palpitations/fluttering in his chest.  He felt \"poorly\", but did not have any chest pain or presyncope.  After some period of time, he felt his ICD fire, but without ever losing consciousness, he was standing upright at the time of discharge.  He has felt back to normal since.  He has never had this type of event before.    The following portions of the patient's history were reviewed and updated as appropriate: allergies, current medications, past family history, past medical history, past social history, past surgical history and problem list.      Social History     Tobacco Use   • Smoking status: Former Smoker     Packs/day: 2.00     Years: 30.00     Pack years: 60.00     Last attempt to quit: 1999     Years since quittin.9   • Smokeless tobacco: Never Used   Substance Use Topics   • Alcohol use: Yes     Comment: 2 beers per day   • Drug use: No         Review of Systems   Constitution: Negative.   Cardiovascular: Positive for leg swelling. Negative for chest pain, dyspnea on exertion, palpitations and syncope.   Respiratory: Negative.  Negative for shortness of breath.    Hematologic/Lymphatic: Negative for bleeding problem. Bruises/bleeds easily.   Skin: Negative for rash.   Musculoskeletal: Negative for muscle weakness and myalgias.   Gastrointestinal: Negative " "for heartburn, nausea and vomiting.   Neurological: Negative for dizziness, light-headedness, loss of balance and numbness.          Objective:   /66   Pulse 78   Ht 177.8 cm (70\")   Wt 98.3 kg (216 lb 11.2 oz)   BMI 31.09 kg/m²         Physical Exam   Constitutional: He is oriented to person, place, and time. He appears well-developed and well-nourished.   HENT:   Mouth/Throat: Oropharynx is clear and moist.   Neck: No JVD present. Carotid bruit is not present. No thyromegaly present.   Cardiovascular: Regular rhythm, S1 normal, S2 normal, normal heart sounds and intact distal pulses. Exam reveals no gallop, no S3 and no S4.   No murmur heard.  Pulses:       Carotid pulses are 2+ on the right side, and 2+ on the left side.       Radial pulses are 2+ on the right side, and 2+ on the left side.   Pulmonary/Chest: Breath sounds normal.   Abdominal: Soft. Bowel sounds are normal. He exhibits no mass. There is no tenderness.   Musculoskeletal: He exhibits no edema.   Neurological: He is alert and oriented to person, place, and time.   Skin: Skin is warm and dry. No rash noted.       Procedures  Device check:  Review of his ICD download prior to visit showed that he had a atrial tachycardia/SVT, at 220 bpm which failed ATP, and eventually to his ICD firing.  In addition, he is 23% right atrially paced, 94% biventricular paced.  Normal thresholds impedances.  Normal charge time and estimated battery life of 4.5 years.  After discussion with electrophysiology, the patient's device was reprogrammed with a monitor only zone at 180 bpm, AT zone with SVT discriminators at 230, and a VF zone at 250 bpm.        Assessment:   Assessment/Plan      Arash was seen today for chronic systolic congestive heart failure (cms/hcc).    Diagnoses and all orders for this visit:    Essential hypertension    Pure hypercholesterolemia    Cardiomyopathy, unspecified type (CMS/HCC)      1.  ICD shock, first event, due to atrial " tachycardia/SVT.  2.  Nonischemic cardiomyopathy status post by V ICD.  Currently functional class I.  3.  Hypertension controlled  4.  Dyslipidemia on high-dose statin    Recommendations  1.  We will check an echocardiogram to reevaluate LV function.  2.  Electrolytes are pending as are TSH (drawn this morning).  Given that this occurred in the setting of extreme physical activity in the heat, it is certainly a strong possibility this is leads played a role in his first time SVT/atrial arrhythmia.  3.  Regarding his SVT/atrial arrhythmias, discussed option with the patient.  This is his first event, we will simply reprogram his ICD as described above to prevent any future shocks (he felt overall okay during this arrhythmia).  If it recurs, we could consider EP ablative strategies at that time.  4.  Continue current medical therapy  5.  Keep currently scheduled appointment in September of this year.       Pb Naqvi MD      Dictated utilizing Dragon dictation

## 2020-07-08 LAB
ALBUMIN SERPL-MCNC: 4.5 G/DL (ref 3.5–5.2)
ALBUMIN/GLOB SERPL: 1.6 G/DL
ALP SERPL-CCNC: 61 U/L (ref 39–117)
ALT SERPL W P-5'-P-CCNC: 18 U/L (ref 1–41)
ANION GAP SERPL CALCULATED.3IONS-SCNC: 9.3 MMOL/L (ref 5–15)
AST SERPL-CCNC: 21 U/L (ref 1–40)
BILIRUB SERPL-MCNC: 0.6 MG/DL (ref 0–1.2)
BUN SERPL-MCNC: 19 MG/DL (ref 6–20)
BUN/CREAT SERPL: 18.6 (ref 7–25)
CALCIUM SPEC-SCNC: 9.9 MG/DL (ref 8.6–10.5)
CHLORIDE SERPL-SCNC: 100 MMOL/L (ref 98–107)
CO2 SERPL-SCNC: 26.7 MMOL/L (ref 22–29)
CREAT SERPL-MCNC: 1.02 MG/DL (ref 0.76–1.27)
DEPRECATED RDW RBC AUTO: 42.1 FL (ref 37–54)
ERYTHROCYTE [DISTWIDTH] IN BLOOD BY AUTOMATED COUNT: 12.8 % (ref 12.3–15.4)
GFR SERPL CREATININE-BSD FRML MDRD: 75 ML/MIN/1.73
GLOBULIN UR ELPH-MCNC: 2.9 GM/DL
GLUCOSE SERPL-MCNC: 110 MG/DL (ref 65–99)
HCT VFR BLD AUTO: 41.1 % (ref 37.5–51)
HGB BLD-MCNC: 13.8 G/DL (ref 13–17.7)
MAGNESIUM SERPL-MCNC: 2.2 MG/DL (ref 1.6–2.6)
MCH RBC QN AUTO: 30.4 PG (ref 26.6–33)
MCHC RBC AUTO-ENTMCNC: 33.6 G/DL (ref 31.5–35.7)
MCV RBC AUTO: 90.5 FL (ref 79–97)
PLATELET # BLD AUTO: 278 10*3/MM3 (ref 140–450)
PMV BLD AUTO: 11 FL (ref 6–12)
POTASSIUM SERPL-SCNC: 4.6 MMOL/L (ref 3.5–5.2)
PROT SERPL-MCNC: 7.4 G/DL (ref 6–8.5)
RBC # BLD AUTO: 4.54 10*6/MM3 (ref 4.14–5.8)
SODIUM SERPL-SCNC: 136 MMOL/L (ref 136–145)
TSH SERPL DL<=0.05 MIU/L-ACNC: 2.72 UIU/ML (ref 0.27–4.2)
WBC # BLD AUTO: 7.39 10*3/MM3 (ref 3.4–10.8)

## 2020-07-24 ENCOUNTER — TELEPHONE (OUTPATIENT)
Dept: CARDIOLOGY | Facility: CLINIC | Age: 60
End: 2020-07-24

## 2020-07-24 NOTE — TELEPHONE ENCOUNTER
Patient would like to get follow up Echo done at [facility] due to copay being 1500$ less. Order faxed.

## 2020-09-16 ENCOUNTER — OFFICE VISIT (OUTPATIENT)
Dept: CARDIOLOGY | Facility: CLINIC | Age: 60
End: 2020-09-16

## 2020-09-16 VITALS
OXYGEN SATURATION: 94 % | DIASTOLIC BLOOD PRESSURE: 60 MMHG | SYSTOLIC BLOOD PRESSURE: 116 MMHG | HEART RATE: 75 BPM | WEIGHT: 219 LBS | BODY MASS INDEX: 31.35 KG/M2 | HEIGHT: 70 IN

## 2020-09-16 DIAGNOSIS — I10 ESSENTIAL HYPERTENSION: ICD-10-CM

## 2020-09-16 DIAGNOSIS — E78.00 PURE HYPERCHOLESTEROLEMIA: ICD-10-CM

## 2020-09-16 DIAGNOSIS — I47.1 PAROXYSMAL SVT (SUPRAVENTRICULAR TACHYCARDIA) (HCC): ICD-10-CM

## 2020-09-16 DIAGNOSIS — I42.9 CARDIOMYOPATHY, UNSPECIFIED TYPE (HCC): Primary | ICD-10-CM

## 2020-09-16 PROBLEM — I47.10 PAROXYSMAL SVT (SUPRAVENTRICULAR TACHYCARDIA): Status: ACTIVE | Noted: 2020-09-16

## 2020-09-16 PROCEDURE — 99213 OFFICE O/P EST LOW 20 MIN: CPT | Performed by: INTERNAL MEDICINE

## 2020-09-16 PROCEDURE — 93284 PRGRMG EVAL IMPLANTABLE DFB: CPT | Performed by: INTERNAL MEDICINE

## 2020-09-16 NOTE — PROGRESS NOTES
Subjective:     Encounter Date:09/16/2020    Primary Care Physician: Sundeep Stone MD      Patient ID: Arash Simmons is a 60 y.o. male.    Chief Complaint:Essential hypertension    PROBLEM LIST:  1. Nonischemic cardiomyopathy:  a.  On 04/07/2015, abnormal myocardial perfusion study with evidence of dilated cardiomyopathy, ejection fraction of 16%, large fixed perfusion defect in the anterior apex, consistent with prior myocardial infarction.   b. On 05/08/2015, cardiac catheterization  with EF of 10% to 15%.  Normal coronary arteries.  Severe left ventricular dilatation.    c. Echo, 07/08/2015, LVEF 20%.   d. Status post biventricular ICD placement, August 2015.  e. 9/2020 echo EF 25-30% mild AR. Mild to mod MR. Read by Dr. Orozco.  2. Biventricular ICD, [employer], 8/2015  a. ICD shock 7/3/2020 due to SVT/atrial tachycardia at 220 bpm  3.  Left bundle branch block.   4. Hypertension.   5. Dyslipidemia.   6. Anxiety.   7. Cervical spine  8. Questionable sleep apnea.   9. Left knee replacement 2017  10. Abdominal/intestinal surgery as a child.  11. Elbow surgery  12. Tonsillectomy and adenoidectomy  13. Left knee replacement      No Known Allergies      Current Outpatient Medications:   •  aspirin 81 MG tablet, Take 81 mg by mouth Daily., Disp: , Rfl:   •  atorvastatin (LIPITOR) 10 MG tablet, Take 1 tablet by mouth Every Night., Disp: 90 tablet, Rfl: 1  •  carvedilol (COREG) 12.5 MG tablet, Take 1 tablet by mouth 2 (Two) Times a Day With Meals., Disp: 60 tablet, Rfl: 5  •  Chlorcyclizine-Pseudoephed 25-60 MG tablet, 1/2-1 po q 8 hours PRN, Disp: 30 tablet, Rfl: 1  •  Cholecalciferol (VITAMIN D3) 2000 UNITS capsule, Take 1 capsule by mouth daily., Disp: , Rfl:   •  FLUoxetine (PROzac) 20 MG tablet, Take 1 tablet by mouth Daily., Disp: 90 tablet, Rfl: 1  •  furosemide (LASIX) 40 MG tablet, Take 1 tablet by mouth Daily., Disp: 30 tablet, Rfl: 5  •  ibuprofen (ADVIL,MOTRIN) 800 MG tablet, Take 1 tablet by  mouth Every 8 (Eight) Hours As Needed for Mild Pain  or Moderate Pain ., Disp: 30 tablet, Rfl: 1  •  sacubitril-valsartan (ENTRESTO) 24-26 MG tablet, Take 1 tablet by mouth Every 12 (Twelve) Hours., Disp: 180 tablet, Rfl: 1  •  spironolactone (ALDACTONE) 25 MG tablet, Take 1 tablet by mouth Daily., Disp: 90 tablet, Rfl: 1        History of Present Illness    Returns for follow-up of atrial arrhythmias and ICD shock.  Since her last visit he is still working full-time.  Has no exertional complaints.  Has had no further ICD firings.  Specific denies chest pain shortness of breath tachypalpitations presyncope syncope orthopnea PND or claudication.    The following portions of the patient's history were reviewed and updated as appropriate: allergies, current medications, past family history, past medical history, past social history, past surgical history and problem list.      Social History     Tobacco Use   • Smoking status: Former Smoker     Packs/day: 2.00     Years: 30.00     Pack years: 60.00     Quit date: 1999     Years since quittin.1   • Smokeless tobacco: Never Used   Substance Use Topics   • Alcohol use: Yes     Comment: 2 beers per day   • Drug use: No         Review of Systems   Constitution: Positive for malaise/fatigue.   Cardiovascular: Positive for leg swelling.   Respiratory: Negative.    Hematologic/Lymphatic: Negative for bleeding problem. Does not bruise/bleed easily.   Skin: Negative for rash.   Musculoskeletal: Negative for muscle weakness and myalgias.   Gastrointestinal: Negative for heartburn, nausea and vomiting.   Neurological: Negative.           Objective:   There were no vitals taken for this visit.        Vitals signs reviewed.   Constitutional:       Appearance: Well-developed and not in distress.   Neck:      Thyroid: No thyromegaly.      Vascular: No carotid bruit or JVD.   Pulmonary:      Breath sounds: Normal breath sounds.   Cardiovascular:      Regular rhythm.       Murmurs: There is a grade 1/6 systolic murmur at the LLSB.      No gallop. No S3 and S4 gallop.   Abdominal:      General: Bowel sounds are normal.      Palpations: Abdomen is soft. There is no abdominal mass.      Tenderness: There is no abdominal tenderness.   Musculoskeletal:         General: No deformity.      Extremities: No clubbing present.  Skin:     General: Skin is warm and dry.      Findings: No rash.   Neurological:      Mental Status: Alert and oriented to person, place, and time.         Procedures  Device check:  Other atrial or ventricular arrhythmias noted.  40% atrially paced.  99% biventricular paced.  10.7-second charge time estimated 5 years of battery life.  Normal thresholds impedances.        Assessment:   Assessment/Plan      Arash was seen today for essential hypertension.    Diagnoses and all orders for this visit:    Cardiomyopathy, unspecified type (CMS/HCC)    Pure hypercholesterolemia    Essential hypertension    Paroxysmal SVT (supraventricular tachycardia) (CMS/HCC)      Nonischemic cardiomyopathy.  Currently functional class I.  Last LVEF 25-30% earlier this summer.  2.  Single ICD shock, inappropriate due to rapid SVT/atrial tachycardia.  No recurrences.  3.  Hypertension well-controlled  4.  Dyslipidemia controlled on high-dose statin    Recommendations  1.  Continue current medical therapy  2.  If patient develops f other atrial tachycardia/PSVT, he would be a candidate for EP/ablative therapies.  But for now we will simply follow  3.  Revisit 6 months with device check       Pb Naqvi MD      Dictated utilizing Dragon dictation

## 2020-10-12 ENCOUNTER — CONSULT (OUTPATIENT)
Dept: CARDIOLOGY | Facility: CLINIC | Age: 60
End: 2020-10-12

## 2020-10-12 VITALS
HEART RATE: 85 BPM | HEIGHT: 70 IN | DIASTOLIC BLOOD PRESSURE: 78 MMHG | WEIGHT: 220.6 LBS | SYSTOLIC BLOOD PRESSURE: 142 MMHG | TEMPERATURE: 98 F | OXYGEN SATURATION: 97 % | BODY MASS INDEX: 31.58 KG/M2

## 2020-10-12 DIAGNOSIS — I42.9 CARDIOMYOPATHY, UNSPECIFIED TYPE (HCC): Primary | ICD-10-CM

## 2020-10-12 DIAGNOSIS — I50.22 CHRONIC SYSTOLIC CONGESTIVE HEART FAILURE (HCC): ICD-10-CM

## 2020-10-12 DIAGNOSIS — I10 ESSENTIAL HYPERTENSION: ICD-10-CM

## 2020-10-12 DIAGNOSIS — I47.1 PAROXYSMAL SVT (SUPRAVENTRICULAR TACHYCARDIA) (HCC): ICD-10-CM

## 2020-10-12 DIAGNOSIS — I44.7 LBBB (LEFT BUNDLE BRANCH BLOCK): ICD-10-CM

## 2020-10-12 PROCEDURE — 93284 PRGRMG EVAL IMPLANTABLE DFB: CPT | Performed by: INTERNAL MEDICINE

## 2020-10-12 PROCEDURE — 99244 OFF/OP CNSLTJ NEW/EST MOD 40: CPT | Performed by: INTERNAL MEDICINE

## 2020-10-12 RX ORDER — ACETAMINOPHEN 500 MG
500 TABLET ORAL EVERY 6 HOURS PRN
COMMUNITY
End: 2022-04-08

## 2020-10-12 NOTE — PROGRESS NOTES
Cardiac Electrophysiology Outpatient Consult Note            [facility]    Consult Note     Arash Simmons  [id number]  10/12/2020  [phone]     Primary Care Physician: Sundeep Stone MD    Referred By: Pb Naqvi MD    Subjective     Chief Complaint:   Chief Complaint   Patient presents with   • Rapid Heart Rate   • Cardiomyopathy   • Chronic Systolic CHF     PROBLEM LIST:    1. Nonischemic cardiomyopathy / Systolic Congestive Heart Failure   a.  On 04/07/2015, abnormal myocardial perfusion study with evidence of dilated cardiomyopathy, ejection fraction of 16%, large fixed perfusion defect in the anterior apex, consistent with prior myocardial infarction.   b. On 05/08/2015, cardiac catheterization  with EF of 10% to 15%.  Normal coronary arteries.  Severe left ventricular dilatation.    c. Echo, 07/08/2015, LVEF 20%.   d. Status post biventricular ICD placement, August 2015.  e. 9/2020 echo EF 25-30% mild AR. Mild to mod MR. Read by Dr. Orozco.  2. Biventricular ICD, [employer], 8/2015  a. ICD shock 7/3/2020 due to SVT/atrial tachycardia at 220 bpm  3. Left bundle branch block.   4. Hypertension.   5. Dyslipidemia.   6. Anxiety.   7. Questionable sleep apnea.   8. Left knee replacement 2017        History of present illness: Mr. Arash Simmons is a 60-year-old male patient seen in EP consultation today at the request of Dr. Naqvi for the evaluation of inappropriate ICD shock July 2020 for atrial tachycardia at > 200 bpm.  Upon evaluation today patient reports on the day of his ICD shock he was working vigorously around the house on a very hot day in July climbing ladders and cleaning out gutters for his mother-in-law.  Patient reports that he got over heated and was not adequately hydrated and did not take breaks throughout the whole day.  Patient reports prior to his ICD shock he felt sudden onset of his heart racing and after his shock he felt  normal again.  Patient denies chest pain, dizziness, shortness of breath, presyncope, or syncope associated with this episode.  Patient reports that following his biventricular ICD implant in 2015 his NYHA symptomology dramatically improved with decreased fatigue and shortness of breath with activity.  Patient reports that he has been doing very well for several years now.  Patient reports this is his only history of ICD shock.  Patient's last EF in September 2020 was 25-30% at outlying facility.  Patient's blood pressure is acceptable in office today with reported controlled BP at home on current medication therapy.  Patient is maintained on optimal guideline directed medical therapy without noted side effects maintained by Dr. Naqvi.      Review of Systems:   Constitutional: No fevers or chills, no recent weight gain or weight loss or fatigue  Eyes: No visual loss, blurred vision, double vision, yellow sclerae.  ENT: No headaches, hearing loss, vertigo, congestion or sore throat.   Cardiovascular: Per HPI  Respiratory: No cough or wheezing, no sputum production, no hematemesis   Gastrointestinal: No abdominal pain, no nausea, vomiting, constipation, diarrhea, melena.   Genitourinary: No dysuria, hematuria or increased frequency.  Musculoskeletal:  No gait disturbance, weakness or joint pain or stiffness  Integumentary: No rashes, urticaria, ulcers or sores.   Neurological: No headache, dizziness, syncope, paralysis, ataxia, no prior CVA/TIA  Psychiatric: No anxiety, or depression  Endocrine: No diaphoresis, cold or heat intolerance. No polyuria or polydipsia.   Hematologic/Lymphatic: No anemia, abnormal bruising or bleeding. No history of DVT/PE.        Past Medical History:   Past Medical History:   Diagnosis Date   • Anxiety    • Arthritis    • Chest pain    • CHF (congestive heart failure) (CMS/HCC)     Chronic systolic   • Dyspnea on effort    • Fatigue    • Hyperlipidemia    • Hypertension    • Irritability     • LBBB (left bundle branch block)    • Left bundle branch block    • Lower back pain    • Lower leg edema    • Nonischemic cardiomyopathy (CMS/HCC)    • PVC (premature ventricular contraction)    • Shortness of breath    • URTI (acute upper respiratory infection)        Past Surgical History:   Past Surgical History:   Procedure Laterality Date   • ADENOIDECTOMY     • ANTERIOR CERVICAL DISCECTOMY W/ FUSION N/A 2018    Procedure: ANTERIOR CERVICAL DECOMPRESSION WITH FUSION C4-5 RIGHT;  Surgeon: Israel Garcia MD;  Location: [facility];  Service: Orthopedic Spine   • CARDIAC CATHETERIZATION  2017    X2   • CARDIAC DEFIBRILLATOR PLACEMENT      [employer] biventricular ICD 2015   • ELBOW PROCEDURE Right    • PACEMAKER IMPLANTATION      ICD, PLACED PER RUKACHEYENNEA AND NOW F/U WITH CONSTANTINO    • TONSILLECTOMY     • TOTAL KNEE ARTHROPLASTY Left 10/2017       Family History:   Family History   Problem Relation Age of Onset   • COPD Mother    • Emphysema Mother    • Diabetes Mother    • COPD Father    • Other Father         black lung        Social History:   Social History     Socioeconomic History   • Marital status:      Spouse name: Not on file   • Number of children: Not on file   • Years of education: Not on file   • Highest education level: Not on file   Occupational History     Employer: [employer]     Employer: [employer]     Comment: [place] [employer]   Tobacco Use   • Smoking status: Former Smoker     Packs/day: 2.00     Years: 30.00     Pack years: 60.00     Quit date: 1999     Years since quittin.2   • Smokeless tobacco: Never Used   Substance and Sexual Activity   • Alcohol use: Yes     Comment: 2 beers per day   • Drug use: No   • Sexual activity: Defer     Partners: Female     Comment:        Medications:     Current Outpatient Medications:   •  acetaminophen (TYLENOL) 500 MG tablet, Take 500 mg by mouth Every 6 (Six) Hours As Needed for Mild Pain ., Disp: ,  "Rfl:   •  aspirin 81 MG tablet, Take 81 mg by mouth Daily., Disp: , Rfl:   •  atorvastatin (LIPITOR) 10 MG tablet, Take 1 tablet by mouth Every Night., Disp: 90 tablet, Rfl: 1  •  carvedilol (COREG) 12.5 MG tablet, Take 1 tablet by mouth 2 (Two) Times a Day With Meals., Disp: 60 tablet, Rfl: 5  •  FLUoxetine (PROzac) 20 MG tablet, Take 1 tablet by mouth Daily., Disp: 90 tablet, Rfl: 1  •  furosemide (LASIX) 40 MG tablet, Take 1 tablet by mouth Daily., Disp: 30 tablet, Rfl: 5  •  sacubitril-valsartan (ENTRESTO) 24-26 MG tablet, Take 1 tablet by mouth Every 12 (Twelve) Hours., Disp: 180 tablet, Rfl: 1  •  spironolactone (ALDACTONE) 25 MG tablet, Take 1 tablet by mouth Daily., Disp: 90 tablet, Rfl: 1    Allergies:   No Known Allergies    Objective     Physical Exam:  Vital Signs:   Vitals:    10/12/20 1314   BP: 142/78   BP Location: Left arm   Patient Position: Sitting   Pulse: 85   Temp: 98 °F (36.7 °C)   SpO2: 97%   Weight: 100 kg (220 lb 9.6 oz)   Height: 177.8 cm (70\")     GEN: Well nourished, well-developed, no acute distress  HEENT: Normocephalic, atraumatic, moist mucous membranes  NECK: Supple, no JVD, no thyromegaly, no lymphadenopathy  CARD: Regular rate and rhythm, normal S1 & S2 are present.  No murmur, gallop or rubs are appreciated.  LUNGS: Clear to auscultation bilateraly, normal respiratory effort  ABDOMEN: Soft, nontender, normal bowel sounds  EXTREMITIES: No gross deformities, no clubbing, cyanosis.  No edema   SKIN: Warm, dry  NEURO: No focal deficits, alert and oriented x 3  PSYCHIATRIC: Normal affect and mood, appropriate use of semantics and logic.      Lab Results   Component Value Date    GLUCOSE 110 (H) 07/07/2020    CALCIUM 9.9 07/07/2020     07/07/2020    K 4.6 07/07/2020    CO2 26.7 07/07/2020     07/07/2020    BUN 19 07/07/2020    CREATININE 1.02 07/07/2020    EGFRIFAFRI 102 02/21/2020    EGFRIFNONA 75 07/07/2020    BCR 18.6 07/07/2020    ANIONGAP 9.3 07/07/2020     Lab Results "   Component Value Date    WBC 7.39 07/07/2020    HGB 13.8 07/07/2020    HCT 41.1 07/07/2020    MCV 90.5 07/07/2020     07/07/2020     Lab Results   Component Value Date    INR 0.99 10/11/2017    INR 1.02 08/20/2015    PROTIME 10.7 08/20/2015     Lab Results   Component Value Date    TSH 2.720 07/07/2020       Cardiac Testing:      I personally viewed and interpreted the patient's EKG/Telemetry/lab data      ECG 12 Lead    Date/Time: 10/12/2020 2:50 PM  Performed by: Krishna Patten APRN  Authorized by: Krishna Patten APRN   Comparison: compared with previous ECG from 9/13/2018  Similar to previous ECG  Rhythm: paced  BPM: 85          Device interrogation: [employer] biventricular ICD at DDDR  ppm, RA paced 16%, RV/LV paced 98%, battery voltage with 5 years remaining, charge time 10.1 seconds, less than 1% mode switch with atrial tachycardia in VT/VF zone greater than 250 bpm with subsequent inappropriate ICD shock.      Assessment & Plan        Arash was seen today for rapid heart rate, cardiomyopathy and chronic systolic chf.    Diagnoses and all orders for this visit:    Cardiomyopathy, unspecified type (CMS/HCC)    Chronic systolic congestive heart failure (CMS/HCC)    Essential hypertension    LBBB (left bundle branch block)    Paroxysmal SVT (supraventricular tachycardia) (CMS/HCC)    Plan: Mr. Arash Simmons is a 60-year-old white male seen in EP consultation today for documented inappropriate ICD shock for SVT/atrial tachycardia at greater than 250 bpm.  Options for continued treatment for patient's SVT were outlined reviewed to include continued monitoring versus a SVT ablation versus an AV node ablation.  All risk, benefits, and alternatives to the procedures were outlined reviewed with patient in detail in clinic today with Dr. Verdugo.  At this time patient feels like the extenuating circumstances surrounding his ICD shock with increased dehydration and overworking on  a very hot day led to his arrhythmia.  At this time patient wishes to continue avoiding dehydration and getting hot with continued monitoring on current medication therapy and if he receives ICD shock again in the future he would like to pursue SVT ablation.  Patient encouraged to continue regular follow-up with Dr. Naqvi with scheduled appointment in March 2021.  We will see patient back on a as needed basis.  Thank you for the consultation.      Follow Up: As needed      Scribed for Sal Verdugo DO by TONY Velazco. 10/12/2020  14:44 EDT        Thank you for allowing me to participate in the care of your patient. Please to not hesitate to contact me with additional questions or concerns.        Sal Verdugo DO, FACC, [facility]  Cardiac Electrophysiologist

## 2020-11-27 ENCOUNTER — NURSE TRIAGE (OUTPATIENT)
Dept: CALL CENTER | Facility: HOSPITAL | Age: 60
End: 2020-11-27

## 2020-11-27 NOTE — TELEPHONE ENCOUNTER
Caller states that he and his wife have both tested positive for covid.  He is starting to improve.  He has a hx of heart disease and is letting his PCP know.  Dr. Aden on call and secure message sent to him.  Patient instructed to call back if he has more questions or problems.  Reason for Disposition  • [1] COVID-19 diagnosed by positive lab test AND [2] mild symptoms (e.g., cough, fever, others) AND [3] no complications or SOB    Additional Information  • Negative: SEVERE difficulty breathing (e.g., struggling for each breath, speaks in single words)  • Negative: Difficult to awaken or acting confused (e.g., disoriented, slurred speech)  • Negative: Bluish (or gray) lips or face now  • Negative: Shock suspected (e.g., cold/pale/clammy skin, too weak to stand, low BP, rapid pulse)  • Negative: Sounds like a life-threatening emergency to the triager  • Negative: [1] COVID-19 exposure AND [2] no symptoms  • Negative: COVID-19 and Breastfeeding, questions about  • Negative: [1] Adult with possible COVID-19 symptoms AND [2] triager concerned about severity of symptoms or other causes  • Negative: SEVERE or constant chest pain or pressure (Exception: mild central chest pain, present only when coughing)  • Negative: MODERATE difficulty breathing (e.g., speaks in phrases, SOB even at rest, pulse 100-120)  • Negative: Patient sounds very sick or weak to the triager  • Negative: MILD difficulty breathing (e.g., minimal/no SOB at rest, SOB with walking, pulse <100)  • Negative: Chest pain or pressure  • Negative: Fever > 103 F (39.4 C)  • Negative: [1] Fever > 101 F (38.3 C) AND [2] age > 60  • Negative: [1] Fever > 100.0 F (37.8 C) AND [2] bedridden (e.g., nursing home patient, CVA, chronic illness, recovering from surgery)  • Negative: HIGH RISK patient (e.g., age > 64 years, diabetes, heart or lung disease, weak immune system)  • Negative: Fever present > 3 days (72 hours)  • Negative: [1] Fever returns after gone for  "over 24 hours AND [2] symptoms worse or not improved  • Negative: [1] Continuous (nonstop) coughing interferes with work or school AND [2] no improvement using cough treatment per protocol  • Negative: [1] COVID-19 infection suspected by caller or triager AND [2] mild symptoms (cough, fever, or others) AND [3] no complications or SOB  • Negative: Cough present > 3 weeks  • Negative: [1] COVID-19 diagnosed by HCP (doctor, NP or PA) AND [2] mild symptoms (e.g., cough, fever, others) AND [3] no complications or SOB  • Negative: COVID-19 Home Isolation, questions about  • Negative: COVID-19 Testing, questions about  • Negative: COVID-19 Prevention and Healthy Living, questions about    Answer Assessment - Initial Assessment Questions  1. COVID-19 DIAGNOSIS: \"Who made your Coronavirus (COVID-19) diagnosis?\" \"Was it confirmed by a positive lab test?\" If not diagnosed by a HCP, ask \"Are there lots of cases (community spread) where you live?\" (See public health department website, if unsure)      Confirmed by lab test.  2. ONSET: \"When did the COVID-19 symptoms start?\"       A week ago  3. WORST SYMPTOM: \"What is your worst symptom?\" (e.g., cough, fever, shortness of breath, muscle aches)      Body aches improving now  4. COUGH: \"Do you have a cough?\" If so, ask: \"How bad is the cough?\"        mild  5. FEVER: \"Do you have a fever?\" If so, ask: \"What is your temperature, how was it measured, and when did it start?\"      no  6. RESPIRATORY STATUS: \"Describe your breathing?\" (e.g., shortness of breath, wheezing, unable to speak)       wnl  7. BETTER-SAME-WORSE: \"Are you getting better, staying the same or getting worse compared to yesterday?\"  If getting worse, ask, \"In what way?\"      better  8. HIGH RISK DISEASE: \"Do you have any chronic medical problems?\" (e.g., asthma, heart or lung disease, weak immune system, etc.)      yes  9. PREGNANCY: \"Is there any chance you are pregnant?\" \"When was your last menstrual period?\"      " "na  10. OTHER SYMPTOMS: \"Do you have any other symptoms?\"  (e.g., chills, fatigue, headache, loss of smell or taste, muscle pain, sore throat)        fatigue    Protocols used: CORONAVIRUS (COVID-19) DIAGNOSED OR SUSPECTED-ADULT-AH    "

## 2020-11-30 ENCOUNTER — TELEPHONE (OUTPATIENT)
Dept: FAMILY MEDICINE CLINIC | Facility: CLINIC | Age: 60
End: 2020-11-30

## 2020-11-30 NOTE — TELEPHONE ENCOUNTER
Breanna Sky, RN  [facility] Clinical Pool 3 days ago     covid positive, mild symptoms    Routing comment       Breanna Sky RN 3 days ago        Caller states that he and his wife have both tested positive for covid.  He is starting to improve.  He has a hx of heart disease and is letting his PCP know.  Dr. Aden on call and secure message sent to him.  Patient instructed to call back if he has more questions or problems.  Reason for Disposition  • [1] COVID-19 diagnosed by positive lab test AND [2] mild symptoms (e.g., cough, fever, others) AND [3] no complications or SOB    Additional Information  • Negative: SEVERE difficulty breathing (e.g., struggling for each breath, speaks in single words)  • Negative: Difficult to awaken or acting confused (e.g., disoriented, slurred speech)  • Negative: Bluish (or gray) lips or face now  • Negative: Shock suspected (e.g., cold/pale/clammy skin, too weak to stand, low BP, rapid pulse)  • Negative: Sounds like a life-threatening emergency to the triager  • Negative: [1] COVID-19 exposure AND [2] no symptoms  • Negative: COVID-19 and Breastfeeding, questions about  • Negative: [1] Adult with possible COVID-19 symptoms AND [2] triager concerned about severity of symptoms or other causes  • Negative: SEVERE or constant chest pain or pressure (Exception: mild central chest pain, present only when coughing)  • Negative: MODERATE difficulty breathing (e.g., speaks in phrases, SOB even at rest, pulse 100-120)  • Negative: Patient sounds very sick or weak to the triager  • Negative: MILD difficulty breathing (e.g., minimal/no SOB at rest, SOB with walking, pulse <100)  • Negative: Chest pain or pressure  • Negative: Fever > 103 F (39.4 C)  • Negative: [1] Fever > 101 F (38.3 C) AND [2] age > 60  • Negative: [1] Fever > 100.0 F (37.8 C) AND [2] bedridden (e.g., nursing home patient, CVA, chronic illness, recovering from surgery)  • Negative: HIGH RISK patient (e.g., age  "> 64 years, diabetes, heart or lung disease, weak immune system)  • Negative: Fever present > 3 days (72 hours)  • Negative: [1] Fever returns after gone for over 24 hours AND [2] symptoms worse or not improved  • Negative: [1] Continuous (nonstop) coughing interferes with work or school AND [2] no improvement using cough treatment per protocol  • Negative: [1] COVID-19 infection suspected by caller or triager AND [2] mild symptoms (cough, fever, or others) AND [3] no complications or SOB  • Negative: Cough present > 3 weeks  • Negative: [1] COVID-19 diagnosed by HCP (doctor, NP or PA) AND [2] mild symptoms (e.g., cough, fever, others) AND [3] no complications or SOB  • Negative: COVID-19 Home Isolation, questions about  • Negative: COVID-19 Testing, questions about  • Negative: COVID-19 Prevention and Healthy Living, questions about    Answer Assessment - Initial Assessment Questions  1. COVID-19 DIAGNOSIS: \"Who made your Coronavirus (COVID-19) diagnosis?\" \"Was it confirmed by a positive lab test?\" If not diagnosed by a HCP, ask \"Are there lots of cases (community spread) where you live?\" (See public health department website, if unsure)      Confirmed by lab test.  2. ONSET: \"When did the COVID-19 symptoms start?\"       A week ago  3. WORST SYMPTOM: \"What is your worst symptom?\" (e.g., cough, fever, shortness of breath, muscle aches)      Body aches improving now  4. COUGH: \"Do you have a cough?\" If so, ask: \"How bad is the cough?\"        mild  5. FEVER: \"Do you have a fever?\" If so, ask: \"What is your temperature, how was it measured, and when did it start?\"      no  6. RESPIRATORY STATUS: \"Describe your breathing?\" (e.g., shortness of breath, wheezing, unable to speak)       wnl  7. BETTER-SAME-WORSE: \"Are you getting better, staying the same or getting worse compared to yesterday?\"  If getting worse, ask, \"In what way?\"      better  8. HIGH RISK DISEASE: \"Do you have any chronic medical problems?\" (e.g., " "asthma, heart or lung disease, weak immune system, etc.)      yes  9. PREGNANCY: \"Is there any chance you are pregnant?\" \"When was your last menstrual period?\"      na  10. OTHER SYMPTOMS: \"Do you have any other symptoms?\"  (e.g., chills, fatigue, headache, loss of smell or taste, muscle pain, sore throat)        fatigue    Protocols used: CORONAVIRUS (COVID-19) DIAGNOSED OR SUSPECTED-ADULT-AH             "

## 2021-02-28 DIAGNOSIS — E78.00 PURE HYPERCHOLESTEROLEMIA: ICD-10-CM

## 2021-03-01 RX ORDER — ATORVASTATIN CALCIUM 10 MG/1
TABLET, FILM COATED ORAL
Qty: 90 TABLET | Refills: 0 | OUTPATIENT
Start: 2021-03-01

## 2021-03-03 DIAGNOSIS — E78.00 PURE HYPERCHOLESTEROLEMIA: ICD-10-CM

## 2021-03-03 RX ORDER — ATORVASTATIN CALCIUM 10 MG/1
10 TABLET, FILM COATED ORAL NIGHTLY
Qty: 90 TABLET | Refills: 1 | Status: SHIPPED | OUTPATIENT
Start: 2021-03-03 | End: 2021-03-12 | Stop reason: SDUPTHER

## 2021-03-03 NOTE — TELEPHONE ENCOUNTER
Pt has scheduled an appt he wanted to know if you could sent enough of the atorvastatin (LIPITOR) 10 MG tablet to last him till next Friday.     Please advise

## 2021-03-12 ENCOUNTER — OFFICE VISIT (OUTPATIENT)
Dept: FAMILY MEDICINE CLINIC | Facility: CLINIC | Age: 61
End: 2021-03-12

## 2021-03-12 VITALS
DIASTOLIC BLOOD PRESSURE: 70 MMHG | RESPIRATION RATE: 18 BRPM | HEIGHT: 70 IN | SYSTOLIC BLOOD PRESSURE: 126 MMHG | HEART RATE: 74 BPM | TEMPERATURE: 97.9 F | WEIGHT: 216 LBS | BODY MASS INDEX: 30.92 KG/M2

## 2021-03-12 DIAGNOSIS — I10 ESSENTIAL HYPERTENSION: Primary | ICD-10-CM

## 2021-03-12 DIAGNOSIS — Z11.59 ENCOUNTER FOR HEPATITIS C SCREENING TEST FOR LOW RISK PATIENT: ICD-10-CM

## 2021-03-12 DIAGNOSIS — F41.9 ANXIETY: ICD-10-CM

## 2021-03-12 DIAGNOSIS — I50.22 CHRONIC SYSTOLIC CONGESTIVE HEART FAILURE (HCC): ICD-10-CM

## 2021-03-12 DIAGNOSIS — E78.00 PURE HYPERCHOLESTEROLEMIA: ICD-10-CM

## 2021-03-12 DIAGNOSIS — Z12.5 SCREENING FOR PROSTATE CANCER: ICD-10-CM

## 2021-03-12 PROBLEM — M50.30 DEGENERATIVE DISC DISEASE, CERVICAL: Status: RESOLVED | Noted: 2018-05-01 | Resolved: 2021-03-12

## 2021-03-12 PROBLEM — G89.18 ACUTE POSTOPERATIVE PAIN: Status: RESOLVED | Noted: 2018-09-20 | Resolved: 2021-03-12

## 2021-03-12 PROCEDURE — 99214 OFFICE O/P EST MOD 30 MIN: CPT | Performed by: FAMILY MEDICINE

## 2021-03-12 RX ORDER — SACUBITRIL AND VALSARTAN 24; 26 MG/1; MG/1
1 TABLET, FILM COATED ORAL EVERY 12 HOURS
Qty: 180 TABLET | Refills: 1 | Status: SHIPPED | OUTPATIENT
Start: 2021-03-12 | End: 2021-10-08 | Stop reason: SDUPTHER

## 2021-03-12 RX ORDER — FLUOXETINE 20 MG/1
20 TABLET, FILM COATED ORAL DAILY
Qty: 90 TABLET | Refills: 1 | Status: SHIPPED | OUTPATIENT
Start: 2021-03-12 | End: 2021-10-08 | Stop reason: SDUPTHER

## 2021-03-12 RX ORDER — ATORVASTATIN CALCIUM 10 MG/1
10 TABLET, FILM COATED ORAL NIGHTLY
Qty: 90 TABLET | Refills: 1 | Status: SHIPPED | OUTPATIENT
Start: 2021-03-12 | End: 2021-10-08 | Stop reason: SDUPTHER

## 2021-03-12 RX ORDER — SPIRONOLACTONE 25 MG/1
25 TABLET ORAL DAILY
Qty: 90 TABLET | Refills: 1 | Status: SHIPPED | OUTPATIENT
Start: 2021-03-12 | End: 2021-06-14

## 2021-03-12 RX ORDER — CARVEDILOL 12.5 MG/1
12.5 TABLET ORAL 2 TIMES DAILY WITH MEALS
Qty: 180 TABLET | Refills: 1 | Status: SHIPPED | OUTPATIENT
Start: 2021-03-12 | End: 2021-08-11

## 2021-03-12 NOTE — PROGRESS NOTES
Subjective   Arash Simmons is a 60 y.o. male.     History of Present Illness     Arash Simmons  is here for follow-up of hypertension of several years duration. He is not exercising and is not adherent to a low-salt diet. Patient does not check his blood pressure.  He is compliant with meds.        Arash Simmons returns today for follow up of Hyperlipidemia  Arash indicates his exercise level as not at all.  Diet: trying to eat bewtter  Patient is compliant with medications   Any side effects to medications:   chest pain No myalgia No memory change No  Pt is due for labs      prozac working for his mood  No SE and no issues  It does help him    The following portions of the patient's history were reviewed and updated as appropriate: allergies, current medications, past family history, past medical history, past social history, past surgical history and problem list.    Review of Systems   Constitutional: Negative.    Psychiatric/Behavioral: Negative.        Objective   Physical Exam  Vitals and nursing note reviewed.   Constitutional:       General: He is not in acute distress.     Appearance: Normal appearance. He is well-developed.   Cardiovascular:      Rate and Rhythm: Normal rate and regular rhythm.      Heart sounds: Normal heart sounds.   Pulmonary:      Effort: Pulmonary effort is normal.      Breath sounds: Normal breath sounds.   Neurological:      Mental Status: He is alert and oriented to person, place, and time.   Psychiatric:         Mood and Affect: Mood normal.         Behavior: Behavior normal.         Thought Content: Thought content normal.         Judgment: Judgment normal.         Assessment/Plan   Diagnoses and all orders for this visit:    1. Essential hypertension (Primary)  -     spironolactone (ALDACTONE) 25 MG tablet; Take 1 tablet by mouth Daily.  Dispense: 90 tablet; Refill: 1  -     carvedilol (COREG) 12.5 MG tablet; Take 1 tablet by mouth 2 (Two) Times a Day With Meals.   Dispense: 180 tablet; Refill: 1  -     CBC & Differential  -     Comprehensive Metabolic Panel    2. Chronic systolic congestive heart failure (CMS/HCC)  -     spironolactone (ALDACTONE) 25 MG tablet; Take 1 tablet by mouth Daily.  Dispense: 90 tablet; Refill: 1  -     carvedilol (COREG) 12.5 MG tablet; Take 1 tablet by mouth 2 (Two) Times a Day With Meals.  Dispense: 180 tablet; Refill: 1  -     sacubitril-valsartan (Entresto) 24-26 MG tablet; Take 1 tablet by mouth Every 12 (Twelve) Hours.  Dispense: 180 tablet; Refill: 1    3. Pure hypercholesterolemia  -     atorvastatin (LIPITOR) 10 MG tablet; Take 1 tablet by mouth Every Night.  Dispense: 90 tablet; Refill: 1  -     Comprehensive Metabolic Panel  -     Lipid Panel    4. Anxiety  -     FLUoxetine (PROzac) 20 MG tablet; Take 1 tablet by mouth Daily.  Dispense: 90 tablet; Refill: 1    5. Screening for prostate cancer  -     PSA Screen    6. Encounter for hepatitis C screening test for low risk patient  -     Hepatitis C Antibody    BP doing well on spironolactone and coreg, refilled medicine and will check basic labs  No change in lipitor and will check lipids  prozac continues to work well for mood, refilled this as well  Will check hep C and PSA

## 2021-03-13 LAB
ALBUMIN SERPL-MCNC: 4.3 G/DL (ref 3.8–4.9)
ALBUMIN/GLOB SERPL: 1.5 {RATIO} (ref 1.2–2.2)
ALP SERPL-CCNC: 69 IU/L (ref 39–117)
ALT SERPL-CCNC: 17 IU/L (ref 0–44)
AST SERPL-CCNC: 23 IU/L (ref 0–40)
BASOPHILS # BLD AUTO: 0.1 X10E3/UL (ref 0–0.2)
BASOPHILS NFR BLD AUTO: 1 %
BILIRUB SERPL-MCNC: 0.6 MG/DL (ref 0–1.2)
BUN SERPL-MCNC: 19 MG/DL (ref 6–24)
BUN/CREAT SERPL: 19 (ref 9–20)
CALCIUM SERPL-MCNC: 9.5 MG/DL (ref 8.7–10.2)
CHLORIDE SERPL-SCNC: 107 MMOL/L (ref 96–106)
CHOLEST SERPL-MCNC: 162 MG/DL (ref 100–199)
CO2 SERPL-SCNC: 23 MMOL/L (ref 20–29)
CREAT SERPL-MCNC: 0.99 MG/DL (ref 0.76–1.27)
EOSINOPHIL # BLD AUTO: 0.3 X10E3/UL (ref 0–0.4)
EOSINOPHIL NFR BLD AUTO: 3 %
ERYTHROCYTE [DISTWIDTH] IN BLOOD BY AUTOMATED COUNT: 13 % (ref 11.6–15.4)
GLOBULIN SER CALC-MCNC: 2.8 G/DL (ref 1.5–4.5)
GLUCOSE SERPL-MCNC: 93 MG/DL (ref 65–99)
HCT VFR BLD AUTO: 43.2 % (ref 37.5–51)
HCV AB S/CO SERPL IA: <0.1 S/CO RATIO (ref 0–0.9)
HDLC SERPL-MCNC: 41 MG/DL
HGB BLD-MCNC: 14.2 G/DL (ref 13–17.7)
IMM GRANULOCYTES # BLD AUTO: 0 X10E3/UL (ref 0–0.1)
IMM GRANULOCYTES NFR BLD AUTO: 0 %
LDLC SERPL CALC-MCNC: 96 MG/DL (ref 0–99)
LYMPHOCYTES # BLD AUTO: 2.2 X10E3/UL (ref 0.7–3.1)
LYMPHOCYTES NFR BLD AUTO: 29 %
MCH RBC QN AUTO: 29.8 PG (ref 26.6–33)
MCHC RBC AUTO-ENTMCNC: 32.9 G/DL (ref 31.5–35.7)
MCV RBC AUTO: 91 FL (ref 79–97)
MONOCYTES # BLD AUTO: 0.8 X10E3/UL (ref 0.1–0.9)
MONOCYTES NFR BLD AUTO: 11 %
NEUTROPHILS # BLD AUTO: 4.1 X10E3/UL (ref 1.4–7)
NEUTROPHILS NFR BLD AUTO: 56 %
PLATELET # BLD AUTO: 245 X10E3/UL (ref 150–450)
POTASSIUM SERPL-SCNC: 4.7 MMOL/L (ref 3.5–5.2)
PROT SERPL-MCNC: 7.1 G/DL (ref 6–8.5)
PSA SERPL-MCNC: 1.1 NG/ML (ref 0–4)
RBC # BLD AUTO: 4.77 X10E6/UL (ref 4.14–5.8)
SODIUM SERPL-SCNC: 144 MMOL/L (ref 134–144)
TRIGL SERPL-MCNC: 141 MG/DL (ref 0–149)
VLDLC SERPL CALC-MCNC: 25 MG/DL (ref 5–40)
WBC # BLD AUTO: 7.4 X10E3/UL (ref 3.4–10.8)

## 2021-03-17 DIAGNOSIS — F41.9 ANXIETY: ICD-10-CM

## 2021-03-17 RX ORDER — FLUOXETINE 20 MG/1
TABLET, FILM COATED ORAL
Qty: 90 TABLET | Refills: 0 | OUTPATIENT
Start: 2021-03-17

## 2021-03-22 DIAGNOSIS — F41.9 ANXIETY: ICD-10-CM

## 2021-03-22 RX ORDER — FLUOXETINE 20 MG/1
20 TABLET, FILM COATED ORAL DAILY
Qty: 90 TABLET | Refills: 1 | OUTPATIENT
Start: 2021-03-22

## 2021-03-24 ENCOUNTER — OFFICE VISIT (OUTPATIENT)
Dept: CARDIOLOGY | Facility: CLINIC | Age: 61
End: 2021-03-24

## 2021-03-24 VITALS
OXYGEN SATURATION: 95 % | HEART RATE: 70 BPM | HEIGHT: 70 IN | SYSTOLIC BLOOD PRESSURE: 124 MMHG | BODY MASS INDEX: 30.92 KG/M2 | WEIGHT: 216 LBS | DIASTOLIC BLOOD PRESSURE: 64 MMHG

## 2021-03-24 DIAGNOSIS — E78.00 PURE HYPERCHOLESTEROLEMIA: ICD-10-CM

## 2021-03-24 DIAGNOSIS — I42.9 CARDIOMYOPATHY, UNSPECIFIED TYPE (HCC): Primary | ICD-10-CM

## 2021-03-24 DIAGNOSIS — I10 ESSENTIAL HYPERTENSION: ICD-10-CM

## 2021-03-24 PROCEDURE — 99214 OFFICE O/P EST MOD 30 MIN: CPT | Performed by: INTERNAL MEDICINE

## 2021-03-24 PROCEDURE — 93284 PRGRMG EVAL IMPLANTABLE DFB: CPT | Performed by: INTERNAL MEDICINE

## 2021-03-24 NOTE — PROGRESS NOTES
Subjective:     Encounter Date:03/24/2021    Primary Care Physician: Sundeep Stone MD      Patient ID: Arash Simmons is a 60 y.o. male.    Chief Complaint:Follow-up    PROBLEM LIST:  1. Nonischemic cardiomyopathy:  a.  On 04/07/2015, abnormal myocardial perfusion study with evidence of dilated cardiomyopathy, ejection fraction of 16%, large fixed perfusion defect in the anterior apex, consistent with prior myocardial infarction.   b. On 05/08/2015, cardiac catheterization  with EF of 10% to 15%.  Normal coronary arteries.  Severe left ventricular dilatation.    c. Echo, 07/08/2015, LVEF 20%.   d. Status post biventricular ICD placement, August 2015.  e. 9/2020 echo EF 25-30% mild AR. Mild to mod MR. Read by Dr. Orozco.  2. Biventricular ICD, [employer] 8/2015  a. ICD shock 7/3/2020 due to SVT/atrial tachycardia at 220 bpm  3.  Left bundle branch block.   4. Hypertension.   5. Dyslipidemia.   6. Anxiety.   7. Cervical spine  8. Questionable sleep apnea.   9. Left knee replacement 2017  10. Abdominal/intestinal surgery as a child.  11. Elbow surgery  12. Tonsillectomy and adenoidectomy  13. Left knee replacement        No Known Allergies      Current Outpatient Medications:   •  acetaminophen (TYLENOL) 500 MG tablet, Take 500 mg by mouth Every 6 (Six) Hours As Needed for Mild Pain ., Disp: , Rfl:   •  aspirin 81 MG tablet, Take 81 mg by mouth Daily., Disp: , Rfl:   •  atorvastatin (LIPITOR) 10 MG tablet, Take 1 tablet by mouth Every Night., Disp: 90 tablet, Rfl: 1  •  carvedilol (COREG) 12.5 MG tablet, Take 1 tablet by mouth 2 (Two) Times a Day With Meals., Disp: 180 tablet, Rfl: 1  •  FLUoxetine (PROzac) 20 MG tablet, Take 1 tablet by mouth Daily., Disp: 90 tablet, Rfl: 1  •  furosemide (LASIX) 40 MG tablet, Take 1 tablet by mouth Daily., Disp: 30 tablet, Rfl: 5  •  sacubitril-valsartan (Entresto) 24-26 MG tablet, Take 1 tablet by mouth Every 12 (Twelve) Hours., Disp: 180 tablet, Rfl: 1  •   "spironolactone (ALDACTONE) 25 MG tablet, Take 1 tablet by mouth Daily., Disp: 90 tablet, Rfl: 1        History of Present Illness    Patient returns today for 6-month follow-up of nonischemic cardiomyopathy and device check.  Since her last visit the patient overall she is doing very well.  He is active, denies any angina shortness of breath orthopnea PND claudication tachypalpitations presyncope or syncope.    The following portions of the patient's history were reviewed and updated as appropriate: allergies, current medications, past family history, past medical history, past social history, past surgical history and problem list.      Social History     Tobacco Use   • Smoking status: Former Smoker     Packs/day: 2.00     Years: 30.00     Pack years: 60.00     Quit date: 1999     Years since quittin.6   • Smokeless tobacco: Never Used   Substance Use Topics   • Alcohol use: Yes     Comment: 2 beers per day   • Drug use: No         ROS       Objective:   /64   Pulse 70   Ht 177.8 cm (70\")   Wt 98 kg (216 lb)   SpO2 95%   BMI 30.99 kg/m²         Vitals reviewed.   Constitutional:       Appearance: Well-developed and not in distress.   Neck:      Thyroid: No thyromegaly.      Vascular: No carotid bruit or JVD.   Pulmonary:      Breath sounds: Normal breath sounds.   Cardiovascular:      Regular rhythm.      No gallop. No S3 and S4 gallop.   Abdominal:      General: Bowel sounds are normal.      Palpations: Abdomen is soft. There is no abdominal mass.      Tenderness: There is no abdominal tenderness.   Musculoskeletal:         General: No deformity.      Extremities: No clubbing present.Skin:     General: Skin is warm and dry.      Findings: No rash.   Neurological:      Mental Status: Alert and oriented to person, place, and time.         Procedures  Device check:  Atrially paced 27%.  Biventricular paced 95%.  Normal thresholds impedances.  No arrhythmias.  Normal charge time.      "   Assessment:   Assessment/Plan      Diagnoses and all orders for this visit:    1. Cardiomyopathy, unspecified type (CMS/HCC) (Primary)    2. Essential hypertension    3. Pure hypercholesterolemia      1.  Nonischemic cardiomyopathy.  Currently no heart failure.  Last LV 20 EF 25 to 30% euvolemic  2.  Normal functioning biventricular ICD.  3.  Hypertension currently well controlled on Entresto and beta-blocker  4.  Dyslipidemia well-controlled on moderate intensity statin    Recommendations  1.  Patient is stable make no change in current medical therapy  2.  Revisit in 6 months with device check    Pb Naqvi MD             Dictated utilizing Dragon dictation

## 2021-04-01 PROCEDURE — 93295 DEV INTERROG REMOTE 1/2/MLT: CPT | Performed by: INTERNAL MEDICINE

## 2021-04-01 PROCEDURE — 93296 REM INTERROG EVL PM/IDS: CPT | Performed by: INTERNAL MEDICINE

## 2021-04-15 RX ORDER — FUROSEMIDE 40 MG/1
TABLET ORAL
Qty: 30 TABLET | Refills: 0 | Status: SHIPPED | OUTPATIENT
Start: 2021-04-15 | End: 2021-06-01

## 2021-04-29 ENCOUNTER — TELEPHONE (OUTPATIENT)
Dept: CARDIOLOGY | Facility: CLINIC | Age: 61
End: 2021-04-29

## 2021-06-01 RX ORDER — FUROSEMIDE 40 MG/1
TABLET ORAL
Qty: 30 TABLET | Refills: 0 | Status: SHIPPED | OUTPATIENT
Start: 2021-06-01 | End: 2021-07-15

## 2021-06-14 DIAGNOSIS — I50.22 CHRONIC SYSTOLIC CONGESTIVE HEART FAILURE (HCC): ICD-10-CM

## 2021-06-14 DIAGNOSIS — I10 ESSENTIAL HYPERTENSION: ICD-10-CM

## 2021-06-14 RX ORDER — SPIRONOLACTONE 25 MG/1
TABLET ORAL
Qty: 90 TABLET | Refills: 0 | Status: SHIPPED | OUTPATIENT
Start: 2021-06-14 | End: 2021-10-08 | Stop reason: SDUPTHER

## 2021-07-01 PROCEDURE — 93296 REM INTERROG EVL PM/IDS: CPT | Performed by: INTERNAL MEDICINE

## 2021-07-01 PROCEDURE — 93295 DEV INTERROG REMOTE 1/2/MLT: CPT | Performed by: INTERNAL MEDICINE

## 2021-07-15 RX ORDER — FUROSEMIDE 40 MG/1
TABLET ORAL
Qty: 30 TABLET | Refills: 0 | Status: SHIPPED | OUTPATIENT
Start: 2021-07-15 | End: 2021-08-27

## 2021-08-11 DIAGNOSIS — I10 ESSENTIAL HYPERTENSION: ICD-10-CM

## 2021-08-11 DIAGNOSIS — I50.22 CHRONIC SYSTOLIC CONGESTIVE HEART FAILURE (HCC): ICD-10-CM

## 2021-08-11 RX ORDER — CARVEDILOL 12.5 MG/1
TABLET ORAL
Qty: 180 TABLET | Refills: 0 | Status: SHIPPED | OUTPATIENT
Start: 2021-08-11 | End: 2021-08-25

## 2021-08-21 ENCOUNTER — HOSPITAL ENCOUNTER (EMERGENCY)
Facility: HOSPITAL | Age: 61
Discharge: HOME | End: 2021-08-22
Attending: EMERGENCY MEDICINE | Admitting: EMERGENCY MEDICINE

## 2021-08-21 ENCOUNTER — APPOINTMENT (OUTPATIENT)
Dept: GENERAL RADIOLOGY | Facility: HOSPITAL | Age: 61
End: 2021-08-21

## 2021-08-21 VITALS
HEART RATE: 76 BPM | DIASTOLIC BLOOD PRESSURE: 78 MMHG | SYSTOLIC BLOOD PRESSURE: 126 MMHG | WEIGHT: 225 LBS | RESPIRATION RATE: 18 BRPM | TEMPERATURE: 99 F | BODY MASS INDEX: 32.21 KG/M2 | OXYGEN SATURATION: 95 % | HEIGHT: 70 IN

## 2021-08-21 DIAGNOSIS — R00.0 TACHYCARDIA: ICD-10-CM

## 2021-08-21 DIAGNOSIS — Z45.018 ENCOUNTER FOR INTERROGATION OF CARDIAC PACEMAKER: Primary | ICD-10-CM

## 2021-08-21 DIAGNOSIS — R42 DIZZINESS: ICD-10-CM

## 2021-08-21 DIAGNOSIS — R42 LIGHT HEADED: ICD-10-CM

## 2021-08-21 LAB
ALBUMIN SERPL-MCNC: 4.4 G/DL (ref 3.5–5.2)
ALBUMIN/GLOB SERPL: 1.5 G/DL
ALP SERPL-CCNC: 76 U/L (ref 39–117)
ALT SERPL W P-5'-P-CCNC: 17 U/L (ref 1–41)
ANION GAP SERPL CALCULATED.3IONS-SCNC: 12 MMOL/L (ref 5–15)
AST SERPL-CCNC: 22 U/L (ref 1–40)
BASOPHILS # BLD AUTO: 0.08 10*3/MM3 (ref 0–0.2)
BASOPHILS NFR BLD AUTO: 0.9 % (ref 0–1.5)
BILIRUB SERPL-MCNC: 0.5 MG/DL (ref 0–1.2)
BUN SERPL-MCNC: 15 MG/DL (ref 8–23)
BUN/CREAT SERPL: 16.1 (ref 7–25)
CALCIUM SPEC-SCNC: 9.5 MG/DL (ref 8.6–10.5)
CHLORIDE SERPL-SCNC: 104 MMOL/L (ref 98–107)
CO2 SERPL-SCNC: 24 MMOL/L (ref 22–29)
CREAT SERPL-MCNC: 0.93 MG/DL (ref 0.76–1.27)
DEPRECATED RDW RBC AUTO: 40.5 FL (ref 37–54)
EOSINOPHIL # BLD AUTO: 0.27 10*3/MM3 (ref 0–0.4)
EOSINOPHIL NFR BLD AUTO: 3 % (ref 0.3–6.2)
ERYTHROCYTE [DISTWIDTH] IN BLOOD BY AUTOMATED COUNT: 12.2 % (ref 12.3–15.4)
GFR SERPL CREATININE-BSD FRML MDRD: 83 ML/MIN/1.73
GLOBULIN UR ELPH-MCNC: 2.9 GM/DL
GLUCOSE SERPL-MCNC: 108 MG/DL (ref 65–99)
HCT VFR BLD AUTO: 43 % (ref 37.5–51)
HGB BLD-MCNC: 14.6 G/DL (ref 13–17.7)
HOLD SPECIMEN: NORMAL
HOLD SPECIMEN: NORMAL
IMM GRANULOCYTES # BLD AUTO: 0.02 10*3/MM3 (ref 0–0.05)
IMM GRANULOCYTES NFR BLD AUTO: 0.2 % (ref 0–0.5)
LYMPHOCYTES # BLD AUTO: 2.21 10*3/MM3 (ref 0.7–3.1)
LYMPHOCYTES NFR BLD AUTO: 24.9 % (ref 19.6–45.3)
MAGNESIUM SERPL-MCNC: 1.9 MG/DL (ref 1.6–2.4)
MCH RBC QN AUTO: 30.9 PG (ref 26.6–33)
MCHC RBC AUTO-ENTMCNC: 34 G/DL (ref 31.5–35.7)
MCV RBC AUTO: 90.9 FL (ref 79–97)
MONOCYTES # BLD AUTO: 1 10*3/MM3 (ref 0.1–0.9)
MONOCYTES NFR BLD AUTO: 11.2 % (ref 5–12)
NEUTROPHILS NFR BLD AUTO: 5.31 10*3/MM3 (ref 1.7–7)
NEUTROPHILS NFR BLD AUTO: 59.8 % (ref 42.7–76)
NRBC BLD AUTO-RTO: 0 /100 WBC (ref 0–0.2)
NT-PROBNP SERPL-MCNC: 251.1 PG/ML (ref 0–900)
PLATELET # BLD AUTO: 250 10*3/MM3 (ref 140–450)
PMV BLD AUTO: 9.8 FL (ref 6–12)
POTASSIUM SERPL-SCNC: 3.9 MMOL/L (ref 3.5–5.2)
PROT SERPL-MCNC: 7.3 G/DL (ref 6–8.5)
RBC # BLD AUTO: 4.73 10*6/MM3 (ref 4.14–5.8)
SODIUM SERPL-SCNC: 140 MMOL/L (ref 136–145)
TROPONIN T SERPL-MCNC: <0.01 NG/ML (ref 0–0.03)
TSH SERPL DL<=0.05 MIU/L-ACNC: 4.28 UIU/ML (ref 0.27–4.2)
WBC # BLD AUTO: 8.89 10*3/MM3 (ref 3.4–10.8)
WHOLE BLOOD HOLD SPECIMEN: NORMAL

## 2021-08-21 PROCEDURE — 93005 ELECTROCARDIOGRAM TRACING: CPT | Performed by: EMERGENCY MEDICINE

## 2021-08-21 PROCEDURE — 84443 ASSAY THYROID STIM HORMONE: CPT | Performed by: EMERGENCY MEDICINE

## 2021-08-21 PROCEDURE — 85025 COMPLETE CBC W/AUTO DIFF WBC: CPT | Performed by: EMERGENCY MEDICINE

## 2021-08-21 PROCEDURE — 84484 ASSAY OF TROPONIN QUANT: CPT | Performed by: EMERGENCY MEDICINE

## 2021-08-21 PROCEDURE — 84484 ASSAY OF TROPONIN QUANT: CPT | Performed by: PHYSICIAN ASSISTANT

## 2021-08-21 PROCEDURE — 71045 X-RAY EXAM CHEST 1 VIEW: CPT

## 2021-08-21 PROCEDURE — 80053 COMPREHEN METABOLIC PANEL: CPT | Performed by: EMERGENCY MEDICINE

## 2021-08-21 PROCEDURE — 83880 ASSAY OF NATRIURETIC PEPTIDE: CPT | Performed by: EMERGENCY MEDICINE

## 2021-08-21 PROCEDURE — 99284 EMERGENCY DEPT VISIT MOD MDM: CPT

## 2021-08-21 PROCEDURE — 83735 ASSAY OF MAGNESIUM: CPT | Performed by: EMERGENCY MEDICINE

## 2021-08-21 RX ORDER — SODIUM CHLORIDE 0.9 % (FLUSH) 0.9 %
10 SYRINGE (ML) INJECTION AS NEEDED
Status: DISCONTINUED | OUTPATIENT
Start: 2021-08-21 | End: 2021-08-22 | Stop reason: HOSPADM

## 2021-08-22 LAB
HOLD SPECIMEN: NORMAL
TROPONIN T SERPL-MCNC: <0.01 NG/ML (ref 0–0.03)

## 2021-08-22 NOTE — ED PROVIDER NOTES
"Subjective   Patient is a 61-year-old male presents the emergency room today after having been shocked by his pacemaker..  Patient reports that he was eating at [employer] with a friend.  They were enjoying dinner and having conversation when he started to feel lightheaded and dizzy.  Patient states that he had a similar feeling approximately 2 years ago when his pacemaker also shocked him.  Patient has the device for what he refers to as a \"slushy\" heart.  Patient denies anything specific tonight that seems to have caused his symptoms.  He states he was doing nothing strenuous.  He does share that he had some sweating before and after the event.  Patient follows with Dr. Naqvi.  He also states that his symptoms of feeling dizzy and lightheaded have resolved.  No additional complaints on exam.          Review of Systems   Constitutional: Positive for diaphoresis.   HENT: Negative.    Respiratory: Negative.    Cardiovascular: Negative.    Gastrointestinal: Negative.    Musculoskeletal: Negative.    Skin: Negative.    Neurological: Positive for dizziness and light-headedness.       Past Medical History:   Diagnosis Date   • Anxiety    • Arthritis    • Chest pain    • CHF (congestive heart failure) (CMS/HCC)     Chronic systolic   • Dyspnea on effort    • Fatigue    • Hyperlipidemia    • Hypertension    • Irritability    • LBBB (left bundle branch block)    • Left bundle branch block    • Lower back pain    • Lower leg edema    • Nonischemic cardiomyopathy (CMS/HCC)    • PVC (premature ventricular contraction)    • Shortness of breath    • URTI (acute upper respiratory infection)        No Known Allergies    Past Surgical History:   Procedure Laterality Date   • ADENOIDECTOMY     • ANTERIOR CERVICAL DISCECTOMY W/ FUSION N/A 9/19/2018    Procedure: ANTERIOR CERVICAL DECOMPRESSION WITH FUSION C4-5 RIGHT;  Surgeon: Israel Garcia MD;  Location: [facility];  Service: Orthopedic Spine   • CARDIAC CATHETERIZATION  2017    X2 "   • CARDIAC DEFIBRILLATOR PLACEMENT      [facility] biventricular ICD 2015   • ELBOW PROCEDURE Right 1973   • PACEMAKER IMPLANTATION  2015    ICD, PLACED PER RUKAVINA AND NOW F/U WITH CONSTANTINO    • TONSILLECTOMY     • TOTAL KNEE ARTHROPLASTY Left 10/2017       Family History   Problem Relation Age of Onset   • COPD Mother    • Emphysema Mother    • Diabetes Mother    • COPD Father    • Other Father         black lung        Social History     Socioeconomic History   • Marital status:      Spouse name: Not on file   • Number of children: Not on file   • Years of education: Not on file   • Highest education level: Not on file   Tobacco Use   • Smoking status: Former Smoker     Packs/day: 2.00     Years: 30.00     Pack years: 60.00     Quit date: 1999     Years since quittin.0   • Smokeless tobacco: Never Used   Substance and Sexual Activity   • Alcohol use: Yes     Comment: 2 beers per day   • Drug use: No   • Sexual activity: Defer     Partners: Female     Comment:            Objective   Physical Exam  Vitals and nursing note reviewed.   Constitutional:       General: He is not in acute distress.     Appearance: Normal appearance. He is not ill-appearing or toxic-appearing.   HENT:      Head: Normocephalic and atraumatic.      Nose: Nose normal.      Mouth/Throat:      Mouth: Mucous membranes are moist.   Eyes:      Extraocular Movements: Extraocular movements intact.      Conjunctiva/sclera: Conjunctivae normal.   Cardiovascular:      Rate and Rhythm: Normal rate.      Pulses: Normal pulses.      Heart sounds: Normal heart sounds.   Pulmonary:      Effort: Pulmonary effort is normal. No respiratory distress.   Musculoskeletal:         General: Normal range of motion.      Cervical back: Normal range of motion.   Skin:     General: Skin is warm and dry.   Neurological:      General: No focal deficit present.      Mental Status: He is alert.   Psychiatric:         Mood and Affect: Mood normal.          Behavior: Behavior normal.         Thought Content: Thought content normal.         Judgment: Judgment normal.         Procedures           ED Course  ED Course as of Aug 22 0052   Sat Aug 21, 2021   2313 Troponin T: <0.010 [RS]   2318 Cardiology paged    [JG]   6354 Spoke with Tk Kwon with cardiology and reviewed case. Patient is to call office on Monday morning for follow up. Device performed its function and patient can follow up in clinic.     [JG]   Sun Aug 22, 2021   0048 Patient presents to ED for evaluation after being shocked by his pacemaker while eating dinner this evening. Patient's [employer] pacemaker was interrogated in the emergency department. With an event described at the same time frame as patient where he had a episode of elevated heart rate that was not initially corrected and patient was delivered a shock after which the rhythm was corrected. No additional acute or emergent findings on physical exam or labs. Initial repeat troponin negative. No acute changes on EKG. Chest x-ray without acute cardiopulmonary process. Cardiology consulted and recommended patient could follow-up in office on Monday morning. Patient is agreeable to plan of care of outpatient follow-up, given return precautions and showed understanding. At time of discharge disposition patient was resting comfortably no acute distress, vital signs stable and maintaining oxygen at 95% on room air.    [JG]      ED Course User Index  [JG] Aren Bryson PA  [RS] Thompson Esparza MD      Recent Results (from the past 24 hour(s))   Comprehensive Metabolic Panel    Collection Time: 08/21/21  9:02 PM    Specimen: Blood   Result Value Ref Range    Glucose 108 (H) 65 - 99 mg/dL    BUN 15 8 - 23 mg/dL    Creatinine 0.93 0.76 - 1.27 mg/dL    Sodium 140 136 - 145 mmol/L    Potassium 3.9 3.5 - 5.2 mmol/L    Chloride 104 98 - 107 mmol/L    CO2 24.0 22.0 - 29.0 mmol/L    Calcium 9.5 8.6 - 10.5 mg/dL    Total Protein 7.3 6.0  - 8.5 g/dL    Albumin 4.40 3.50 - 5.20 g/dL    ALT (SGPT) 17 1 - 41 U/L    AST (SGOT) 22 1 - 40 U/L    Alkaline Phosphatase 76 39 - 117 U/L    Total Bilirubin 0.5 0.0 - 1.2 mg/dL    eGFR Non African Amer 83 >60 mL/min/1.73    Globulin 2.9 gm/dL    A/G Ratio 1.5 g/dL    BUN/Creatinine Ratio 16.1 7.0 - 25.0    Anion Gap 12.0 5.0 - 15.0 mmol/L   Magnesium    Collection Time: 08/21/21  9:02 PM    Specimen: Blood   Result Value Ref Range    Magnesium 1.9 1.6 - 2.4 mg/dL   Troponin    Collection Time: 08/21/21  9:02 PM    Specimen: Blood   Result Value Ref Range    Troponin T <0.010 0.000 - 0.030 ng/mL   TSH    Collection Time: 08/21/21  9:02 PM    Specimen: Blood   Result Value Ref Range    TSH 4.280 (H) 0.270 - 4.200 uIU/mL   BNP    Collection Time: 08/21/21  9:02 PM    Specimen: Blood   Result Value Ref Range    proBNP 251.1 0.0 - 900.0 pg/mL   Green Top (Gel)    Collection Time: 08/21/21  9:02 PM   Result Value Ref Range    Extra Tube Hold for add-ons.    Lavender Top    Collection Time: 08/21/21  9:02 PM   Result Value Ref Range    Extra Tube hold for add-on    Gold Top - SST    Collection Time: 08/21/21  9:02 PM   Result Value Ref Range    Extra Tube Hold for add-ons.    CBC Auto Differential    Collection Time: 08/21/21  9:02 PM    Specimen: Blood   Result Value Ref Range    WBC 8.89 3.40 - 10.80 10*3/mm3    RBC 4.73 4.14 - 5.80 10*6/mm3    Hemoglobin 14.6 13.0 - 17.7 g/dL    Hematocrit 43.0 37.5 - 51.0 %    MCV 90.9 79.0 - 97.0 fL    MCH 30.9 26.6 - 33.0 pg    MCHC 34.0 31.5 - 35.7 g/dL    RDW 12.2 (L) 12.3 - 15.4 %    RDW-SD 40.5 37.0 - 54.0 fl    MPV 9.8 6.0 - 12.0 fL    Platelets 250 140 - 450 10*3/mm3    Neutrophil % 59.8 42.7 - 76.0 %    Lymphocyte % 24.9 19.6 - 45.3 %    Monocyte % 11.2 5.0 - 12.0 %    Eosinophil % 3.0 0.3 - 6.2 %    Basophil % 0.9 0.0 - 1.5 %    Immature Grans % 0.2 0.0 - 0.5 %    Neutrophils, Absolute 5.31 1.70 - 7.00 10*3/mm3    Lymphocytes, Absolute 2.21 0.70 - 3.10 10*3/mm3     "Monocytes, Absolute 1.00 (H) 0.10 - 0.90 10*3/mm3    Eosinophils, Absolute 0.27 0.00 - 0.40 10*3/mm3    Basophils, Absolute 0.08 0.00 - 0.20 10*3/mm3    Immature Grans, Absolute 0.02 0.00 - 0.05 10*3/mm3    nRBC 0.0 0.0 - 0.2 /100 WBC   Troponin    Collection Time: 08/21/21 11:47 PM    Specimen: Blood   Result Value Ref Range    Troponin T <0.010 0.000 - 0.030 ng/mL     Note: In addition to lab results from this visit, the labs listed above may include labs taken at another facility or during a different encounter within the last 24 hours. Please correlate lab times with ED admission and discharge times for further clarification of the services performed during this visit.    XR Chest 1 View   Final Result   No acute cardiopulmonary findings.      Signer Name: Robin Ribeiro MD    Signed: 8/21/2021 9:55 PM    Workstation Name: [facility]     [facility]        Vitals:    08/21/21 2051 08/21/21 2059 08/21/21 2104 08/21/21 2300   BP: 140/81  134/74 126/78   BP Location: Right arm      Patient Position: Sitting      Pulse: 97  76    Resp:  18     Temp:  99 °F (37.2 °C)     TempSrc:  Oral     SpO2: 94%  95% 95%   Weight:  102 kg (225 lb)     Height:  177.8 cm (70\")       Medications   sodium chloride 0.9 % flush 10 mL (has no administration in time range)     ECG/EMG Results (last 24 hours)     Procedure Component Value Units Date/Time    ECG 12 Lead [[id number]] Collected: 08/21/21 2053     Updated: 08/21/21 2054        ECG 12 Lead                                                MDM  Number of Diagnoses or Management Options  Dizziness: new and requires workup  Encounter for interrogation of cardiac pacemaker: new and requires workup  Light headed: new and requires workup  Tachycardia: new and requires workup     Amount and/or Complexity of Data Reviewed  Clinical lab tests: reviewed    Risk of Complications, Morbidity, and/or Mortality  Presenting problems: moderate  Diagnostic procedures: " moderate  Management options: moderate    Patient Progress  Patient progress: improved      Final diagnoses:   Encounter for interrogation of cardiac pacemaker   Light headed   Dizziness   Tachycardia       ED Disposition  ED Disposition     ED Disposition Condition Comment    Discharge Stable           Sundeep Stone MD  [address]  [facility] [address]  [phone]    Call   As needed    Pb Naqvi MD  [address] [facility] [facility]  [address]  [place] [address]  [phone]    On 8/23/2021  Call for follow-up appointment with cardiology on Monday morning    [facility] Emergency Department  [address] [facility] [address] [phone]  Go to   If symptoms worsen         Medication List      No changes were made to your prescriptions during this visit.          Aren Bryson, PA  08/22/21 0052

## 2021-08-23 ENCOUNTER — TELEPHONE (OUTPATIENT)
Dept: CARDIOLOGY | Facility: CLINIC | Age: 61
End: 2021-08-23

## 2021-08-23 ENCOUNTER — TELEPHONE (OUTPATIENT)
Dept: FAMILY MEDICINE CLINIC | Facility: CLINIC | Age: 61
End: 2021-08-23

## 2021-08-23 NOTE — TELEPHONE ENCOUNTER
Caller: Arash Simmons    Relationship: Self    Best call back number: [phone]    What is the best time to reach you: ASAP    Who are you requesting to speak with (clinical staff, provider,  specific staff member): PROVIDER    Do you know the name of the person who called: SELF    What was the call regarding: PATIENT STATES THAT HE GOT A PACE MAKER PUT IN. PATIENT STATES THAT IT STOPPED WORKING AND HE HAD TO GO TO THE [facility] E. SATURDAY EVENING.    Do you require a callback: YES

## 2021-08-23 NOTE — TELEPHONE ENCOUNTER
Called to check in with Mr Simmons regarding his AICD shock he received on Saturday.  He was evaluated a [facility]. Message sent to Dr Naqvi and Saloni Vail.  [place] [facility] [place].  Left message on voicemail explaining reading was received and waiting for Dr Naqvi to review.

## 2021-08-24 ENCOUNTER — TELEPHONE (OUTPATIENT)
Dept: CARDIOLOGY | Facility: CLINIC | Age: 61
End: 2021-08-24

## 2021-08-24 DIAGNOSIS — I50.22 CHRONIC SYSTOLIC CONGESTIVE HEART FAILURE (HCC): Primary | ICD-10-CM

## 2021-08-24 DIAGNOSIS — I47.1 PAROXYSMAL SVT (SUPRAVENTRICULAR TACHYCARDIA) (HCC): ICD-10-CM

## 2021-08-24 DIAGNOSIS — I42.9 CARDIOMYOPATHY, UNSPECIFIED TYPE (HCC): ICD-10-CM

## 2021-08-24 NOTE — TELEPHONE ENCOUNTER
Spoke with patient, advsammed of below.  Told him I have sent a message to see if we need to see him on a non clinic day and will let him know when Dr. Naqvi responds.        ----- Message from Pb Naqvi MD sent at 8/23/2021  6:29 PM EDT -----  Patient with another ICD shock due to rapid A. fib.  Needs an echo.  Needs a revisit to see me or Saloni GOMES.  If can be seen in the next 2 weeks, did not change medical therapy.  If however his revisit is pushed out a while, please do the following.1.  Decrease carvedilol to 6.25 mg twice daily.2.  Start sotalol 80 mg twice daily and have EKG done on day #3.3.  Increase spironolactone to 50 mg daily    thx

## 2021-08-24 NOTE — PROGRESS NOTES
Subjective:     Encounter Date:08/25/2021    Primary Care Physician: Sundeep Stone MD      Patient ID: Arash Simmons is a 61 y.o. male.    Chief Complaint:Follow-up and Pacemaker Check    PROBLEM LIST:  1. Nonischemic cardiomyopathy:  a.  On 04/07/2015, abnormal myocardial perfusion study with evidence of dilated cardiomyopathy, ejection fraction of 16%, large fixed perfusion defect in the anterior apex, consistent with prior myocardial infarction.   b. On 05/08/2015, cardiac catheterization  with EF of 10% to 15%.  Normal coronary arteries.  Severe left ventricular dilatation.    c. Echo, 07/08/2015, LVEF 20%.   d. Status post biventricular ICD placement, August 2015.  e. 9/2020 echo EF 25-30% mild AR. Mild to mod MR. Read by Dr. Orozco.  2. Biventricular ICD, [employer], 8/2015  a. ICD shock 7/3/2020 due to SVT/atrial tachycardia at 220 bpm  b. ICD shock 8/21 due to atrial tachycardia with one-to-one conduction.  (Not A. fib)  3.  Left bundle branch block.   4. Hypertension.   5. Dyslipidemia.   6. Anxiety.   7. Cervical spine  8. Questionable sleep apnea.   9. Left knee replacement 2017  10. Abdominal/intestinal surgery as a child.  11. Elbow surgery  12. Tonsillectomy and adenoidectomy  13. Left knee replacement        No Known Allergies      Current Outpatient Medications:   •  acetaminophen (TYLENOL) 500 MG tablet, Take 500 mg by mouth Every 6 (Six) Hours As Needed for Mild Pain ., Disp: , Rfl:   •  aspirin 81 MG tablet, Take 81 mg by mouth Daily., Disp: , Rfl:   •  atorvastatin (LIPITOR) 10 MG tablet, Take 1 tablet by mouth Every Night., Disp: 90 tablet, Rfl: 1  •  carvedilol (COREG) 12.5 MG tablet, TAKE 1 TABLET BY MOUTH TWICE DAILY WITH MEALS, Disp: 180 tablet, Rfl: 0  •  FLUoxetine (PROzac) 20 MG tablet, Take 1 tablet by mouth Daily., Disp: 90 tablet, Rfl: 1  •  furosemide (LASIX) 40 MG tablet, Take 1 tablet by mouth once daily, Disp: 30 tablet, Rfl: 0  •  sacubitril-valsartan (Entresto) 24-26  "MG tablet, Take 1 tablet by mouth Every 12 (Twelve) Hours., Disp: 180 tablet, Rfl: 1  •  spironolactone (ALDACTONE) 25 MG tablet, Take 1 tablet by mouth once daily, Disp: 90 tablet, Rfl: 0  •  vitamin D3 125 MCG (5000 UT) capsule capsule, Take 5,000 Units by mouth Daily., Disp: , Rfl:         History of Present Illness    Patient returns today for follow-up of inappropriate ICD shock.  Patient has been very active, no exertional symptoms orthopnea PND or any cardiovascular complaints.  He was at dinner Mexican food and drinking beer when he had the sudden onset of dizziness, nausea lightheadedness felt like he was in a pass out.  This was associate with tachypalpitations.  He subsequently underwent ICD shock.  He notes this is identical to his previous inappropriate shock last year.  Other than this episode he feels well and has no intermittent tachypalpitations.    The following portions of the patient's history were reviewed and updated as appropriate: allergies, current medications, past family history, past medical history, past social history, past surgical history and problem list.      Social History     Tobacco Use   • Smoking status: Former Smoker     Packs/day: 2.00     Years: 30.00     Pack years: 60.00     Quit date: 1999     Years since quittin.0   • Smokeless tobacco: Never Used   Substance Use Topics   • Alcohol use: Yes     Comment: 2 beers per day   • Drug use: No         ROS       Objective:   /70   Pulse 70   Ht 177.8 cm (70\")   Wt 98.4 kg (217 lb)   SpO2 96%   BMI 31.14 kg/m²         Vitals reviewed.   Constitutional:       Appearance: Well-developed and not in distress.   Neck:      Thyroid: No thyromegaly.      Vascular: No carotid bruit or JVD.   Pulmonary:      Breath sounds: Normal breath sounds.   Cardiovascular:      Regular rhythm.      No gallop. No S3 and S4 gallop.   Abdominal:      General: Bowel sounds are normal.      Palpations: Abdomen is soft. There is no " abdominal mass.      Tenderness: There is no abdominal tenderness.   Musculoskeletal:         General: No deformity.      Extremities: No clubbing present.Skin:     General: Skin is warm and dry.      Findings: No rash.   Neurological:      Mental Status: Alert and oriented to person, place, and time.         Procedures  Device check:  29% atrially paced 90% biventricular pacing.  Estimated 3 years of battery life.  Normal thresholds impedances.  The strips of the ICD shock were interrogated which shows an atrial tachycardia with one-to-one conduction at 230 to 250 bpm.        Assessment:   Assessment/Plan      Diagnoses and all orders for this visit:    1. Paroxysmal SVT (supraventricular tachycardia) (CMS/HCC) (Primary)    2. Cardiomyopathy, unspecified type (CMS/HCC)    3. Essential hypertension      1.  Inappropriate ICD shock due to atrial tachycardia one-to-one conduction  2.  Atrial tachycardia one-to-one conduction, second episode in a year.  3.  Nonischemic cardiomyopathy last echo showed LVEF of 30%.  Currently euvolemic and no evidence of heart failure.  Functional class I  4.  Normal functioning BiV ICD  5.  History of hypertension currently resolved/well-controlled on Entresto and beta-blocker    Recommendations:  1.  Discussed options of EP ablation versus medical therapy with patient.  As this is a second event, highly symptomatic, we both agree that  management would be inappropriate at this time.  2.  Discontinue carvedilol  3.  Initiate sotalol 80 mg twice daily  4.  EKG Friday (after taking fifth dose)  5.  We will schedule outpatient referral to electrophysiology to consider EP study/ablation of his atrial tachycardia    Pb Naqvi MD  .         Dictated utilizing Dragon dictation

## 2021-08-25 ENCOUNTER — OFFICE VISIT (OUTPATIENT)
Dept: CARDIOLOGY | Facility: CLINIC | Age: 61
End: 2021-08-25

## 2021-08-25 ENCOUNTER — TELEPHONE (OUTPATIENT)
Dept: FAMILY MEDICINE CLINIC | Facility: CLINIC | Age: 61
End: 2021-08-25

## 2021-08-25 VITALS
OXYGEN SATURATION: 96 % | BODY MASS INDEX: 31.07 KG/M2 | DIASTOLIC BLOOD PRESSURE: 70 MMHG | SYSTOLIC BLOOD PRESSURE: 106 MMHG | HEIGHT: 70 IN | WEIGHT: 217 LBS | HEART RATE: 70 BPM

## 2021-08-25 DIAGNOSIS — I50.22 CHRONIC SYSTOLIC CONGESTIVE HEART FAILURE (HCC): ICD-10-CM

## 2021-08-25 DIAGNOSIS — I47.1 PAROXYSMAL SVT (SUPRAVENTRICULAR TACHYCARDIA) (HCC): Primary | ICD-10-CM

## 2021-08-25 DIAGNOSIS — I42.9 CARDIOMYOPATHY, UNSPECIFIED TYPE (HCC): ICD-10-CM

## 2021-08-25 DIAGNOSIS — I44.7 LBBB (LEFT BUNDLE BRANCH BLOCK): ICD-10-CM

## 2021-08-25 DIAGNOSIS — I10 ESSENTIAL HYPERTENSION: ICD-10-CM

## 2021-08-25 PROCEDURE — 93284 PRGRMG EVAL IMPLANTABLE DFB: CPT | Performed by: INTERNAL MEDICINE

## 2021-08-25 PROCEDURE — 99214 OFFICE O/P EST MOD 30 MIN: CPT | Performed by: INTERNAL MEDICINE

## 2021-08-25 NOTE — TELEPHONE ENCOUNTER
DR DELGADO, CARDIOLOGY, WAS GOING TO SEND AN ORDER FOR AN EKG TO BE DONE IN OUR OFFICE Friday.  PATIENT WOULD LIKE PHONE CALL WHEN ORDER RECEIVED TO LET HIM KNOW THE BEST TIME TO COME BY.     PLEASE CALL [phone]

## 2021-08-26 NOTE — TELEPHONE ENCOUNTER
Rx Refill Note  Requested Prescriptions     Pending Prescriptions Disp Refills   • furosemide (LASIX) 40 MG tablet [Pharmacy Med Name: Furosemide 40 MG Oral Tablet] 30 tablet 0     Sig: Take 1 tablet by mouth once daily      Last office visit with prescribing clinician: Visit date not found      Next office visit with prescribing clinician: Visit date not found            Shannon Leonardo  08/26/21, 15:24 EDT

## 2021-08-27 LAB
QT INTERVAL: 392 MS
QTC INTERVAL: 474 MS

## 2021-08-27 RX ORDER — FUROSEMIDE 40 MG/1
TABLET ORAL
Qty: 30 TABLET | Refills: 0 | Status: SHIPPED | OUTPATIENT
Start: 2021-08-27 | End: 2021-10-04

## 2021-08-30 NOTE — TELEPHONE ENCOUNTER
I do not see a faxed order however according to last note from cardiology they wanted EKG repeated. Ok to do this on a walk-in basis?

## 2021-08-30 NOTE — TELEPHONE ENCOUNTER
PATIENT CALLED BACK TO CHECK THE STATUS OF THE EKG REFERRAL THAT DR. MEEKS HAD SENT OVER FOR DR. CONNELL TO PERFORM THE EKG.       PATIENT CONTACT : [phone] (H)    PATIENT IS REQUESTING A CALL BACK WITH AN APPOINTMENT HE PREFERS [facility] DUE TO WORK SCHEDULE. HE IS OFF ON [facility]IDAYS.       PATIENT HAS BEEN ADVISED TO PLEASE ALLOW 48 HOURS FOR OUR CLINICAL TEAM WILL FOLLOW UP ON THIS REQUEST.

## 2021-08-31 ENCOUNTER — TELEPHONE (OUTPATIENT)
Dept: CARDIOLOGY | Facility: CLINIC | Age: 61
End: 2021-08-31

## 2021-08-31 NOTE — TELEPHONE ENCOUNTER
Patient called to report he has been unable to get EKG done at dr. Stone's office.  Advised order was placed 8/25/21 for the sotalol intimation.  He called their office same day order was placed to ask when could come on Friday but did not receive a call back.  He called again this week and finally someone called him yesterday and told him he could come this Friday.  Advised patient that it was important to have gotten the EKG on Friday after 5th dose of sotalol and he replies that he tried but was unsuccessful.  He reports felling dizzy and light headed the last couple of days and has some nausea so he is asking if could be new medication.   Please advise if needs EKG ASAP, he said can do tomorrow, and directions for medication.

## 2021-09-13 ENCOUNTER — OFFICE VISIT (OUTPATIENT)
Dept: CARDIOLOGY | Facility: CLINIC | Age: 61
End: 2021-09-13

## 2021-09-13 VITALS
HEART RATE: 70 BPM | HEIGHT: 70 IN | SYSTOLIC BLOOD PRESSURE: 118 MMHG | BODY MASS INDEX: 30.21 KG/M2 | WEIGHT: 211 LBS | DIASTOLIC BLOOD PRESSURE: 62 MMHG | OXYGEN SATURATION: 96 %

## 2021-09-13 DIAGNOSIS — I44.7 LBBB (LEFT BUNDLE BRANCH BLOCK): ICD-10-CM

## 2021-09-13 DIAGNOSIS — I47.1 PAROXYSMAL SVT (SUPRAVENTRICULAR TACHYCARDIA) (HCC): ICD-10-CM

## 2021-09-13 DIAGNOSIS — I42.0 DILATED CARDIOMYOPATHY (HCC): ICD-10-CM

## 2021-09-13 DIAGNOSIS — I49.3 PREMATURE VENTRICULAR CONTRACTIONS: Primary | ICD-10-CM

## 2021-09-13 PROCEDURE — 93000 ELECTROCARDIOGRAM COMPLETE: CPT | Performed by: PHYSICIAN ASSISTANT

## 2021-09-13 PROCEDURE — 99214 OFFICE O/P EST MOD 30 MIN: CPT | Performed by: PHYSICIAN ASSISTANT

## 2021-09-13 NOTE — PROGRESS NOTES
Cardiac Electrophysiology Outpatient Consult Note            [facility] at [facility]    Consult Note     Arash Simmons  [id number]    Primary Care Physician: Sundeep Stone MD    Referred By: Pb Naqvi MD    Subjective       Chief Complaint   Patient presents with   • Cardiomyopathy       History of Present Illness:   Arash Simmons is a 61 y.o. male who presents to my electrophysiology clinic for evaluation of     1. Nonischemic cardiomyopathy:  a.  On 04/07/2015, abnormal myocardial perfusion study with evidence of dilated cardiomyopathy, ejection fraction of 16%, large fixed perfusion defect in the anterior apex, consistent with prior myocardial infarction.   b. On 05/08/2015, cardiac catheterization  with EF of 10% to 15%.  Normal coronary arteries.  Severe left ventricular dilatation.    c. Echo, 07/08/2015, LVEF 20%.   d. Status post biventricular ICD placement, August 2015.  e. 9/2020 echo EF 25-30% mild AR. Mild to mod MR. Read by Dr. Orozco.  2. Biventricular ICD, [employer], 8/2015  a. ICD shock 7/3/2020 due to SVT/atrial tachycardia at 220 bpm  b. ICD shock 8/21 due to atrial tachycardia with one-to-one conduction.  (Not A. Fib)  3. AT, Sotalol added.   4.  Left bundle branch block.   5. Hypertension.   6. Dyslipidemia.   7. Anxiety.   8. Cervical spine  9. Questionable sleep apnea.   10. Left knee replacement 2017  11. Abdominal/intestinal surgery as a child.  12. Elbow surgery  13. Tonsillectomy and adenoidectomy  14. Left knee replacement    Review of Systems:   Negative except from what is in HPI    Past Medical History:   Past Medical History:   Diagnosis Date   • Anxiety    • Arthritis    • Chest pain    • CHF (congestive heart failure) (CMS/HCC)     Chronic systolic   • Dyspnea on effort    • Fatigue    • Hyperlipidemia    • Hypertension    • Irritability    • LBBB (left bundle branch block)    • Left bundle branch block    • Lower back pain    •  Lower leg edema    • Nonischemic cardiomyopathy (CMS/HCC)    • PVC (premature ventricular contraction)    • Shortness of breath    • URTI (acute upper respiratory infection)        Past Surgical History:   Past Surgical History:   Procedure Laterality Date   • ADENOIDECTOMY     • ANTERIOR CERVICAL DISCECTOMY W/ FUSION N/A 2018    Procedure: ANTERIOR CERVICAL DECOMPRESSION WITH FUSION C4-5 RIGHT;  Surgeon: Israel Garcia MD;  Location: [facility];  Service: Orthopedic Spine   • CARDIAC CATHETERIZATION  2017    X2   • CARDIAC DEFIBRILLATOR PLACEMENT      BSC biventricular ICD 2015   • ELBOW PROCEDURE Right    • PACEMAKER IMPLANTATION      ICD, PLACED PER RUKAAINSLEY AND NOW F/U WITH CONSTANTINO    • TONSILLECTOMY     • TOTAL KNEE ARTHROPLASTY Left 10/2017       Family History:   Family History   Problem Relation Age of Onset   • COPD Mother    • Emphysema Mother    • Diabetes Mother    • COPD Father    • Other Father         black lung        Social History:   Social History     Socioeconomic History   • Marital status:      Spouse name: Not on file   • Number of children: Not on file   • Years of education: Not on file   • Highest education level: Not on file   Tobacco Use   • Smoking status: Former Smoker     Packs/day: 2.00     Years: 30.00     Pack years: 60.00     Quit date: 1999     Years since quittin.1   • Smokeless tobacco: Never Used   Substance and Sexual Activity   • Alcohol use: Yes     Comment: 2 beers per day   • Drug use: No   • Sexual activity: Defer     Partners: Female     Comment:        Medications:     Current Outpatient Medications:   •  acetaminophen (TYLENOL) 500 MG tablet, Take 500 mg by mouth Every 6 (Six) Hours As Needed for Mild Pain ., Disp: , Rfl:   •  aspirin 81 MG tablet, Take 81 mg by mouth Daily., Disp: , Rfl:   •  atorvastatin (LIPITOR) 10 MG tablet, Take 1 tablet by mouth Every Night., Disp: 90 tablet, Rfl: 1  •  FLUoxetine (PROzac) 20 MG  "tablet, Take 1 tablet by mouth Daily., Disp: 90 tablet, Rfl: 1  •  furosemide (LASIX) 40 MG tablet, Take 1 tablet by mouth once daily, Disp: 30 tablet, Rfl: 0  •  Multiple Vitamins-Iron (MULTI-VITAMIN/IRON PO), Take  by mouth Daily., Disp: , Rfl:   •  sacubitril-valsartan (Entresto) 24-26 MG tablet, Take 1 tablet by mouth Every 12 (Twelve) Hours., Disp: 180 tablet, Rfl: 1  •  Sotalol HCl AF 80 MG tablet, Take 1 tablet by mouth 2 (Two) Times a Day., Disp: 60 tablet, Rfl: 5  •  spironolactone (ALDACTONE) 25 MG tablet, Take 1 tablet by mouth once daily, Disp: 90 tablet, Rfl: 0    Allergies:   No Known Allergies    Objective   Vital Signs:   Vitals:    09/13/21 0837   BP: 118/62   BP Location: Left arm   Patient Position: Sitting   Pulse: 70   SpO2: 96%   Weight: 95.7 kg (211 lb)   Height: 177.8 cm (70\")       PHYSICAL EXAM  General appearance: Awake, alert, cooperative  Head: Normocephalic, without obvious abnormality, atraumatic  Eyes: Conjunctivae/corneas clear, EOMs intact  Neck: no adenopathy, no carotid bruit, no JVD and thyroid: not enlarged  Lungs: clear to auscultation bilaterally and no rhonchi or crackles\", ' symmetric  Heart: regular rate and rhythm, S1, S2 normal, no murmur, click, rub or gallop  Abdomen: Soft, non-tender, bowel sounds normal,  no organomegaly  Extremities: extremities normal, atraumatic, no cyanosis or edema  Skin: Skin color, turgor normal, no rashes or lesions  Neurologic: Grossly normal     Lab Results   Component Value Date    GLUCOSE 108 (H) 08/21/2021    CALCIUM 9.5 08/21/2021     08/21/2021    K 3.9 08/21/2021    CO2 24.0 08/21/2021     08/21/2021    BUN 15 08/21/2021    CREATININE 0.93 08/21/2021    EGFRIFAFRI 96 03/12/2021    EGFRIFNONA 83 08/21/2021    BCR 16.1 08/21/2021    ANIONGAP 12.0 08/21/2021     Lab Results   Component Value Date    WBC 8.89 08/21/2021    HGB 14.6 08/21/2021    HCT 43.0 08/21/2021    MCV 90.9 08/21/2021     08/21/2021     Lab Results "   Component Value Date    INR 0.99 10/11/2017    INR 1.02 08/20/2015    PROTIME 10.7 08/20/2015     Lab Results   Component Value Date    TSH 4.280 (H) 08/21/2021       Cardiac Testing:      I personally viewed and interpreted the patient's EKG/Telemetry/lab data      ECG 12 Lead    Date/Time: 9/13/2021 9:57 AM  Performed by: Farhan Colón PA  Authorized by: Farhan Colón PA   Rhythm: sinus rhythm and paced  Rate: normal  Conduction: conduction normal  T Waves: T waves normal  QRS axis: normal    Clinical impression: normal ECG            Tobacco Cessation: N/A  Obstructive Sleep Apnea Screening: Deffered    Assessment & Plan    Diagnoses and all orders for this visit:    1. Premature ventricular contractions (Primary)    2. Paroxysmal SVT (supraventricular tachycardia) (CMS/HCC)    3. LBBB (left bundle branch block)    4. Dilated cardiomyopathy (CMS/HCC)    Other orders  -     ECG 12 Lead         Diagnosis Plan   1. Premature ventricular contractions     2. Paroxysmal SVT (supraventricular tachycardia) (CMS/HCC)     3. LBBB (left bundle branch block)     4. Dilated cardiomyopathy (CMS/HCC)       Body mass index is 30.28 kg/m².          Follow Up: 6 months.   Continue current medical therapy. Patient's EKG stable on Sotalol with no recurrent episodes of SVT. We discussed option of EPS and RFA of SVT. He wishes to continue Sotalol as he is doing well  Return for follow up 6 months.     Electronically signed by LIBBY Enriquez, 09/13/21, 10:01 AM EDT.

## 2021-09-30 PROCEDURE — 93295 DEV INTERROG REMOTE 1/2/MLT: CPT | Performed by: INTERNAL MEDICINE

## 2021-09-30 PROCEDURE — 93296 REM INTERROG EVL PM/IDS: CPT | Performed by: INTERNAL MEDICINE

## 2021-10-04 ENCOUNTER — TELEPHONE (OUTPATIENT)
Dept: FAMILY MEDICINE CLINIC | Facility: CLINIC | Age: 61
End: 2021-10-04

## 2021-10-04 RX ORDER — FUROSEMIDE 40 MG/1
TABLET ORAL
Qty: 30 TABLET | Refills: 0 | Status: SHIPPED | OUTPATIENT
Start: 2021-10-04 | End: 2021-11-09

## 2021-10-08 ENCOUNTER — OFFICE VISIT (OUTPATIENT)
Dept: FAMILY MEDICINE CLINIC | Facility: CLINIC | Age: 61
End: 2021-10-08

## 2021-10-08 VITALS
WEIGHT: 214.2 LBS | TEMPERATURE: 97.7 F | HEART RATE: 81 BPM | BODY MASS INDEX: 33.62 KG/M2 | OXYGEN SATURATION: 97 % | HEIGHT: 67 IN | DIASTOLIC BLOOD PRESSURE: 74 MMHG | SYSTOLIC BLOOD PRESSURE: 142 MMHG | RESPIRATION RATE: 16 BRPM

## 2021-10-08 DIAGNOSIS — F41.9 ANXIETY: ICD-10-CM

## 2021-10-08 DIAGNOSIS — I10 ESSENTIAL HYPERTENSION: ICD-10-CM

## 2021-10-08 DIAGNOSIS — E78.00 PURE HYPERCHOLESTEROLEMIA: ICD-10-CM

## 2021-10-08 DIAGNOSIS — I50.22 CHRONIC SYSTOLIC CONGESTIVE HEART FAILURE (HCC): ICD-10-CM

## 2021-10-08 LAB
ALBUMIN SERPL-MCNC: 4.5 G/DL (ref 3.5–5.2)
ALBUMIN/GLOB SERPL: 2 G/DL
ALP SERPL-CCNC: 75 U/L (ref 39–117)
ALT SERPL-CCNC: 12 U/L (ref 1–41)
AST SERPL-CCNC: 14 U/L (ref 1–40)
BASOPHILS # BLD AUTO: 0.07 10*3/MM3 (ref 0–0.2)
BASOPHILS NFR BLD AUTO: 1 % (ref 0–1.5)
BILIRUB SERPL-MCNC: 0.7 MG/DL (ref 0–1.2)
BUN SERPL-MCNC: 22 MG/DL (ref 8–23)
BUN/CREAT SERPL: 23.4 (ref 7–25)
CALCIUM SERPL-MCNC: 9.4 MG/DL (ref 8.6–10.5)
CHLORIDE SERPL-SCNC: 105 MMOL/L (ref 98–107)
CHOLEST SERPL-MCNC: 155 MG/DL (ref 0–200)
CO2 SERPL-SCNC: 24.9 MMOL/L (ref 22–29)
CREAT SERPL-MCNC: 0.94 MG/DL (ref 0.76–1.27)
EOSINOPHIL # BLD AUTO: 0.23 10*3/MM3 (ref 0–0.4)
EOSINOPHIL NFR BLD AUTO: 3.3 % (ref 0.3–6.2)
ERYTHROCYTE [DISTWIDTH] IN BLOOD BY AUTOMATED COUNT: 12.3 % (ref 12.3–15.4)
GLOBULIN SER CALC-MCNC: 2.2 GM/DL
GLUCOSE SERPL-MCNC: 113 MG/DL (ref 65–99)
HCT VFR BLD AUTO: 44.1 % (ref 37.5–51)
HDLC SERPL-MCNC: 41 MG/DL (ref 40–60)
HGB BLD-MCNC: 14.1 G/DL (ref 13–17.7)
IMM GRANULOCYTES # BLD AUTO: 0.02 10*3/MM3 (ref 0–0.05)
IMM GRANULOCYTES NFR BLD AUTO: 0.3 % (ref 0–0.5)
LDLC SERPL CALC-MCNC: 96 MG/DL (ref 0–100)
LYMPHOCYTES # BLD AUTO: 1.8 10*3/MM3 (ref 0.7–3.1)
LYMPHOCYTES NFR BLD AUTO: 25.6 % (ref 19.6–45.3)
MCH RBC QN AUTO: 30 PG (ref 26.6–33)
MCHC RBC AUTO-ENTMCNC: 32 G/DL (ref 31.5–35.7)
MCV RBC AUTO: 93.8 FL (ref 79–97)
MONOCYTES # BLD AUTO: 0.77 10*3/MM3 (ref 0.1–0.9)
MONOCYTES NFR BLD AUTO: 11 % (ref 5–12)
NEUTROPHILS # BLD AUTO: 4.13 10*3/MM3 (ref 1.7–7)
NEUTROPHILS NFR BLD AUTO: 58.8 % (ref 42.7–76)
NRBC BLD AUTO-RTO: 0 /100 WBC (ref 0–0.2)
PLATELET # BLD AUTO: 240 10*3/MM3 (ref 140–450)
POTASSIUM SERPL-SCNC: 4.7 MMOL/L (ref 3.5–5.2)
PROT SERPL-MCNC: 6.7 G/DL (ref 6–8.5)
RBC # BLD AUTO: 4.7 10*6/MM3 (ref 4.14–5.8)
SODIUM SERPL-SCNC: 141 MMOL/L (ref 136–145)
TRIGL SERPL-MCNC: 95 MG/DL (ref 0–150)
VLDLC SERPL CALC-MCNC: 18 MG/DL (ref 5–40)
WBC # BLD AUTO: 7.02 10*3/MM3 (ref 3.4–10.8)

## 2021-10-08 PROCEDURE — 99214 OFFICE O/P EST MOD 30 MIN: CPT | Performed by: FAMILY MEDICINE

## 2021-10-08 PROCEDURE — 90471 IMMUNIZATION ADMIN: CPT | Performed by: FAMILY MEDICINE

## 2021-10-08 PROCEDURE — 90686 IIV4 VACC NO PRSV 0.5 ML IM: CPT | Performed by: FAMILY MEDICINE

## 2021-10-08 RX ORDER — SACUBITRIL AND VALSARTAN 24; 26 MG/1; MG/1
1 TABLET, FILM COATED ORAL EVERY 12 HOURS
Qty: 180 TABLET | Refills: 1 | Status: SHIPPED | OUTPATIENT
Start: 2021-10-08 | End: 2022-04-08 | Stop reason: SDUPTHER

## 2021-10-08 RX ORDER — ATORVASTATIN CALCIUM 10 MG/1
10 TABLET, FILM COATED ORAL NIGHTLY
Qty: 90 TABLET | Refills: 1 | Status: SHIPPED | OUTPATIENT
Start: 2021-10-08 | End: 2022-04-08 | Stop reason: SDUPTHER

## 2021-10-08 RX ORDER — SPIRONOLACTONE 25 MG/1
25 TABLET ORAL DAILY
Qty: 90 TABLET | Refills: 1 | Status: SHIPPED | OUTPATIENT
Start: 2021-10-08 | End: 2022-04-08 | Stop reason: SDUPTHER

## 2021-10-08 RX ORDER — FLUOXETINE 20 MG/1
20 TABLET, FILM COATED ORAL DAILY
Qty: 90 TABLET | Refills: 1 | Status: SHIPPED | OUTPATIENT
Start: 2021-10-08 | End: 2022-04-08 | Stop reason: SDUPTHER

## 2021-10-08 NOTE — PROGRESS NOTES
Subjective   Arash Simmons is a 61 y.o. male.     History of Present Illness     Arash Simmons  is here for follow-up of hypertension of several years duration. He is not exercising and is not adherent to a low-salt diet. Patient does not check his blood pressure.  He is compliant with meds.  His pacemker did fire once and he had some med changes        Arash Simmons returns today for follow up of Hyperlipidemia  Arash indicates his exercise level as not at all.  Diet: trying to eat well  Patient is compliant with medications   Any side effects to medications:   chest pain No myalgia No memory change No  Pt is due for labs    Mood is doing well with the prozac  No issues noted with the medicine  He would like this refilled as well    The following portions of the patient's history were reviewed and updated as appropriate: allergies, current medications, past family history, past medical history, past social history, past surgical history and problem list.    Review of Systems   Constitutional: Negative.    Respiratory: Negative.    Psychiatric/Behavioral: Negative.        Objective   Physical Exam  Vitals and nursing note reviewed.   Constitutional:       General: He is not in acute distress.     Appearance: Normal appearance. He is well-developed.   Cardiovascular:      Rate and Rhythm: Normal rate and regular rhythm.      Heart sounds: Normal heart sounds.   Pulmonary:      Effort: Pulmonary effort is normal.      Breath sounds: Normal breath sounds.   Neurological:      Mental Status: He is alert and oriented to person, place, and time.   Psychiatric:         Mood and Affect: Mood normal.         Behavior: Behavior normal.         Thought Content: Thought content normal.         Judgment: Judgment normal.         Assessment/Plan   Diagnoses and all orders for this visit:    1. Chronic systolic congestive heart failure (HCC)  -     sacubitril-valsartan (Entresto) 24-26 MG tablet; Take 1 tablet by mouth  Every 12 (Twelve) Hours.  Dispense: 180 tablet; Refill: 1  -     spironolactone (ALDACTONE) 25 MG tablet; Take 1 tablet by mouth Daily.  Dispense: 90 tablet; Refill: 1    2. Essential hypertension  -     spironolactone (ALDACTONE) 25 MG tablet; Take 1 tablet by mouth Daily.  Dispense: 90 tablet; Refill: 1  -     CBC & Differential  -     Comprehensive Metabolic Panel    3. Pure hypercholesterolemia  -     atorvastatin (LIPITOR) 10 MG tablet; Take 1 tablet by mouth Every Night.  Dispense: 90 tablet; Refill: 1  -     Comprehensive Metabolic Panel  -     Lipid Panel    4. Anxiety  -     FLUoxetine (PROzac) 20 MG tablet; Take 1 tablet by mouth Daily.  Dispense: 90 tablet; Refill: 1    Other orders  -     FluLaval/Fluarix >6 Months (5052-3635)    continue spironolactone and entresto.  Cardiology has changed some meds with pacemaker firing and this has worked well.  He will f/u as scheduled with cardiology  lipitor refilled and will check lipids  prozac also refilled as it is working well for mood  Flu shot given

## 2021-11-09 RX ORDER — FUROSEMIDE 40 MG/1
TABLET ORAL
Qty: 30 TABLET | Refills: 2 | Status: SHIPPED | OUTPATIENT
Start: 2021-11-09 | End: 2022-03-03

## 2021-12-07 ENCOUNTER — TELEPHONE (OUTPATIENT)
Dept: FAMILY MEDICINE CLINIC | Facility: CLINIC | Age: 61
End: 2021-12-07

## 2021-12-07 NOTE — TELEPHONE ENCOUNTER
I am recommending moderna or pfizer booster in accordance with CDC recommendations.  These can be from any local pharmacy

## 2021-12-07 NOTE — TELEPHONE ENCOUNTER
Provider: KO     Caller: RAFIA RAMIREZ     Phone Number: [phone]    Reason for Call: PATIENT STATES THAT HE HAD HIS VASHTI & VASHTI COVID VACCINE IN MARCH OR April AND ASKS IF IT MATTERS WHAT BOOSTER VACCINE HE GETS

## 2021-12-22 ENCOUNTER — OFFICE VISIT (OUTPATIENT)
Dept: FAMILY MEDICINE CLINIC | Facility: CLINIC | Age: 61
End: 2021-12-22

## 2021-12-22 VITALS
TEMPERATURE: 97.8 F | SYSTOLIC BLOOD PRESSURE: 130 MMHG | DIASTOLIC BLOOD PRESSURE: 70 MMHG | HEART RATE: 76 BPM | RESPIRATION RATE: 20 BRPM | WEIGHT: 219 LBS | BODY MASS INDEX: 34.37 KG/M2 | HEIGHT: 67 IN

## 2021-12-22 DIAGNOSIS — M70.51 SUPRAPATELLAR BURSITIS OF RIGHT KNEE: Primary | ICD-10-CM

## 2021-12-22 LAB
BASOPHILS # BLD AUTO: 0.07 10*3/MM3 (ref 0–0.2)
BASOPHILS NFR BLD AUTO: 0.8 % (ref 0–1.5)
BUN SERPL-MCNC: 15 MG/DL (ref 8–23)
BUN/CREAT SERPL: 16.1 (ref 7–25)
CALCIUM SERPL-MCNC: 9.5 MG/DL (ref 8.6–10.5)
CHLORIDE SERPL-SCNC: 104 MMOL/L (ref 98–107)
CO2 SERPL-SCNC: 26.6 MMOL/L (ref 22–29)
CREAT SERPL-MCNC: 0.93 MG/DL (ref 0.76–1.27)
CRP SERPL-MCNC: <0.3 MG/DL (ref 0–0.5)
EOSINOPHIL # BLD AUTO: 0.22 10*3/MM3 (ref 0–0.4)
EOSINOPHIL NFR BLD AUTO: 2.6 % (ref 0.3–6.2)
ERYTHROCYTE [DISTWIDTH] IN BLOOD BY AUTOMATED COUNT: 12.1 % (ref 12.3–15.4)
ERYTHROCYTE [SEDIMENTATION RATE] IN BLOOD BY WESTERGREN METHOD: 10 MM/HR (ref 0–20)
GLUCOSE SERPL-MCNC: 119 MG/DL (ref 65–99)
HCT VFR BLD AUTO: 39.7 % (ref 37.5–51)
HGB BLD-MCNC: 13.5 G/DL (ref 13–17.7)
IMM GRANULOCYTES # BLD AUTO: 0.02 10*3/MM3 (ref 0–0.05)
IMM GRANULOCYTES NFR BLD AUTO: 0.2 % (ref 0–0.5)
LYMPHOCYTES # BLD AUTO: 2.56 10*3/MM3 (ref 0.7–3.1)
LYMPHOCYTES NFR BLD AUTO: 29.9 % (ref 19.6–45.3)
MCH RBC QN AUTO: 30.5 PG (ref 26.6–33)
MCHC RBC AUTO-ENTMCNC: 34 G/DL (ref 31.5–35.7)
MCV RBC AUTO: 89.8 FL (ref 79–97)
MONOCYTES # BLD AUTO: 0.92 10*3/MM3 (ref 0.1–0.9)
MONOCYTES NFR BLD AUTO: 10.7 % (ref 5–12)
NEUTROPHILS # BLD AUTO: 4.78 10*3/MM3 (ref 1.7–7)
NEUTROPHILS NFR BLD AUTO: 55.8 % (ref 42.7–76)
NRBC BLD AUTO-RTO: 0 /100 WBC (ref 0–0.2)
PLATELET # BLD AUTO: 281 10*3/MM3 (ref 140–450)
POTASSIUM SERPL-SCNC: 4.6 MMOL/L (ref 3.5–5.2)
RBC # BLD AUTO: 4.42 10*6/MM3 (ref 4.14–5.8)
SODIUM SERPL-SCNC: 140 MMOL/L (ref 136–145)
URATE SERPL-MCNC: 8.8 MG/DL (ref 3.4–7)
WBC # BLD AUTO: 8.57 10*3/MM3 (ref 3.4–10.8)

## 2021-12-22 PROCEDURE — 99213 OFFICE O/P EST LOW 20 MIN: CPT | Performed by: FAMILY MEDICINE

## 2021-12-22 RX ORDER — PREDNISONE 20 MG/1
TABLET ORAL
Qty: 16 TABLET | Refills: 0 | Status: SHIPPED | OUTPATIENT
Start: 2021-12-22 | End: 2022-01-11

## 2021-12-22 NOTE — PATIENT INSTRUCTIONS
Go to the nearest ER or return to clinic if symptoms worsen, fever/chill develop    Prepatellar Bursitis    Prepatellar bursitis is inflammation of the prepatellar bursa, which is a fluid-filled sac that cushions the kneecap (patella). Prepatellar bursitis happens when fluid builds up in this sac and causes it to swell. The condition causes knee pain.  What are the causes?  This condition may be caused by:  · Constant pressure on the knees from kneeling.  · A hit to the knee.  · Falling on the knee.  · Infection from bacteria.  · Moving the knee often in a forceful way.  What increases the risk?  You are more likely to develop this condition if:  · You play sports that have a high risk of falling on the knee or being hit on the knee. These include football, wrestling, basketball, or soccer.  · You do work in which you kneel for long periods of time, such as sherine, plumbing, or gardening.  · You have another inflammatory condition, such as gout or rheumatoid arthritis.  What are the signs or symptoms?  The most common symptom of this condition is knee pain that gets better with rest. Other symptoms include:  · Swelling on the front of the kneecap.  · Warmth in the knee.  · Tenderness with activity.  · Redness in the knee.  · Inability to bend the knee or to kneel.  How is this diagnosed?  This condition is diagnosed based on:  · A physical exam. Your health care provider will compare your knees and check for tenderness and pain while moving your knee.  · Your medical history.  · Tests to check for infection. These may include blood tests and tests on the fluid in the bursa.  · Imaging tests, such as X-ray, MRI, or ultrasound, to check for damage in the patella, or fluid buildup and swelling in the bursa.  How is this treated?  This condition may be treated by:  · Resting the knee.  · Putting ice on the knee.  · Taking medicines, such as:  ? NSAIDs. These can help to reduce pain and swelling.  ? Antibiotics. These  may be needed if you have an infection.  ? Steroids. These are used to reduce swelling and inflammation, and may be prescribed if other treatments are not helping.  · Raising (elevating) the knee while resting.  · Doing exercises to help you maintain movement (physical therapy). These may be recommended after pain and swelling improve.  · Having a procedure to remove fluid from the bursa. This may be done if other treatments are not helping.  · Having surgery to remove the bursa. This may be done if you have a severe infection or if the condition keeps coming back after treatment.  Follow these instructions at home:  Medicines  · Take over-the-counter and prescription medicines only as told by your health care provider.  · If you were prescribed an antibiotic medicine, take it as told by your health care provider. Do not stop taking the antibiotic even if you start to feel better.  Managing pain, stiffness, and swelling    · If directed, put ice on the injured area.  ? Put ice in a plastic bag.  ? Place a towel between your skin and the bag.  ? Leave the ice on for 20 minutes, 2-3 times a day.  · Elevate the injured area above the level of your heart while you are sitting or lying down.    Activity  · Do not use the injured limb to support your body weight until your health care provider says that you can.  · Rest your knee.  · Avoid activities that cause knee pain.  · Return to your normal activities as told by your health care provider. Ask your health care provider what activities are safe for you.  · Do exercises as told by your health care provider.  General instructions  · Ask your health care provider when it is safe for you to drive.  · Do not use any products that contain nicotine or tobacco, such as cigarettes, e-cigarettes, and chewing tobacco. These can delay healing. If you need help quitting, ask your health care provider.  · Keep all follow-up visits as told by your health care provider. This is  important.  How is this prevented?  · Warm up and stretch before being active.  · Cool down and stretch after being active.  · Give your body time to rest between periods of activity.  · Maintain physical fitness, including strength and flexibility.  · Be safe and responsible while being active. This will help you to avoid falls.  · Wear knee pads if you have to kneel for a long period of time.  Contact a health care provider if:  · Your symptoms do not improve or get worse.  · Your symptoms keep coming back after treatment.  · You develop a fever and have warmth, redness, or swelling over your knee.  Summary  · Prepatellar bursitis is inflammation of the prepatellar bursa, which is a fluid-filled sac that cushions the kneecap (patella).  · This condition may be caused by injury or constant pressure on the knee. It may also be caused by an infection from bacteria.  · Symptoms of this condition include pain, swelling, warmth, and tenderness in the knee.  · Follow instructions from your health care provider about taking medicines, resting, and doing activities.  · Contact your health care provider if your symptoms do not improve, get worse, or keep coming back after treatment.  This information is not intended to replace advice given to you by your health care provider. Make sure you discuss any questions you have with your health care provider.  Document Revised: 04/10/2020 Document Reviewed: 02/27/2020  [employer] Patient Education © 2021 [employer]

## 2021-12-22 NOTE — PROGRESS NOTES
"Chief Complaint  Right knee pain & swelling (started after raising from a seated position yesterday )    Subjective          Arash Simmons presents to [facility]  History of Present Illness  Right knee pain started yesterday after going from seated to standing position.   Pain is still present, along with swelling. Sore to touch  Feels better if knee is extended, flexing it hurts more   Treated symptoms with Tylenol this morning   Working long hours, on feet often throughout shift.     The following portions of the patient's history were reviewed and updated as appropriate: allergies, current medications, past family history, past medical history, past social history, past surgical history and problem list.    Objective   Vital Signs:   /70   Pulse 76   Temp 97.8 °F (36.6 °C)   Resp 20   Ht 170.2 cm (67.01\")   Wt 99.3 kg (219 lb)   BMI 34.29 kg/m²     Physical Exam  Vitals and nursing note reviewed.   Constitutional:       Appearance: He is well-developed.   HENT:      Head: Normocephalic and atraumatic.      Nose: Nose normal.   Eyes:      Conjunctiva/sclera: Conjunctivae normal.   Pulmonary:      Effort: Pulmonary effort is normal. No respiratory distress.   Musculoskeletal:         General: No deformity.      Right knee: Swelling (pre-petella) present.      Comments: Very little erythema and warmth of right knee. ROM intact. Ambulates without assistance.    Skin:     General: Skin is warm.   Neurological:      Mental Status: He is alert and oriented to person, place, and time.        Result Review :                 Assessment and Plan    Diagnoses and all orders for this visit:    1. Suprapatellar bursitis of right knee (Primary)  -     Uric acid  -     CBC & Differential  -     C-reactive Protein  -     Sedimentation Rate  -     Basic Metabolic Panel  -     predniSONE (DELTASONE) 20 MG tablet; Take 3 tabs po qd x 2 days, 2 tabs po qd x 3 days, 1 tab po qd x 3 " days, 1/2 tab po qd x 2 days  Dispense: 16 tablet; Refill: 0    He will remain off from work x 2 days. Rest right knee joint x 24-48 hours, along with cold compresses 15-20 mins q 2 hours prn.  Steroid taper to improve  Labs completed to evaluate, r/o gout. No prior history, so more likely bursitis as source.    Follow Up   Return if symptoms worsen or fail to improve.  Patient was given instructions and counseling regarding his condition or for health maintenance advice. Please see specific information pulled into the AVS if appropriate.

## 2021-12-30 ENCOUNTER — TELEPHONE (OUTPATIENT)
Dept: CARDIOLOGY | Facility: CLINIC | Age: 61
End: 2021-12-30

## 2021-12-30 NOTE — TELEPHONE ENCOUNTER
Missed scheduled remote cardiac device transmission. Called patient-left voice mail message to send in transmission or call device clinic for assistance.

## 2022-01-11 ENCOUNTER — TELEPHONE (OUTPATIENT)
Dept: FAMILY MEDICINE CLINIC | Facility: CLINIC | Age: 62
End: 2022-01-11

## 2022-01-11 DIAGNOSIS — M70.51 SUPRAPATELLAR BURSITIS OF RIGHT KNEE: Primary | ICD-10-CM

## 2022-01-11 RX ORDER — METHYLPREDNISOLONE 4 MG/1
TABLET ORAL
Qty: 21 TABLET | Refills: 0 | Status: SHIPPED | OUTPATIENT
Start: 2022-01-11 | End: 2022-03-21

## 2022-01-11 NOTE — TELEPHONE ENCOUNTER
Caller: Arash Simmons    Relationship: Self    Best call back number: [phone]    What is the best time to reach you: ANY TIME     Who are you requesting to speak with (clinical staff, provider,  specific staff member): DR. CONNELL OR HIS NURSE     Do you know the name of the person who called: N/A    What was the call regarding: PATIENT SEEN S[facility]NA 12/22/2021 REGARDING HIS KNEE AND WAS GIVEN PREDNISONE. IT WENT DOWN BUT ALL THE FLUID DID NOT RELEASE AND NOW IT IS FLARED BACK UP. PATIENT IS REQUESTING A CALL BACK FROM DR CONNELL OR HIS NURSE REGARDING THIS MATTER     Do you require a callback: YES

## 2022-01-11 NOTE — TELEPHONE ENCOUNTER
He will need to see orthopedist, will likely need joint aspirated. I will place an urgent referral, along with another round of steroid to help with current symptoms. Apply cold compresses to the affected knee.

## 2022-01-12 NOTE — TELEPHONE ENCOUNTER
If he cannot be seen there soon, then I could see him to attempt to drain his knee in the office.   Ok for appointment if ortho can't see him in a timely fashion

## 2022-03-03 RX ORDER — FUROSEMIDE 40 MG/1
TABLET ORAL
Qty: 30 TABLET | Refills: 0 | Status: SHIPPED | OUTPATIENT
Start: 2022-03-03 | End: 2022-04-06

## 2022-03-14 ENCOUNTER — TELEPHONE (OUTPATIENT)
Dept: CARDIOLOGY | Facility: CLINIC | Age: 62
End: 2022-03-14

## 2022-03-21 ENCOUNTER — OFFICE VISIT (OUTPATIENT)
Dept: CARDIOLOGY | Facility: CLINIC | Age: 62
End: 2022-03-21

## 2022-03-21 VITALS
WEIGHT: 216 LBS | HEIGHT: 70 IN | OXYGEN SATURATION: 96 % | SYSTOLIC BLOOD PRESSURE: 118 MMHG | BODY MASS INDEX: 30.92 KG/M2 | DIASTOLIC BLOOD PRESSURE: 62 MMHG | HEART RATE: 71 BPM

## 2022-03-21 DIAGNOSIS — Z79.899 LONG TERM CURRENT USE OF ANTIARRHYTHMIC DRUG: ICD-10-CM

## 2022-03-21 DIAGNOSIS — I48.0 PAROXYSMAL ATRIAL FIBRILLATION: Primary | ICD-10-CM

## 2022-03-21 DIAGNOSIS — I48.0 PAROXYSMAL ATRIAL FIBRILLATION: ICD-10-CM

## 2022-03-21 DIAGNOSIS — I44.7 LBBB (LEFT BUNDLE BRANCH BLOCK): ICD-10-CM

## 2022-03-21 DIAGNOSIS — I10 PRIMARY HYPERTENSION: ICD-10-CM

## 2022-03-21 DIAGNOSIS — Z95.810 ICD (IMPLANTABLE CARDIOVERTER-DEFIBRILLATOR), DUAL, IN SITU: ICD-10-CM

## 2022-03-21 DIAGNOSIS — I50.22 CHRONIC SYSTOLIC CONGESTIVE HEART FAILURE: ICD-10-CM

## 2022-03-21 DIAGNOSIS — I42.8 NICM (NONISCHEMIC CARDIOMYOPATHY): Primary | ICD-10-CM

## 2022-03-21 PROCEDURE — 99215 OFFICE O/P EST HI 40 MIN: CPT | Performed by: INTERNAL MEDICINE

## 2022-03-21 PROCEDURE — 93284 PRGRMG EVAL IMPLANTABLE DFB: CPT | Performed by: INTERNAL MEDICINE

## 2022-03-21 RX ORDER — CHLORHEXIDINE GLUCONATE 0.12 MG/ML
RINSE ORAL
COMMUNITY
Start: 2022-03-16 | End: 2022-04-08

## 2022-03-21 RX ORDER — AMOXICILLIN 500 MG/1
CAPSULE ORAL
COMMUNITY
Start: 2022-03-16 | End: 2022-04-08

## 2022-03-21 RX ORDER — HYDROCODONE BITARTRATE AND ACETAMINOPHEN 7.5; 325 MG/1; MG/1
TABLET ORAL SEE ADMIN INSTRUCTIONS
COMMUNITY
Start: 2022-03-16 | End: 2022-05-04

## 2022-03-21 NOTE — PROGRESS NOTES
Cardiac Electrophysiology Outpatient Follow Up Note            [facility] at [facility]    Follow Up Office Visit      Arash Simmons  [id number]  03/21/2022  [unfilled]  [unfilled]    Primary Care Physician: Sundeep Stone MD    Referred By: No ref. provider found    Subjective     Chief Complaint:   Diagnoses and all orders for this visit:    1. NICM (nonischemic cardiomyopathy) (HCC) (Primary)    2. Chronic systolic congestive heart failure (HCC)    3. ICD (implantable cardioverter-defibrillator), dual, in situ    4. LBBB (left bundle branch block)    5. Long term current use of antiarrhythmic drug    6. Primary hypertension    7. Paroxysmal atrial fibrillation (HCC)    Other orders  -     apixaban (ELIQUIS) 5 MG tablet tablet; Take 1 tablet by mouth Every 12 (Twelve) Hours.  Dispense: 365 tablet; Refill: 3  -     Sotalol HCl AF 80 MG tablet; Take 2 tablets by mouth 2 (Two) Times a Day.  Dispense: 60 tablet; Refill: 5      Chief Complaint   Patient presents with   • Premature ventricular contractions     PROBLEM LIST:     1. Nonischemic cardiomyopathy / Systolic Congestive Heart Failure   a.  On 04/07/2015, abnormal myocardial perfusion study with evidence of dilated cardiomyopathy, ejection fraction of 16%, large fixed perfusion defect in the anterior apex, consistent with prior myocardial infarction.   b. On 05/08/2015, cardiac catheterization  with EF of 10% to 15%.  Normal coronary arteries.  Severe left ventricular dilatation.    c. Echo, 07/08/2015, LVEF 20%.   d. Status post biventricular ICD placement, August 2015.  e. 9/2020 echo EF 25-30% mild AR. Mild to mod MR. Read by Dr. Orozco.  2. Biventricular ICD, [employer], 8/2015  a. ICD shock 7/3/2020 due to SVT/atrial tachycardia at 220 bpm  3. Left bundle branch block.   4. Hypertension.   5. Dyslipidemia.   6. Anxiety.   7. Questionable sleep apnea.   8. Left knee replacement 2017    History of  Present Illness:   Arash Simmons is a 61 y.o. male who presents to my electrophysiology clinic for follow up of above complaints.  Arash reports to me indeed that he said multiple unpleasant ICD shocks.  They make him feel terrible.  They are awful.  Quite painful.  This is not his first encounter with a recurrent ICD shocks.  I met with him back in 2020 for the same reason.    He also has severe palpitations during rapid A. fib that preceded the ICD shocks.  He feels short of breath with it.  When he is not in rapid A. fib he actually feels pretty good.      Review of Systems:   Constitutional: No fevers or chills, no recent weight gain or weight loss or fatigue  Eyes: No visual loss, blurred vision, double vision, yellow sclerae.  ENT: No headaches, hearing loss, vertigo, congestion or sore throat.   Cardiovascular: Per HPI  Respiratory: No cough or wheezing, no sputum production, no hematemesis   Gastrointestinal: No abdominal pain, no nausea, vomiting, constipation, diarrhea, melena.   Genitourinary: No dysuria, hematuria or increased frequency.  Musculoskeletal:  No gait disturbance, weakness or joint pain or stiffness  Integumentary: No rashes, urticaria, ulcers or sores.   Neurological: No headache, dizziness, syncope, paralysis, ataxia, no prior CVA/TIA  Psychiatric: No anxiety, or depression  Endocrine: No diaphoresis, cold or heat intolerance. No polyuria or polydipsia.   Hematologic/Lymphatic: No anemia, abnormal bruising or bleeding. No history of DVT/PE.      Past Medical History:   Past Medical History:   Diagnosis Date   • Anxiety    • Arthritis    • Hyperlipidemia    • Hypertension    • LBBB (left bundle branch block)    • Lower back pain    • Shortness of breath        Past Surgical History:   Past Surgical History:   Procedure Laterality Date   • ADENOIDECTOMY     • ANTERIOR CERVICAL DISCECTOMY W/ FUSION N/A 9/19/2018    Procedure: ANTERIOR CERVICAL DECOMPRESSION WITH FUSION C4-5 RIGHT;   Surgeon: Israel Garcia MD;  Location: [facility];  Service: Orthopedic Spine   • CARDIAC CATHETERIZATION  2017    X2   • CARDIAC DEFIBRILLATOR PLACEMENT      [facility] biventricular ICD 2015   • ELBOW PROCEDURE Right 1973   • PACEMAKER IMPLANTATION      ICD, PLACED PER RUKAAINSLEY AND NOW F/U WITH CONSTANTINO    • TONSILLECTOMY     • TOTAL KNEE ARTHROPLASTY Left 10/2017       Family History:   Family History   Problem Relation Age of Onset   • COPD Mother    • Emphysema Mother    • Diabetes Mother    • COPD Father    • Other Father         black lung        Social History:   Social History     Socioeconomic History   • Marital status:    Tobacco Use   • Smoking status: Former Smoker     Packs/day: 2.00     Years: 30.00     Pack years: 60.00     Quit date: 1999     Years since quittin.6   • Smokeless tobacco: Never Used   Vaping Use   • Vaping Use: Never used   Substance and Sexual Activity   • Alcohol use: Yes     Comment: 2 beers per day   • Drug use: No   • Sexual activity: Defer     Partners: Female     Comment:        Medications:     Current Outpatient Medications:   •  acetaminophen (TYLENOL) 500 MG tablet, Take 500 mg by mouth Every 6 (Six) Hours As Needed for Mild Pain ., Disp: , Rfl:   •  amoxicillin (AMOXIL) 500 MG capsule, TAKE 1 CAPSULE BY MOUTH THREE TIMES DAILY UNTIL GONE, Disp: , Rfl:   •  atorvastatin (LIPITOR) 10 MG tablet, Take 1 tablet by mouth Every Night., Disp: 90 tablet, Rfl: 1  •  chlorhexidine (PERIDEX) 0.12 % solution, SWISH 15ML FOR 30 SECONDS, MAY USE UP TO THREE TIMES DAILY FOR 2 WEEKS, Disp: , Rfl:   •  FLUoxetine (PROzac) 20 MG tablet, Take 1 tablet by mouth Daily., Disp: 90 tablet, Rfl: 1  •  furosemide (LASIX) 40 MG tablet, Take 1 tablet by mouth once daily, Disp: 30 tablet, Rfl: 0  •  HYDROcodone-acetaminophen (NORCO) 7.5-325 MG per tablet, Take  by mouth See Admin Instructions. Take 1 tablet by mouth every 6-8 hours as needed for pain, Disp: , Rfl:   •   "Multiple Vitamins-Iron (MULTI-VITAMIN/IRON PO), Take 1 tablet by mouth Daily., Disp: , Rfl:   •  sacubitril-valsartan (Entresto) 24-26 MG tablet, Take 1 tablet by mouth Every 12 (Twelve) Hours., Disp: 180 tablet, Rfl: 1  •  Sotalol HCl AF 80 MG tablet, Take 2 tablets by mouth 2 (Two) Times a Day., Disp: 60 tablet, Rfl: 5  •  spironolactone (ALDACTONE) 25 MG tablet, Take 1 tablet by mouth Daily., Disp: 90 tablet, Rfl: 1  •  apixaban (ELIQUIS) 5 MG tablet tablet, Take 1 tablet by mouth Every 12 (Twelve) Hours., Disp: 365 tablet, Rfl: 3    Allergies:   No Known Allergies    Objective   Vital Signs:   Vitals:    03/21/22 1531   BP: 118/62   BP Location: Left arm   Patient Position: Sitting   Pulse: 71   SpO2: 96%   Weight: 98 kg (216 lb)   Height: 177.8 cm (70\")       PHYSICAL EXAM  General appearance: Awake, alert, cooperative  Head: Normocephalic, without obvious abnormality, atraumatic  Eyes: Conjunctivae/corneas clear, EOMs intact  Neck: no adenopathy, no carotid bruit, no JVD and thyroid: not enlarged  Lungs: clear to auscultation bilaterally and no rhonchi or crackles\", ' symmetric  Heart: regular rate and rhythm, S1, S2 normal, no murmur, click, rub or gallop  Abdomen: Soft, non-tender, bowel sounds normal,  no organomegaly  Extremities: extremities normal, atraumatic, no cyanosis or edema  Skin: Skin color, turgor normal, no rashes or lesions  Neurologic: Grossly normal     Lab Results   Component Value Date    GLUCOSE 119 (H) 12/22/2021    CALCIUM 9.5 12/22/2021     12/22/2021    K 4.6 12/22/2021    CO2 26.6 12/22/2021     12/22/2021    BUN 15 12/22/2021    CREATININE 0.93 12/22/2021    EGFRIFAFRI 100 12/22/2021    EGFRIFNONA 83 12/22/2021    BCR 16.1 12/22/2021    ANIONGAP 12.0 08/21/2021     Lab Results   Component Value Date    WBC 8.57 12/22/2021    HGB 13.5 12/22/2021    HCT 39.7 12/22/2021    MCV 89.8 12/22/2021     12/22/2021     Lab Results   Component Value Date    INR 0.99 " 10/11/2017    INR 1.02 08/20/2015    PROTIME 10.7 08/20/2015     Lab Results   Component Value Date    TSH 4.280 (H) 08/21/2021       Cardiac Testing:     I personally viewed and interpreted the patient's EKG/Telemetry/lab data    Procedures    Tobacco Cessation: N/A  Obstructive Sleep Apnea Screening: Completed    Assessment & Plan    Diagnoses and all orders for this visit:    1. NICM (nonischemic cardiomyopathy) ([facility]) (Primary)    2. Chronic systolic congestive heart failure (HCC)    3. ICD (implantable cardioverter-defibrillator), dual, in situ    4. LBBB (left bundle branch block)    5. Long term current use of antiarrhythmic drug    6. Primary hypertension    7. Paroxysmal atrial fibrillation (HCC)    Other orders  -     apixaban (ELIQUIS) 5 MG tablet tablet; Take 1 tablet by mouth Every 12 (Twelve) Hours.  Dispense: 365 tablet; Refill: 3  -     Sotalol HCl AF 80 MG tablet; Take 2 tablets by mouth 2 (Two) Times a Day.  Dispense: 60 tablet; Refill: 5         Diagnosis Plan   1. NICM (nonischemic cardiomyopathy) (HCC)   good medical therapy.  No progression of his nonischemic cardiomyopathy.  Continue Entresto and Aldactone.    Consider Toprol-XL in the future.  Do not want to make too many medication changes today however.   2. Chronic systolic congestive heart failure (HCC)   chronic systolic heart failure.  Euvolemic today.    Sotalol therapy for atrial fibrillation as outlined below.  Ideally would like to get him on Toprol-XL additionally.  See above.  Euvolemic.  No further diuresis required.   3. ICD (implantable cardioverter-defibrillator), dual, in situ   [employer] resynchronization ICD system in situ.  Normal device function.  Atrial fibrillation episodes resulting in inappropriate ICD therapy due to ventricular response rates of more than 240 bpm.    See below for full discussion.      This patient's Cardiac Implanted Electronic Device was manually interrogated and reprogrammed during the  patient encounter today.  Iterative programming changes were manually made to determine the sensing threshold, pacing threshold, lead impedance as well as underlying cardiac rhythm.  These programming changes were not limited to but included some or all of the following when appropriate: pacing mode, programmed AV delays, blanking periods, and refractory periods.  Data obtained as a result of these manual programing changes informed the patient's CIED permanent programming.   4. LBBB (left bundle branch block)   CRT in situ.   5. Long term current use of antiarrhythmic drug   sotalol.  Low-dose.  Increase 160 mg p.o. twice daily see below.   6. Primary hypertension   blood pressure reasonably well controlled.   7. Paroxysmal atrial fibrillation (HCC)   this is the thrust of our visit today.  Multiple recurrent inappropriate ICD shocks.  Rapid atrial fibrillation greater than 240 bpm.    Discussed with the patient that antiarrhythmic drug therapy increase pacifically increasing sotalol 160 p.o. twice daily is certainly in his best interest but by itself is unlikely to definitively suppress recurrent A. fib and ICD shocks.     Discussed with him the option of AV node ablation.  He is not interested in this.    Discussed with him the option of cath ablation left atrium to transilluminate his A. fib.  Discussed that this indeed in my opinion is the most favorable option.  There is data that there may be mortality benefit in this context with an ICD for nonischemic cardiomyopathy.    I reviewed the specific risk-benefit profile the procedure.  I quoted the patient a 1 to 2% chance of significant procedure complication including but not limited to bleeding at the groin, cardiac perforation, tamponade, stroke, myocardial infarction, death phrenic nerve injury, pulmonary vein stenosis, catheter entrapment of the mitral valve annulus, esophageal injury as well as fistula and other potential complications.    The patient and  I made a mutually shared decision ( shared decision making model ) that the patient would be best served by proceeding with the above invasive heart procedure.  This is a high risk invasive cardiac procedure which comes with significant risk of morbidity and mortality.  This patient has significant underlying  heart disease.  Arash Simmons understands these risks and   has made an informed decision to proceed.    Hartmann?    Rhythmia.  Stop sotalol 3 days beforehand.  Uninterrupted anticoagulation.     Body mass index is 30.99 kg/m².    I spent 48 minutes in consultation with this patient which included more than 65% of this time in direct face-to-face counseling, physical examination and discussion of my assessment and findings and shared decision making with the patient.  The remainder of the time not spent face to face was performing one, some or all of the following actions:  preparing to see this patient ( eg. Review of tests),  ordering medications, tests or procedures ), care coordination, discussion of the plan with other healthcare providers, documenting clinical information in Epic well as independently interpreting results and communicating results to patient, family and or caregiver.  All time noted occurred on the date of service.    Follow Up:       Thank you for allowing me to participate in the care of your patient. Please to not hesitate to contact me with additional questions or concerns.      Sal Verdugo, DO, FACC, RS  Cardiac Electrophysiologist  [facility]

## 2022-03-23 ENCOUNTER — PREP FOR SURGERY (OUTPATIENT)
Dept: OTHER | Facility: HOSPITAL | Age: 62
End: 2022-03-23

## 2022-03-23 DIAGNOSIS — I48.0 PAROXYSMAL ATRIAL FIBRILLATION: Primary | ICD-10-CM

## 2022-03-23 RX ORDER — ACETAMINOPHEN 325 MG/1
650 TABLET ORAL EVERY 4 HOURS PRN
Status: CANCELLED | OUTPATIENT
Start: 2022-03-23

## 2022-03-23 RX ORDER — ONDANSETRON 2 MG/ML
4 INJECTION INTRAMUSCULAR; INTRAVENOUS EVERY 6 HOURS PRN
Status: CANCELLED | OUTPATIENT
Start: 2022-03-23

## 2022-03-23 RX ORDER — NITROGLYCERIN 0.4 MG/1
0.4 TABLET SUBLINGUAL
Status: CANCELLED | OUTPATIENT
Start: 2022-03-23

## 2022-03-24 ENCOUNTER — TELEPHONE (OUTPATIENT)
Dept: FAMILY MEDICINE CLINIC | Facility: CLINIC | Age: 62
End: 2022-03-24

## 2022-03-24 NOTE — TELEPHONE ENCOUNTER
Hub staff attempted to follow warm transfer process and was unsuccessful     Caller: Arash Simmons    Relationship to patient: Self    Best call back number:[phone] (H)    Patient is needing: ARASH CALLED SAYING HIS CARDIOLOGIST PUT IN AN ORDER FOR AN EKG-THAT HIS PRIMARY WILL NEED TO SCHEDULE IT.  I CALLED THE PCP OFFICE AND WAS TOLD THE ORDER IS IN BUT CENTRAL SCHEDULING NEEDS TO DO THIS,  I TRANSFERRED HIM TO SCHEDULING [phone].  HE CALLED BACK AGAIN AND TOLD ME SCHEDULING TOLD HIM THIS HAS TO GO THROUGH HIS PCP.      I TRANSFERRED BACK TO HIS PCP BUT SOMEHOW THE CALL DIDN'T GET TRANSFERRED.  I CALLED HIM BACK TWICE [phone], EACH TIME THE CALL WENT TO VOICE MAIL.  PLEASE FOLLOW UP WITH ARASH.

## 2022-03-25 ENCOUNTER — CLINICAL SUPPORT (OUTPATIENT)
Dept: CARDIOLOGY | Facility: CLINIC | Age: 62
End: 2022-03-25

## 2022-03-25 DIAGNOSIS — I48.0 PAF (PAROXYSMAL ATRIAL FIBRILLATION): Primary | ICD-10-CM

## 2022-03-25 NOTE — TELEPHONE ENCOUNTER
No, we do not do that.  He would need an appointment for us to do EKG or he can go to his cardiologist for this.

## 2022-04-05 ENCOUNTER — TELEPHONE (OUTPATIENT)
Dept: CARDIOLOGY | Facility: CLINIC | Age: 62
End: 2022-04-05

## 2022-04-06 RX ORDER — FUROSEMIDE 40 MG/1
TABLET ORAL
Qty: 30 TABLET | Refills: 0 | Status: SHIPPED | OUTPATIENT
Start: 2022-04-06 | End: 2022-05-06

## 2022-04-08 ENCOUNTER — OFFICE VISIT (OUTPATIENT)
Dept: FAMILY MEDICINE CLINIC | Facility: CLINIC | Age: 62
End: 2022-04-08

## 2022-04-08 VITALS
SYSTOLIC BLOOD PRESSURE: 120 MMHG | RESPIRATION RATE: 18 BRPM | HEART RATE: 70 BPM | WEIGHT: 214 LBS | TEMPERATURE: 97.3 F | DIASTOLIC BLOOD PRESSURE: 88 MMHG | HEIGHT: 67 IN | BODY MASS INDEX: 33.59 KG/M2

## 2022-04-08 DIAGNOSIS — I50.22 CHRONIC SYSTOLIC CONGESTIVE HEART FAILURE: Primary | ICD-10-CM

## 2022-04-08 DIAGNOSIS — I10 ESSENTIAL HYPERTENSION: ICD-10-CM

## 2022-04-08 DIAGNOSIS — F41.9 ANXIETY: ICD-10-CM

## 2022-04-08 DIAGNOSIS — E78.00 PURE HYPERCHOLESTEROLEMIA: ICD-10-CM

## 2022-04-08 DIAGNOSIS — Z12.5 SCREENING FOR PROSTATE CANCER: ICD-10-CM

## 2022-04-08 PROCEDURE — 99214 OFFICE O/P EST MOD 30 MIN: CPT | Performed by: FAMILY MEDICINE

## 2022-04-08 RX ORDER — SPIRONOLACTONE 25 MG/1
25 TABLET ORAL DAILY
Qty: 90 TABLET | Refills: 1 | Status: SHIPPED | OUTPATIENT
Start: 2022-04-08 | End: 2022-05-06

## 2022-04-08 RX ORDER — FLUOXETINE 20 MG/1
40 TABLET, FILM COATED ORAL DAILY
Qty: 180 TABLET | Refills: 1 | Status: SHIPPED | OUTPATIENT
Start: 2022-04-08 | End: 2022-11-08 | Stop reason: SDUPTHER

## 2022-04-08 RX ORDER — SACUBITRIL AND VALSARTAN 24; 26 MG/1; MG/1
1 TABLET, FILM COATED ORAL EVERY 12 HOURS
Qty: 180 TABLET | Refills: 1 | Status: SHIPPED | OUTPATIENT
Start: 2022-04-08 | End: 2022-10-21

## 2022-04-08 RX ORDER — ATORVASTATIN CALCIUM 10 MG/1
10 TABLET, FILM COATED ORAL NIGHTLY
Qty: 90 TABLET | Refills: 1 | Status: SHIPPED | OUTPATIENT
Start: 2022-04-08 | End: 2022-11-08 | Stop reason: SDUPTHER

## 2022-04-08 NOTE — PROGRESS NOTES
Subjective   Arash Simmons is a 61 y.o. male.     History of Present Illness     He is going to have a procedure with his cardiologist as he continues to have irregular hear beats  He has felt tired since the last time his heart was shocked by his pacemaker  He sometimes will not sleep through the night, various reasons cause poor sleep but has felt different since last cardiac event       Arash Simmons returns today for follow up of Hyperlipidemia  Arash indicates his exercise level as irregularly.  Diet: treying to eat better  Patient is compliant with medications   Any side effects to medications:   chest pain No myalgia No memory change No  Pt is due for labs        Mood has been doing ok on prozac during the day but has been more anxious in the PM  He notes that his mind is thinking about things when he wakes up and then hard to go back to sleep      The following portions of the patient's history were reviewed and updated as appropriate: allergies, current medications, past family history, past medical history, past social history, past surgical history and problem list.    Review of Systems   Constitutional: Negative.    Respiratory: Negative.    Cardiovascular: Positive for palpitations (none recently).   Psychiatric/Behavioral: Negative.        Objective   Physical Exam  Vitals and nursing note reviewed.   Constitutional:       General: He is not in acute distress.     Appearance: Normal appearance. He is well-developed.   Cardiovascular:      Rate and Rhythm: Normal rate and regular rhythm.      Heart sounds: Normal heart sounds.   Pulmonary:      Effort: Pulmonary effort is normal.      Breath sounds: Normal breath sounds.   Neurological:      Mental Status: He is alert and oriented to person, place, and time.   Psychiatric:         Mood and Affect: Mood normal.         Behavior: Behavior normal.         Thought Content: Thought content normal.         Judgment: Judgment normal.          Assessment/Plan   Diagnoses and all orders for this visit:    1. Chronic systolic congestive heart failure (HCC) (Primary)  -     sacubitril-valsartan (Entresto) 24-26 MG tablet; Take 1 tablet by mouth Every 12 (Twelve) Hours.  Dispense: 180 tablet; Refill: 1  -     spironolactone (ALDACTONE) 25 MG tablet; Take 1 tablet by mouth Daily.  Dispense: 90 tablet; Refill: 1    2. Essential hypertension  -     spironolactone (ALDACTONE) 25 MG tablet; Take 1 tablet by mouth Daily.  Dispense: 90 tablet; Refill: 1  -     CBC & Differential  -     Comprehensive Metabolic Panel    3. Pure hypercholesterolemia  -     atorvastatin (LIPITOR) 10 MG tablet; Take 1 tablet by mouth Every Night.  Dispense: 90 tablet; Refill: 1  -     Comprehensive Metabolic Panel  -     Lipid Panel    4. Anxiety  -     FLUoxetine (PROzac) 20 MG tablet; Take 2 tablets by mouth Daily.  Dispense: 180 tablet; Refill: 1    5. Screening for prostate cancer  -     PSA Screen      he is going to undergo ablation in early May for his A-fib, defer medical management to cardiologist    Jamel is still stressed, will increase prozac from 20 to 40 and see if this helps his anxiety better    recheck lipids and continue lipitor 10

## 2022-04-09 LAB
ALBUMIN SERPL-MCNC: 4.9 G/DL (ref 3.5–5.2)
ALBUMIN/GLOB SERPL: 1.8 G/DL
ALP SERPL-CCNC: 84 U/L (ref 39–117)
ALT SERPL-CCNC: 15 U/L (ref 1–41)
AST SERPL-CCNC: 16 U/L (ref 1–40)
BASOPHILS # BLD AUTO: 0.05 10*3/MM3 (ref 0–0.2)
BASOPHILS NFR BLD AUTO: 0.7 % (ref 0–1.5)
BILIRUB SERPL-MCNC: 0.8 MG/DL (ref 0–1.2)
BUN SERPL-MCNC: 15 MG/DL (ref 8–23)
BUN/CREAT SERPL: 14.6 (ref 7–25)
CALCIUM SERPL-MCNC: 10 MG/DL (ref 8.6–10.5)
CHLORIDE SERPL-SCNC: 100 MMOL/L (ref 98–107)
CHOLEST SERPL-MCNC: 170 MG/DL (ref 0–200)
CO2 SERPL-SCNC: 25.8 MMOL/L (ref 22–29)
CREAT SERPL-MCNC: 1.03 MG/DL (ref 0.76–1.27)
EGFRCR SERPLBLD CKD-EPI 2021: 83.2 ML/MIN/1.73
EOSINOPHIL # BLD AUTO: 0.24 10*3/MM3 (ref 0–0.4)
EOSINOPHIL NFR BLD AUTO: 3.4 % (ref 0.3–6.2)
ERYTHROCYTE [DISTWIDTH] IN BLOOD BY AUTOMATED COUNT: 12.3 % (ref 12.3–15.4)
GLOBULIN SER CALC-MCNC: 2.7 GM/DL
GLUCOSE SERPL-MCNC: 101 MG/DL (ref 65–99)
HCT VFR BLD AUTO: 42.9 % (ref 37.5–51)
HDLC SERPL-MCNC: 44 MG/DL (ref 40–60)
HGB BLD-MCNC: 14.4 G/DL (ref 13–17.7)
IMM GRANULOCYTES # BLD AUTO: 0.02 10*3/MM3 (ref 0–0.05)
IMM GRANULOCYTES NFR BLD AUTO: 0.3 % (ref 0–0.5)
LDLC SERPL CALC-MCNC: 109 MG/DL (ref 0–100)
LYMPHOCYTES # BLD AUTO: 2.25 10*3/MM3 (ref 0.7–3.1)
LYMPHOCYTES NFR BLD AUTO: 31.7 % (ref 19.6–45.3)
MCH RBC QN AUTO: 31.1 PG (ref 26.6–33)
MCHC RBC AUTO-ENTMCNC: 33.6 G/DL (ref 31.5–35.7)
MCV RBC AUTO: 92.7 FL (ref 79–97)
MONOCYTES # BLD AUTO: 0.71 10*3/MM3 (ref 0.1–0.9)
MONOCYTES NFR BLD AUTO: 10 % (ref 5–12)
NEUTROPHILS # BLD AUTO: 3.83 10*3/MM3 (ref 1.7–7)
NEUTROPHILS NFR BLD AUTO: 53.9 % (ref 42.7–76)
NRBC BLD AUTO-RTO: 0 /100 WBC (ref 0–0.2)
PLATELET # BLD AUTO: 288 10*3/MM3 (ref 140–450)
POTASSIUM SERPL-SCNC: 4.7 MMOL/L (ref 3.5–5.2)
PROT SERPL-MCNC: 7.6 G/DL (ref 6–8.5)
PSA SERPL-MCNC: 0.92 NG/ML (ref 0–4)
RBC # BLD AUTO: 4.63 10*6/MM3 (ref 4.14–5.8)
SODIUM SERPL-SCNC: 138 MMOL/L (ref 136–145)
TRIGL SERPL-MCNC: 93 MG/DL (ref 0–150)
VLDLC SERPL CALC-MCNC: 17 MG/DL (ref 5–40)
WBC # BLD AUTO: 7.1 10*3/MM3 (ref 3.4–10.8)

## 2022-05-04 ENCOUNTER — PRE-ADMISSION TESTING (OUTPATIENT)
Dept: PREADMISSION TESTING | Facility: HOSPITAL | Age: 62
End: 2022-05-04

## 2022-05-04 ENCOUNTER — ANESTHESIA EVENT (OUTPATIENT)
Dept: CARDIOLOGY | Facility: HOSPITAL | Age: 62
End: 2022-05-04

## 2022-05-04 ENCOUNTER — HOSPITAL ENCOUNTER (OUTPATIENT)
Dept: CT IMAGING | Facility: HOSPITAL | Age: 62
Discharge: HOME | End: 2022-05-04

## 2022-05-04 DIAGNOSIS — I47.1 PAROXYSMAL SVT (SUPRAVENTRICULAR TACHYCARDIA): Primary | ICD-10-CM

## 2022-05-04 DIAGNOSIS — I48.0 PAROXYSMAL ATRIAL FIBRILLATION: ICD-10-CM

## 2022-05-04 LAB
ANION GAP SERPL CALCULATED.3IONS-SCNC: 11 MMOL/L (ref 5–15)
BUN SERPL-MCNC: 17 MG/DL (ref 8–23)
BUN/CREAT SERPL: 18.3 (ref 7–25)
CALCIUM SPEC-SCNC: 9.9 MG/DL (ref 8.6–10.5)
CHLORIDE SERPL-SCNC: 101 MMOL/L (ref 98–107)
CO2 SERPL-SCNC: 27 MMOL/L (ref 22–29)
CREAT SERPL-MCNC: 0.93 MG/DL (ref 0.76–1.27)
DEPRECATED RDW RBC AUTO: 40.9 FL (ref 37–54)
EGFRCR SERPLBLD CKD-EPI 2021: 94 ML/MIN/1.73
ERYTHROCYTE [DISTWIDTH] IN BLOOD BY AUTOMATED COUNT: 11.9 % (ref 12.3–15.4)
GLUCOSE SERPL-MCNC: 102 MG/DL (ref 65–99)
HCT VFR BLD AUTO: 40.4 % (ref 37.5–51)
HGB BLD-MCNC: 13.6 G/DL (ref 13–17.7)
MAGNESIUM SERPL-MCNC: 2.1 MG/DL (ref 1.6–2.4)
MCH RBC QN AUTO: 31.5 PG (ref 26.6–33)
MCHC RBC AUTO-ENTMCNC: 33.7 G/DL (ref 31.5–35.7)
MCV RBC AUTO: 93.5 FL (ref 79–97)
PLATELET # BLD AUTO: 257 10*3/MM3 (ref 140–450)
PMV BLD AUTO: 9.3 FL (ref 6–12)
POTASSIUM SERPL-SCNC: 4.5 MMOL/L (ref 3.5–5.2)
RBC # BLD AUTO: 4.32 10*6/MM3 (ref 4.14–5.8)
SODIUM SERPL-SCNC: 139 MMOL/L (ref 136–145)
TSH SERPL DL<=0.05 MIU/L-ACNC: 2.48 UIU/ML (ref 0.27–4.2)
WBC NRBC COR # BLD: 7.64 10*3/MM3 (ref 3.4–10.8)

## 2022-05-04 PROCEDURE — 83735 ASSAY OF MAGNESIUM: CPT

## 2022-05-04 PROCEDURE — 36415 COLL VENOUS BLD VENIPUNCTURE: CPT

## 2022-05-04 PROCEDURE — 0 IOPAMIDOL PER 1 ML: Performed by: INTERNAL MEDICINE

## 2022-05-04 PROCEDURE — 84443 ASSAY THYROID STIM HORMONE: CPT

## 2022-05-04 PROCEDURE — 71275 CT ANGIOGRAPHY CHEST: CPT

## 2022-05-04 PROCEDURE — 85027 COMPLETE CBC AUTOMATED: CPT

## 2022-05-04 PROCEDURE — 80048 BASIC METABOLIC PNL TOTAL CA: CPT

## 2022-05-04 RX ADMIN — IOPAMIDOL 80 ML: 755 INJECTION, SOLUTION INTRAVENOUS at 15:08

## 2022-05-04 NOTE — PAT
An arrival time for procedure was not given during PAT visit. If patient had any questions or concerns about their arrival time, they were instructed to contact their surgeon/physician.  Additionally, if the patient referred to an arrival time that was acquired from their my chart account, patient was encouraged to verify that time with their surgeon/physician.  NO arrival times given in Pre Admission Testing Department.    PT WAS INSTRUCTED TO STOP SOTALOL 3 DAYS PRIOR TO PROCEDURE. LAST DOSE WAS 5/1/22.    EKG FROM 3/21/22 & 3/25/22 ON CHART AND IN Epic.

## 2022-05-05 ENCOUNTER — HOSPITAL ENCOUNTER (OUTPATIENT)
Facility: HOSPITAL | Age: 62
Discharge: HOME | End: 2022-05-05
Attending: INTERNAL MEDICINE | Admitting: INTERNAL MEDICINE

## 2022-05-05 ENCOUNTER — ANESTHESIA (OUTPATIENT)
Dept: CARDIOLOGY | Facility: HOSPITAL | Age: 62
End: 2022-05-05

## 2022-05-05 VITALS
BODY MASS INDEX: 30.17 KG/M2 | OXYGEN SATURATION: 97 % | WEIGHT: 210.76 LBS | HEIGHT: 70 IN | RESPIRATION RATE: 16 BRPM | TEMPERATURE: 97.7 F | HEART RATE: 89 BPM | SYSTOLIC BLOOD PRESSURE: 107 MMHG | DIASTOLIC BLOOD PRESSURE: 76 MMHG

## 2022-05-05 DIAGNOSIS — I48.0 PAROXYSMAL ATRIAL FIBRILLATION: ICD-10-CM

## 2022-05-05 LAB
ACT BLD: 297 SECONDS (ref 82–152)
ACT BLD: 356 SECONDS (ref 82–152)
ACT BLD: 356 SECONDS (ref 82–152)
ACT BLD: 368 SECONDS (ref 82–152)
SARS-COV-2 RDRP RESP QL NAA+PROBE: NORMAL

## 2022-05-05 PROCEDURE — 93005 ELECTROCARDIOGRAM TRACING: CPT | Performed by: ANESTHESIOLOGY

## 2022-05-05 PROCEDURE — C9803 HOPD COVID-19 SPEC COLLECT: HCPCS

## 2022-05-05 PROCEDURE — 93622 COMP EP EVAL L VENTR PAC&REC: CPT | Performed by: INTERNAL MEDICINE

## 2022-05-05 PROCEDURE — 25010000002 FUROSEMIDE PER 20 MG: Performed by: NURSE ANESTHETIST, CERTIFIED REGISTERED

## 2022-05-05 PROCEDURE — C1760 CLOSURE DEV, VASC: HCPCS | Performed by: INTERNAL MEDICINE

## 2022-05-05 PROCEDURE — 93623 PRGRMD STIMJ&PACG IV RX NFS: CPT | Performed by: INTERNAL MEDICINE

## 2022-05-05 PROCEDURE — C1730 CATH, EP, 19 OR FEW ELECT: HCPCS | Performed by: INTERNAL MEDICINE

## 2022-05-05 PROCEDURE — 87635 SARS-COV-2 COVID-19 AMP PRB: CPT | Performed by: INTERNAL MEDICINE

## 2022-05-05 PROCEDURE — C1893 INTRO/SHEATH, FIXED,NON-PEEL: HCPCS | Performed by: INTERNAL MEDICINE

## 2022-05-05 PROCEDURE — C1732 CATH, EP, DIAG/ABL, 3D/VECT: HCPCS | Performed by: INTERNAL MEDICINE

## 2022-05-05 PROCEDURE — 25010000002 DEXAMETHASONE PER 1 MG: Performed by: NURSE ANESTHETIST, CERTIFIED REGISTERED

## 2022-05-05 PROCEDURE — 25010000002 FENTANYL CITRATE (PF) 50 MCG/ML SOLUTION: Performed by: NURSE ANESTHETIST, CERTIFIED REGISTERED

## 2022-05-05 PROCEDURE — 93656 COMPRE EP EVAL ABLTJ ATR FIB: CPT | Performed by: INTERNAL MEDICINE

## 2022-05-05 PROCEDURE — 93010 ELECTROCARDIOGRAM REPORT: CPT | Performed by: INTERNAL MEDICINE

## 2022-05-05 PROCEDURE — C1894 INTRO/SHEATH, NON-LASER: HCPCS | Performed by: INTERNAL MEDICINE

## 2022-05-05 PROCEDURE — 25010000002 PROPOFOL 10 MG/ML EMULSION: Performed by: NURSE ANESTHETIST, CERTIFIED REGISTERED

## 2022-05-05 PROCEDURE — 85347 COAGULATION TIME ACTIVATED: CPT

## 2022-05-05 PROCEDURE — C1766 INTRO/SHEATH,STRBLE,NON-PEEL: HCPCS | Performed by: INTERNAL MEDICINE

## 2022-05-05 PROCEDURE — 25010000002 NEOSTIGMINE 10 MG/10ML SOLUTION: Performed by: NURSE ANESTHETIST, CERTIFIED REGISTERED

## 2022-05-05 PROCEDURE — 25010000002 PROTAMINE SULFATE PER 10 MG: Performed by: INTERNAL MEDICINE

## 2022-05-05 PROCEDURE — 25010000002 KETOROLAC TROMETHAMINE PER 15 MG: Performed by: INTERNAL MEDICINE

## 2022-05-05 PROCEDURE — 25010000002 ONDANSETRON PER 1 MG: Performed by: NURSE ANESTHETIST, CERTIFIED REGISTERED

## 2022-05-05 PROCEDURE — 25010000002 HEPARIN (PORCINE) PER 1000 UNITS: Performed by: INTERNAL MEDICINE

## 2022-05-05 PROCEDURE — C1759 CATH, INTRA ECHOCARDIOGRAPHY: HCPCS | Performed by: INTERNAL MEDICINE

## 2022-05-05 PROCEDURE — C1769 GUIDE WIRE: HCPCS | Performed by: INTERNAL MEDICINE

## 2022-05-05 PROCEDURE — S0260 H&P FOR SURGERY: HCPCS | Performed by: NURSE PRACTITIONER

## 2022-05-05 PROCEDURE — C1760 CLOSURE DEV, VASC: HCPCS

## 2022-05-05 RX ORDER — SODIUM CHLORIDE 0.9 % (FLUSH) 0.9 %
10 SYRINGE (ML) INJECTION AS NEEDED
Status: DISCONTINUED | OUTPATIENT
Start: 2022-05-05 | End: 2022-05-05 | Stop reason: HOSPADM

## 2022-05-05 RX ORDER — HEPARIN SODIUM 1000 [USP'U]/ML
INJECTION, SOLUTION INTRAVENOUS; SUBCUTANEOUS AS NEEDED
Status: DISCONTINUED | OUTPATIENT
Start: 2022-05-05 | End: 2022-05-05 | Stop reason: HOSPADM

## 2022-05-05 RX ORDER — PANTOPRAZOLE SODIUM 40 MG/1
40 TABLET, DELAYED RELEASE ORAL
Status: DISCONTINUED | OUTPATIENT
Start: 2022-05-06 | End: 2022-05-05 | Stop reason: HOSPADM

## 2022-05-05 RX ORDER — FAMOTIDINE 20 MG/1
20 TABLET, FILM COATED ORAL ONCE
Status: DISCONTINUED | OUTPATIENT
Start: 2022-05-05 | End: 2022-05-05 | Stop reason: HOSPADM

## 2022-05-05 RX ORDER — ROCURONIUM BROMIDE 10 MG/ML
INJECTION, SOLUTION INTRAVENOUS AS NEEDED
Status: DISCONTINUED | OUTPATIENT
Start: 2022-05-05 | End: 2022-05-05 | Stop reason: SURG

## 2022-05-05 RX ORDER — SPIRONOLACTONE 25 MG/1
25 TABLET ORAL DAILY
Status: DISCONTINUED | OUTPATIENT
Start: 2022-05-05 | End: 2022-05-05 | Stop reason: HOSPADM

## 2022-05-05 RX ORDER — SODIUM CHLORIDE 0.9 % (FLUSH) 0.9 %
10 SYRINGE (ML) INJECTION EVERY 12 HOURS SCHEDULED
Status: DISCONTINUED | OUTPATIENT
Start: 2022-05-05 | End: 2022-05-05 | Stop reason: HOSPADM

## 2022-05-05 RX ORDER — PROPOFOL 10 MG/ML
VIAL (ML) INTRAVENOUS AS NEEDED
Status: DISCONTINUED | OUTPATIENT
Start: 2022-05-05 | End: 2022-05-05 | Stop reason: SURG

## 2022-05-05 RX ORDER — ACETAMINOPHEN 325 MG/1
650 TABLET ORAL ONCE AS NEEDED
Status: DISCONTINUED | OUTPATIENT
Start: 2022-05-05 | End: 2022-05-05 | Stop reason: HOSPADM

## 2022-05-05 RX ORDER — FUROSEMIDE 10 MG/ML
INJECTION INTRAMUSCULAR; INTRAVENOUS AS NEEDED
Status: DISCONTINUED | OUTPATIENT
Start: 2022-05-05 | End: 2022-05-05 | Stop reason: SURG

## 2022-05-05 RX ORDER — ATORVASTATIN CALCIUM 10 MG/1
10 TABLET, FILM COATED ORAL NIGHTLY
Status: DISCONTINUED | OUTPATIENT
Start: 2022-05-05 | End: 2022-05-05 | Stop reason: HOSPADM

## 2022-05-05 RX ORDER — MEPERIDINE HYDROCHLORIDE 25 MG/ML
12.5 INJECTION INTRAMUSCULAR; INTRAVENOUS; SUBCUTANEOUS
Status: DISCONTINUED | OUTPATIENT
Start: 2022-05-05 | End: 2022-05-05 | Stop reason: HOSPADM

## 2022-05-05 RX ORDER — ACETAMINOPHEN 650 MG/1
650 SUPPOSITORY RECTAL ONCE AS NEEDED
Status: DISCONTINUED | OUTPATIENT
Start: 2022-05-05 | End: 2022-05-05 | Stop reason: HOSPADM

## 2022-05-05 RX ORDER — BUPIVACAINE HCL/0.9 % NACL/PF 0.125 %
PLASTIC BAG, INJECTION (ML) EPIDURAL AS NEEDED
Status: DISCONTINUED | OUTPATIENT
Start: 2022-05-05 | End: 2022-05-05 | Stop reason: SURG

## 2022-05-05 RX ORDER — ACETAMINOPHEN 325 MG/1
650 TABLET ORAL EVERY 4 HOURS PRN
Status: DISCONTINUED | OUTPATIENT
Start: 2022-05-05 | End: 2022-05-05 | Stop reason: HOSPADM

## 2022-05-05 RX ORDER — FENTANYL CITRATE 50 UG/ML
INJECTION, SOLUTION INTRAMUSCULAR; INTRAVENOUS AS NEEDED
Status: DISCONTINUED | OUTPATIENT
Start: 2022-05-05 | End: 2022-05-05 | Stop reason: SURG

## 2022-05-05 RX ORDER — FUROSEMIDE 10 MG/ML
INJECTION INTRAMUSCULAR; INTRAVENOUS
Status: DISCONTINUED
Start: 2022-05-05 | End: 2022-05-05 | Stop reason: HOSPADM

## 2022-05-05 RX ORDER — SODIUM CHLORIDE, SODIUM LACTATE, POTASSIUM CHLORIDE, CALCIUM CHLORIDE 600; 310; 30; 20 MG/100ML; MG/100ML; MG/100ML; MG/100ML
9 INJECTION, SOLUTION INTRAVENOUS CONTINUOUS
Status: DISCONTINUED | OUTPATIENT
Start: 2022-05-05 | End: 2022-05-05 | Stop reason: HOSPADM

## 2022-05-05 RX ORDER — FLUOXETINE HYDROCHLORIDE 20 MG/1
40 CAPSULE ORAL DAILY
Refills: 1 | Status: DISCONTINUED | OUTPATIENT
Start: 2022-05-05 | End: 2022-05-05 | Stop reason: HOSPADM

## 2022-05-05 RX ORDER — DEXAMETHASONE SODIUM PHOSPHATE 4 MG/ML
INJECTION, SOLUTION INTRA-ARTICULAR; INTRALESIONAL; INTRAMUSCULAR; INTRAVENOUS; SOFT TISSUE AS NEEDED
Status: DISCONTINUED | OUTPATIENT
Start: 2022-05-05 | End: 2022-05-05 | Stop reason: SURG

## 2022-05-05 RX ORDER — GLYCOPYRROLATE 0.2 MG/ML
INJECTION INTRAMUSCULAR; INTRAVENOUS AS NEEDED
Status: DISCONTINUED | OUTPATIENT
Start: 2022-05-05 | End: 2022-05-05 | Stop reason: SURG

## 2022-05-05 RX ORDER — NEOSTIGMINE METHYLSULFATE 1 MG/ML
INJECTION, SOLUTION INTRAVENOUS AS NEEDED
Status: DISCONTINUED | OUTPATIENT
Start: 2022-05-05 | End: 2022-05-05 | Stop reason: SURG

## 2022-05-05 RX ORDER — ONDANSETRON 2 MG/ML
INJECTION INTRAMUSCULAR; INTRAVENOUS AS NEEDED
Status: DISCONTINUED | OUTPATIENT
Start: 2022-05-05 | End: 2022-05-05 | Stop reason: SURG

## 2022-05-05 RX ORDER — KETOROLAC TROMETHAMINE 15 MG/ML
15 INJECTION, SOLUTION INTRAMUSCULAR; INTRAVENOUS EVERY 8 HOURS
Status: DISCONTINUED | OUTPATIENT
Start: 2022-05-05 | End: 2022-05-05 | Stop reason: HOSPADM

## 2022-05-05 RX ORDER — FUROSEMIDE 10 MG/ML
INJECTION INTRAMUSCULAR; INTRAVENOUS
Status: COMPLETED
Start: 2022-05-05 | End: 2022-05-05

## 2022-05-05 RX ORDER — MIDAZOLAM HYDROCHLORIDE 1 MG/ML
1 INJECTION INTRAMUSCULAR; INTRAVENOUS
Status: DISCONTINUED | OUTPATIENT
Start: 2022-05-05 | End: 2022-05-05 | Stop reason: HOSPADM

## 2022-05-05 RX ORDER — HEPARIN SODIUM 10000 [USP'U]/100ML
INJECTION, SOLUTION INTRAVENOUS CONTINUOUS PRN
Status: COMPLETED | OUTPATIENT
Start: 2022-05-05 | End: 2022-05-05

## 2022-05-05 RX ORDER — ONDANSETRON 2 MG/ML
4 INJECTION INTRAMUSCULAR; INTRAVENOUS ONCE AS NEEDED
Status: DISCONTINUED | OUTPATIENT
Start: 2022-05-05 | End: 2022-05-05 | Stop reason: HOSPADM

## 2022-05-05 RX ORDER — METOPROLOL SUCCINATE 50 MG/1
50 TABLET, EXTENDED RELEASE ORAL
Status: DISCONTINUED | OUTPATIENT
Start: 2022-05-05 | End: 2022-05-05 | Stop reason: HOSPADM

## 2022-05-05 RX ORDER — LIDOCAINE HYDROCHLORIDE 10 MG/ML
0.5 INJECTION, SOLUTION EPIDURAL; INFILTRATION; INTRACAUDAL; PERINEURAL ONCE AS NEEDED
Status: DISCONTINUED | OUTPATIENT
Start: 2022-05-05 | End: 2022-05-05 | Stop reason: HOSPADM

## 2022-05-05 RX ORDER — PROTAMINE SULFATE 10 MG/ML
INJECTION, SOLUTION INTRAVENOUS AS NEEDED
Status: DISCONTINUED | OUTPATIENT
Start: 2022-05-05 | End: 2022-05-05 | Stop reason: HOSPADM

## 2022-05-05 RX ORDER — NITROGLYCERIN 0.4 MG/1
0.4 TABLET SUBLINGUAL
Status: DISCONTINUED | OUTPATIENT
Start: 2022-05-05 | End: 2022-05-05 | Stop reason: HOSPADM

## 2022-05-05 RX ORDER — SODIUM CHLORIDE 9 MG/ML
INJECTION, SOLUTION INTRAVENOUS CONTINUOUS PRN
Status: COMPLETED | OUTPATIENT
Start: 2022-05-05 | End: 2022-05-05

## 2022-05-05 RX ORDER — ONDANSETRON 2 MG/ML
4 INJECTION INTRAMUSCULAR; INTRAVENOUS EVERY 6 HOURS PRN
Status: DISCONTINUED | OUTPATIENT
Start: 2022-05-05 | End: 2022-05-05 | Stop reason: HOSPADM

## 2022-05-05 RX ORDER — METOPROLOL SUCCINATE 50 MG/1
50 TABLET, EXTENDED RELEASE ORAL DAILY
Qty: 90 TABLET | Refills: 3 | Status: SHIPPED | OUTPATIENT
Start: 2022-05-05 | End: 2023-01-20 | Stop reason: SDUPTHER

## 2022-05-05 RX ORDER — FENTANYL CITRATE 50 UG/ML
50 INJECTION, SOLUTION INTRAMUSCULAR; INTRAVENOUS
Status: DISCONTINUED | OUTPATIENT
Start: 2022-05-05 | End: 2022-05-05 | Stop reason: HOSPADM

## 2022-05-05 RX ORDER — OXYCODONE HYDROCHLORIDE AND ACETAMINOPHEN 5; 325 MG/1; MG/1
1 TABLET ORAL ONCE AS NEEDED
Status: DISCONTINUED | OUTPATIENT
Start: 2022-05-05 | End: 2022-05-05 | Stop reason: HOSPADM

## 2022-05-05 RX ORDER — FAMOTIDINE 10 MG/ML
20 INJECTION, SOLUTION INTRAVENOUS ONCE
Status: DISCONTINUED | OUTPATIENT
Start: 2022-05-05 | End: 2022-05-05 | Stop reason: HOSPADM

## 2022-05-05 RX ORDER — PANTOPRAZOLE SODIUM 40 MG/1
40 TABLET, DELAYED RELEASE ORAL DAILY
Qty: 90 TABLET | Refills: 0 | Status: SHIPPED | OUTPATIENT
Start: 2022-05-05 | End: 2022-12-06

## 2022-05-05 RX ORDER — LIDOCAINE HYDROCHLORIDE 10 MG/ML
INJECTION, SOLUTION EPIDURAL; INFILTRATION; INTRACAUDAL; PERINEURAL AS NEEDED
Status: DISCONTINUED | OUTPATIENT
Start: 2022-05-05 | End: 2022-05-05 | Stop reason: SURG

## 2022-05-05 RX ORDER — IPRATROPIUM BROMIDE AND ALBUTEROL SULFATE 2.5; .5 MG/3ML; MG/3ML
3 SOLUTION RESPIRATORY (INHALATION) ONCE AS NEEDED
Status: DISCONTINUED | OUTPATIENT
Start: 2022-05-05 | End: 2022-05-05 | Stop reason: HOSPADM

## 2022-05-05 RX ADMIN — PROPOFOL 150 MG: 10 INJECTION, EMULSION INTRAVENOUS at 08:13

## 2022-05-05 RX ADMIN — DEXAMETHASONE SODIUM PHOSPHATE 8 MG: 4 INJECTION INTRA-ARTICULAR; INTRALESIONAL; INTRAMUSCULAR; INTRAVENOUS; SOFT TISSUE at 08:58

## 2022-05-05 RX ADMIN — ROCURONIUM BROMIDE 70 MG: 10 INJECTION INTRAVENOUS at 08:13

## 2022-05-05 RX ADMIN — ONDANSETRON 4 MG: 2 INJECTION INTRAMUSCULAR; INTRAVENOUS at 11:13

## 2022-05-05 RX ADMIN — FENTANYL CITRATE 50 MCG: 50 INJECTION, SOLUTION INTRAMUSCULAR; INTRAVENOUS at 08:01

## 2022-05-05 RX ADMIN — GLYCOPYRROLATE 0.4 MG: 0.2 INJECTION INTRAMUSCULAR; INTRAVENOUS at 11:23

## 2022-05-05 RX ADMIN — Medication 160 MCG: at 08:39

## 2022-05-05 RX ADMIN — FUROSEMIDE 60 MG: 10 INJECTION, SOLUTION INTRAMUSCULAR; INTRAVENOUS at 11:42

## 2022-05-05 RX ADMIN — LIDOCAINE HYDROCHLORIDE 50 MG: 10 INJECTION, SOLUTION EPIDURAL; INFILTRATION; INTRACAUDAL; PERINEURAL at 08:13

## 2022-05-05 RX ADMIN — NEOSTIGMINE METHYLSULFATE 3 MG: 0.5 INJECTION INTRAVENOUS at 11:23

## 2022-05-05 RX ADMIN — KETOROLAC TROMETHAMINE 15 MG: 15 INJECTION, SOLUTION INTRAMUSCULAR; INTRAVENOUS at 16:03

## 2022-05-05 NOTE — ANESTHESIA PROCEDURE NOTES
Airway  Urgency: elective    Date/Time: 5/5/2022 8:13 AM  End Time:5/5/2022 8:16 AM  Airway not difficult    General Information and Staff    Patient location during procedure: OR  CRNA/CAA: Sanchez Rice CRNA    Indications and Patient Condition  Indications for airway management: airway protection    Preoxygenated: yes  MILS not maintained throughout  Mask difficulty assessment: 1 - vent by mask    Final Airway Details  Final airway type: endotracheal airway      Successful airway: ETT  Cuffed: yes   Successful intubation technique: direct laryngoscopy  Facilitating devices/methods: intubating stylet and cricoid pressure  Endotracheal tube insertion site: oral  Blade: Sarah  Blade size: 3  ETT size (mm): 8.0  Cormack-Lehane Classification: grade I - full view of glottis  Placement verified by: chest auscultation and capnometry   Measured from: lips  ETT/EBT  to lips (cm): 23  Number of attempts at approach: 1  Assessment: lips, teeth, and gum same as pre-op and atraumatic intubation    Additional Comments  Negative epigastric sounds, Breath sound equal bilaterally with symmetric chest rise and fall

## 2022-05-05 NOTE — H&P
[facility] Consult/H&P Note      Referring Provider: Sal Verdugo DO  Primary Provider:  Sundeep Stone MD  Reason for Consultation: Afib    PROBLEM LIST:  1. Atrial fibrillation  1. CHADVSVASC = 2  2. Nonischemic cardiomyopathy / Systolic Congestive Heart Failure   a.  On 04/07/2015, abnormal myocardial perfusion study with evidence of dilated cardiomyopathy, ejection fraction of 16%, large fixed perfusion defect in the anterior apex, consistent with prior myocardial infarction.   b. On 05/08/2015, cardiac catheterization  with EF of 10% to 15%.  Normal coronary arteries.  Severe left ventricular dilatation.    c. Echo, 07/08/2015, LVEF 20%.   d. Status post biventricular ICD placement, August 2015.  e. 9/2020 echo EF 25-30% mild AR. Mild to mod MR. Read by Dr. Orozco.  3. Biventricular ICD, [employer], 8/2015  a. ICD shock 7/3/2020 due to SVT/atrial tachycardia at 220 bpm  4. Left bundle branch block.   5. Hypertension.   6. Dyslipidemia.   7. Anxiety.   8. Questionable sleep apnea.   9. Left knee replacement 2017      HISTORY OF PRESENT ILLNESS:  Arash Simmons is a 61 y.o. male with the above noted pmhx who presents today for PVA. He has a h/o Atrial fibrillation for which he has received several inappropriate ICD shocks. He is symptomatic in Afib with SOA. He was last seen in office for inappropriate ICD shocks in March at which time his sotalol was increased to 160 mg bid. He was given the option of AVN RFA versus PVA and opted for the later. He took last dose of Sotalol on Sunday in preparation for today's procedure. He has not missed any Eliquis in the last 30 days.       REVIEW OF SYSTEMS  Pertinent positives are listed in the HPI and all other ROS are negative.      SOCIAL HISTORY:   reports that he quit smoking about 22 years ago. He has a 60.00 pack-year smoking history. He has never used smokeless tobacco. He reports current alcohol use. He reports that he does not use drugs.  "    FAMILY HISTORY:  family history includes COPD in his father and mother; Diabetes in his mother; Emphysema in his mother; Other in his father.     CURRENT MEDICATIONS:      Current Facility-Administered Medications:   •  acetaminophen (TYLENOL) tablet 650 mg, 650 mg, Oral, Q4H PRN, Farhan Blackwood PA  •  famotidine (PEPCID) injection 20 mg, 20 mg, Intravenous, Once, Charles Freitas MD  •  famotidine (PEPCID) tablet 20 mg, 20 mg, Oral, Once, Charles Freitas MD  •  lactated ringers infusion, 9 mL/hr, Intravenous, Continuous, Charles Freitas MD  •  lidocaine PF 1% (XYLOCAINE) injection 0.5 mL, 0.5 mL, Injection, Once PRN, Charles Freitas MD  •  midazolam (VERSED) injection 1 mg, 1 mg, Intravenous, Q10 Min PRN, Charles Freitas MD  •  nitroglycerin (NITROSTAT) SL tablet 0.4 mg, 0.4 mg, Sublingual, Q5 Min PRN, Farhan Blackwood PA  •  ondansetron (ZOFRAN) injection 4 mg, 4 mg, Intravenous, Q6H PRN, Farhan Blackwood PA  •  sodium chloride 0.9 % flush 10 mL, 10 mL, Intravenous, Q12H, Charles Freitas MD  •  sodium chloride 0.9 % flush 10 mL, 10 mL, Intravenous, PRN, Charles Freitas MD     Allergies:  Patient has no known allergies.    Objective     Vital Sign Min/Max for last 24 hours  Temp  Min: 98.3 °F (36.8 °C)  Max: 98.3 °F (36.8 °C)   BP  Min: 138/78  Max: 144/81   Pulse  Min: 81  Max: 81   Resp  Min: 16  Max: 16   SpO2  Min: 96 %  Max: 96 %   No data recorded   Weight  Min: 95.6 kg (210 lb 12.2 oz)  Max: 95.6 kg (210 lb 12.2 oz)     Flowsheet Rows    Flowsheet Row First Filed Value   Admission Height 177.8 cm (70\") Documented at 05/05/2022 0641   Admission Weight 95.6 kg (210 lb 12.2 oz) Documented at 05/05/2022 0641          PHYSICAL EXAM:  GENERAL: This is a well-developed, well-nourished, male who is in no acute distress.   SKIN: Pink and warm without rash or abnormality noted.   HEENT: Head is normocephalic and atraumatic. Pupils are equal and reactive to light bilaterally. Mucous membranes are pink and " moist.   NECK: Supple without lymphadenopathy or thyromegaly. There is no jugular venous distention at 30°.  LUNGS: Clear to auscultation bilaterally without wheezing, rhonchi, or rales noted.   CARDIOVASCULAR: The heart has a regular rhythm, tachy rate with a normal S1 and S2. There is no murmur, gallop, rub, or click appreciated. The PMI is nondisplaced. Carotid upstrokes are 2+ and symmetrical without bruits.   ABDOMEN: Soft and nondistended with positive bowel sounds x4. The patient denies tenderness of palpitation.   MUSCULOSKELETAL: There are no obvious bony abnormalities. Normal range of tenderness to palpation.   NEUROLOGICAL: Nonfocal. Alert and oriented x3.   PERIPHERAL VASCULAR: Femoral pulses are 2+ and symmetrical without bruits. Posterior tibial and dorsalis pedis pulses are 2+ and symmetrical. There is no peripheral edema.   PSYCH: Normal mood and affect.      EKG:    Tele: Poor quality telemetry    LABS:      Lab Results   Component Value Date    WBC 7.64 05/04/2022    HGB 13.6 05/04/2022    HCT 40.4 05/04/2022    MCV 93.5 05/04/2022     05/04/2022     Lab Results   Component Value Date    GLUCOSE 102 (H) 05/04/2022    BUN 17 05/04/2022    CREATININE 0.93 05/04/2022    EGFRIFNONA 83 12/22/2021    EGFRIFAFRI 100 12/22/2021    BCR 18.3 05/04/2022    K 4.5 05/04/2022    CO2 27.0 05/04/2022    CALCIUM 9.9 05/04/2022    PROTENTOTREF 7.6 04/08/2022    ALBUMIN 4.90 04/08/2022    LABIL2 1.8 04/08/2022    AST 16 04/08/2022    ALT 15 04/08/2022     Lab Results   Component Value Date    TROPONINT <0.010 08/21/2021     Lab Results   Component Value Date    INR 0.99 10/11/2017    INR 1.02 08/20/2015    PROTIME 10.7 08/20/2015     Lab Results   Component Value Date    CHOL 139 02/02/2017    CHLPL 170 04/08/2022    TRIG 93 04/08/2022    HDL 44 04/08/2022     (H) 04/08/2022        Results Review: I reviewed the patient's new clinical results.      ASSESSMENT:    1.  Atrial fibrillation  a. Symptomatic  with SOA  b. S/p inappropriate ICD shocks for Afib with RVR  c. Last dose of Sotalol was last Sunday  d. No missed doses of Eliquis in the past 30 days.   Patient presents today for PVA.The risks, benefits, and alternatives of the procedure have been reviewed and the patient wishes to proceed.       PLAN:    1. Proceed as planned.       Electronically signed by TONY Rodaret, 05/05/22, 7:42 AM EDT.

## 2022-05-05 NOTE — ANESTHESIA POSTPROCEDURE EVALUATION
Patient: Arash Simmons    Procedure Summary     Date: 05/05/22 Room / Location: [facility] CATH/[facility] LAB [facility] / [facility] INVASIVE LOCATION    Anesthesia Start: 0801 Anesthesia Stop: 1144    Procedure: Ablation atrial fibrillation, Rhythmia. Stop Sotalol 3 days prior to procedure (N/A ) Diagnosis:       Paroxysmal atrial fibrillation (HCC)      (Afib)    Providers: Sal Verdugo DO Provider: Charles Freitas MD    Anesthesia Type: general ASA Status: 3          Anesthesia Type: general    Vitals  No vitals data found for the desired time range.          Post Anesthesia Care and Evaluation    Patient location during evaluation: PACU  Patient participation: complete - patient participated  Level of consciousness: awake and alert  Pain management: adequate  Airway patency: patent  Anesthetic complications: No anesthetic complications  PONV Status: none  Cardiovascular status: hemodynamically stable and acceptable  Respiratory status: nonlabored ventilation, acceptable and nasal cannula  Hydration status: acceptable       [Anxiety] : anxiety [Negative] : Heme/Lymph [de-identified] : controlled with meds

## 2022-05-05 NOTE — ANESTHESIA PREPROCEDURE EVALUATION
Anesthesia Evaluation     Patient summary reviewed and Nursing notes reviewed   NPO Solid Status: > 8 hours  NPO Liquid Status: > 8 hours           Airway   Dental      Pulmonary    (+) a smoker (22years) Former, shortness of breath,   (-) COPD, asthma, recent URI, sleep apnea  Cardiovascular     ECG reviewed    (+) pacemaker pacemaker, ICD, hypertension, dysrhythmias (also P SVT  LBBB ) Atrial Fib, CHF (Dilarted CM 2015 treated w Bi vent pacer) Systolic <55%, hyperlipidemia,   (-) past MI, CAD, cardiac stents    ROS comment: ECG AV seq PM dual chamber . LBBB   ECHO 2015 EF 15% sev dil CM  2020 p dual chamber PM Ef 25-30% Mod MVR Mild AR     Needed shock for SVT 7/20        Neuro/Psych  (+) numbness, psychiatric history,    (-) seizures, CVA    ROS Comment: ACD for cervical stenosis  GI/Hepatic/Renal/Endo    (-) no renal disease, diabetes, no thyroid disorder    Musculoskeletal     Abdominal    Substance History      OB/GYN          Other   arthritis,                      Anesthesia Plan    ASA 3     general   ([place] )  intravenous induction     Anesthetic plan, all risks, benefits, and alternatives have been provided, discussed and informed consent has been obtained with: patient.    Plan discussed with CRNA.        CODE STATUS:

## 2022-05-06 ENCOUNTER — CALL CENTER PROGRAMS (OUTPATIENT)
Dept: CALL CENTER | Facility: HOSPITAL | Age: 62
End: 2022-05-06

## 2022-05-06 DIAGNOSIS — I10 ESSENTIAL HYPERTENSION: ICD-10-CM

## 2022-05-06 DIAGNOSIS — I50.22 CHRONIC SYSTOLIC CONGESTIVE HEART FAILURE: ICD-10-CM

## 2022-05-06 RX ORDER — SPIRONOLACTONE 25 MG/1
TABLET ORAL
Qty: 90 TABLET | Refills: 0 | Status: SHIPPED | OUTPATIENT
Start: 2022-05-06 | End: 2022-11-08 | Stop reason: SDUPTHER

## 2022-05-06 RX ORDER — FUROSEMIDE 40 MG/1
TABLET ORAL
Qty: 30 TABLET | Refills: 0 | Status: SHIPPED | OUTPATIENT
Start: 2022-05-06 | End: 2022-06-08

## 2022-05-06 NOTE — OUTREACH NOTE
PCI/Device Survey    Flowsheet Row Responses   Facility patient discharged from? [facility]   Procedure date 05/05/22   Procedure (if device, specify in description) PCI   PCI site Right, Left, Groin   Performing MD Other (annotate)  [Dr Fallon]   Attempt successful? No   Unsuccessful attempts Attempt 1          TATYANA MORALES - Registered Nurse

## 2022-05-09 ENCOUNTER — CALL CENTER PROGRAMS (OUTPATIENT)
Dept: CALL CENTER | Facility: HOSPITAL | Age: 62
End: 2022-05-09

## 2022-05-09 NOTE — OUTREACH NOTE
PCI/Device Survey    Flowsheet Row Responses   Facility patient discharged from? [facility]   Procedure date 05/05/22   Procedure (if device, specify in description) PCI  [ABLATION ]   PCI site Right, Left, Groin   Performing MD Other (annotate)  [Dr. Sal Verdugo]   Attempt successful? Yes   Call start time 0833   Call end time 0836   Person spoke with today (if not patient) and relationship Alok-spouse    Has the patient had any of the following symptoms since discharge? --  [None noted]   Is the patient taking prescribed medications: --  [Eliquis]   Nursing intervention Reminded to continue to take prescribed medications   Does the patient have any of the following symptoms related to the cath/surgical site? --  [None noted]   Does the patient have an appointment scheduled with the cardiologist? Yes   Appointment comments Appt with Grecia Subramanian on 6/6/22 and Dr. Naqvi on 6/8/22   If the patient is a current smoker, are they able to teach back resources for cessation? Not a smoker   Did the patient feel prepared to go home on the same day as the procedure? Yes   Is the patient satisfied with the same day discharge process? Yes   PCI/Device call completed Yes          SUSU MALDONADO - Registered Nurse

## 2022-05-09 NOTE — ANESTHESIA POSTPROCEDURE EVALUATION
Patient: Arash Simmons    Procedure Summary     Date: 05/05/22 Room / Location: [facility] / [facility]    Anesthesia Start: 0801 Anesthesia Stop: 1144    Procedure: Ablation atrial fibrillation, Rhythmia. Stop Sotalol 3 days prior to procedure (N/A ) Diagnosis:       Paroxysmal atrial fibrillation (HCC)      (Afib)    Providers: Sal Verdugo DO Provider: Charles Freitas MD    Anesthesia Type: general ASA Status: 3          Anesthesia Type: general    Vitals  Vitals Value Taken Time   /69 05/05/22 1245   Temp 97.7 °F (36.5 °C) 05/05/22 1245   Pulse 78 05/05/22 1250   Resp 14 05/05/22 1230   SpO2 94 % 05/05/22 1250           Post Anesthesia Care and Evaluation      Comments: Post Hoc : see VS from PACU record

## 2022-05-11 DIAGNOSIS — F41.9 ANXIETY: ICD-10-CM

## 2022-05-11 RX ORDER — FLUOXETINE 20 MG/1
TABLET, FILM COATED ORAL
Qty: 90 TABLET | Refills: 0 | OUTPATIENT
Start: 2022-05-11

## 2022-05-26 LAB
QT INTERVAL: 438 MS
QTC INTERVAL: 499 MS

## 2022-06-08 ENCOUNTER — OFFICE VISIT (OUTPATIENT)
Dept: CARDIOLOGY | Facility: CLINIC | Age: 62
End: 2022-06-08

## 2022-06-08 VITALS
DIASTOLIC BLOOD PRESSURE: 62 MMHG | SYSTOLIC BLOOD PRESSURE: 118 MMHG | OXYGEN SATURATION: 98 % | WEIGHT: 212 LBS | BODY MASS INDEX: 30.35 KG/M2 | HEIGHT: 70 IN | HEART RATE: 70 BPM

## 2022-06-08 DIAGNOSIS — I42.8 NICM (NONISCHEMIC CARDIOMYOPATHY): ICD-10-CM

## 2022-06-08 DIAGNOSIS — I48.0 PAF (PAROXYSMAL ATRIAL FIBRILLATION): Primary | ICD-10-CM

## 2022-06-08 DIAGNOSIS — E78.00 PURE HYPERCHOLESTEROLEMIA: ICD-10-CM

## 2022-06-08 DIAGNOSIS — I10 ESSENTIAL HYPERTENSION: ICD-10-CM

## 2022-06-08 DIAGNOSIS — I44.7 LBBB (LEFT BUNDLE BRANCH BLOCK): ICD-10-CM

## 2022-06-08 PROCEDURE — 99214 OFFICE O/P EST MOD 30 MIN: CPT | Performed by: INTERNAL MEDICINE

## 2022-06-08 RX ORDER — SOTALOL HYDROCHLORIDE 80 MG/1
80 TABLET ORAL 2 TIMES DAILY
Qty: 60 TABLET | Refills: 5 | Status: SHIPPED | OUTPATIENT
Start: 2022-06-08 | End: 2022-06-28 | Stop reason: DRUGHIGH

## 2022-06-08 RX ORDER — FUROSEMIDE 40 MG/1
TABLET ORAL
Qty: 30 TABLET | Refills: 2 | Status: SHIPPED | OUTPATIENT
Start: 2022-06-08 | End: 2022-09-26

## 2022-06-08 RX ORDER — SOTALOL HYDROCHLORIDE 80 MG/1
160 TABLET ORAL 2 TIMES DAILY
COMMUNITY
End: 2022-06-08 | Stop reason: DRUGHIGH

## 2022-06-08 NOTE — PROGRESS NOTES
Subjective:     Encounter Date:06/08/2022    Primary Care Physician: Sundeep Stone MD      Patient ID: Arash Simmons is a 62 y.o. male.    Chief Complaint:Follow-up      PROBLEM LIST:  1. Nonischemic cardiomyopathy:  a.  On 04/07/2015, abnormal myocardial perfusion study with evidence of dilated cardiomyopathy, ejection fraction of 16%, large fixed perfusion defect in the anterior apex, consistent with prior myocardial infarction.   b. On 05/08/2015, cardiac catheterization  with EF of 10% to 15%.  Normal coronary arteries.  Severe left ventricular dilatation.    c. Echo, 07/08/2015, LVEF 20%.   d. Status post biventricular ICD placement, August 2015.  e. 9/2020 echo EF 25-30% mild AR. Mild to mod MR. Read by Dr. Orozco.  2. Biventricular ICD, [employer], 8/2015  a. ICD shock 7/3/2020 due to SVT/atrial tachycardia at 220 bpm  b. ICD shock 8/21 due to atrial tachycardia with one-to-one conduction.  (Not A. fib)  3. Atrial tahcycardia/fib  1. PVA Dr. Verdugo 5/5/2022.  4. Left bundle branch block.   5. Hypertension.   6. Dyslipidemia.   7. Anxiety.   8. Cervical spine  9. Questionable sleep apnea.   10. Left knee replacement 2017  11. Abdominal/intestinal surgery as a child.  12. Elbow surgery  13. Tonsillectomy and adenoidectomy  14. Left knee replacement      No Known Allergies      Current Outpatient Medications:   •  apixaban (ELIQUIS) 5 MG tablet tablet, Take 1 tablet by mouth Every 12 (Twelve) Hours., Disp: 365 tablet, Rfl: 3  •  atorvastatin (LIPITOR) 10 MG tablet, Take 1 tablet by mouth Every Night., Disp: 90 tablet, Rfl: 1  •  FLUoxetine (PROzac) 20 MG tablet, Take 2 tablets by mouth Daily., Disp: 180 tablet, Rfl: 1  •  furosemide (LASIX) 40 MG tablet, Take 1 tablet by mouth once daily, Disp: 30 tablet, Rfl: 2  •  metoprolol succinate XL (TOPROL-XL) 50 MG 24 hr tablet, Take 1 tablet by mouth Daily., Disp: 90 tablet, Rfl: 3  •  Multiple Vitamins-Iron (MULTI-VITAMIN/IRON PO), Take 1 tablet by mouth  "Daily., Disp: , Rfl:   •  pantoprazole (Protonix) 40 MG EC tablet, Take 1 tablet by mouth Daily., Disp: 90 tablet, Rfl: 0  •  sacubitril-valsartan (Entresto) 24-26 MG tablet, Take 1 tablet by mouth Every 12 (Twelve) Hours., Disp: 180 tablet, Rfl: 1  •  sotalol (BETAPACE) 80 MG tablet, Take 160 mg by mouth 2 (Two) Times a Day., Disp: , Rfl:   •  spironolactone (ALDACTONE) 25 MG tablet, Take 1 tablet by mouth once daily, Disp: 90 tablet, Rfl: 0        History of Present Illness    Patient returns today for follow-up of nonischemic cardiomyopathy, status post recent PVA due to atrial tachycardia inappropriate ICD shocks due to this/A. fib.  Currently, he has had no further arrhythmias or device shocks.  He has returned to work.  He has note he is excessively tired and has no energy.  He notes this started at the time of his PVA, when his sotalol dose was doubled and his beta-blocker was continued.  He has no bradycardia presyncope or syncope.  No chest pain orthopnea    The following portions of the patient's history were reviewed and updated as appropriate: allergies, current medications, past family history, past medical history, past social history, past surgical history and problem list.      Social History     Tobacco Use   • Smoking status: Former Smoker     Packs/day: 2.00     Years: 30.00     Pack years: 60.00     Quit date: 1999     Years since quittin.8   • Smokeless tobacco: Never Used   Vaping Use   • Vaping Use: Never used   Substance Use Topics   • Alcohol use: Yes     Comment: 2 beers per day   • Drug use: No         ROS       Objective:   /62   Pulse 70   Ht 177.8 cm (70\")   Wt 96.2 kg (212 lb)   SpO2 98%   BMI 30.42 kg/m²         Vitals reviewed.   Constitutional:       Appearance: Well-developed and not in distress.   Neck:      Thyroid: No thyromegaly.      Vascular: No carotid bruit or JVD.   Pulmonary:      Breath sounds: Examination of the left-lower field reveals decreased " breath sounds. Decreased breath sounds present.   Cardiovascular:      Regular rhythm.      No gallop. No S3 and S4 gallop.   Abdominal:      General: Bowel sounds are normal.      Palpations: Abdomen is soft. There is no abdominal mass.      Tenderness: There is no abdominal tenderness.   Musculoskeletal:         General: No deformity.      Extremities: No clubbing present.Skin:     General: Skin is warm and dry.      Findings: No rash.   Neurological:      Mental Status: Alert and oriented to person, place, and time.         Procedures          Assessment:   Assessment & Plan      Diagnoses and all orders for this visit:    1. PAF (paroxysmal atrial fibrillation) (HCC) (Primary)    2. NICM (nonischemic cardiomyopathy) ([facility])    3. LBBB (left bundle branch block)    4. Essential hypertension    5. Pure hypercholesterolemia      1.  Nonischemic cardiomyopathy.  Currently euvolemic.  Functional class I-2.  On Entresto and metoprolol XL  2.  PAF/SVT status post ablation.  No events.  No further ICD shocks.  3.  Malaise fatigue, undoubtedly due to his very high-dose sotalol in addition to Toprol  4.  AF, on chronic Eliquis is  5.  BiV ICD  6.  Hypertension well-controlled  7.  Dyslipidemia controlled on low intensity statin    Recommendations:  1.  Decrease his sotalol to 80 mg twice daily.  2.  If she is still with persistent malaise fatigue, will consider switching his metoprolol back to Coreg, or consider doing discontinuing sotalol all together (with EP approval).  3.  Revisit 6 months with device check.  Apparent symptom change       Pb Naqvi MD      Dictated utilizing Dragon dictation

## 2022-06-17 ENCOUNTER — TELEPHONE (OUTPATIENT)
Dept: CARDIOLOGY | Facility: CLINIC | Age: 62
End: 2022-06-17

## 2022-06-17 NOTE — TELEPHONE ENCOUNTER
Patient is currently taking Eliquis 5 mg BID. He called to let you know that he was getting out of the shower last night and noticed some blood on his foot. He has a varicose vein that ruptured in his leg. He states that it was bleeding so much that he had to call 911. His wife held pressure for an extra 20-30 minutes and the bleeding resolved. He is very concerned and would like to know if he needs to do anything different at this point?

## 2022-06-17 NOTE — TELEPHONE ENCOUNTER
Patient notified aware.   I agree with the above H&P from ACP/Resident/Intern. 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 Daughter at bedside who confirms that throughout the chemo pt had lower appetite but was able to force self to eat puree foods. After recent quadshot of radiation, he started feeling dizzy, lightheaded, difficulty swallowing with some pain. He has fallen 2x in the last week with head strike, possible loc. +neck/right head pain from the radiation areas. No fevers, n/v/cp/sob/abd pain/new swelling/d/dysuria or night sweats. Has cavitary lesions on chest CT that is known. Has pneumothorax on CT.  Born in [place] but lived in the  [place] years. Had some night sweats the last few nights.  Labs: Reviewed  Imaging: Reviewed  EKG: Reviewed  PHYSICAL EXAM:  GENERAL: NAD, well-developed, well-nourished  CHEST/LUNG: Clear to auscultation bilaterally; No wheezes, rales or rhonchi  HEART: Regular rate and rhythm; No murmurs, rubs, or gallops, (+)S1, S2  ABDOMEN: Soft, Nontender, Nondistended; Normal Bowel sounds   EXTREMITIES:  2+ Peripheral Pulses, No clubbing, cyanosis, or edema    A/P: Patient admitted for pneumothorax now with cavitary lesions now with mild suspicion for TB. These lesions are known however no previous documentation of whether this is tuberculoid or malignancy in nature vs infectious.  -Will need sputum culture and MRSA nares  -O2 for pneumothorax  -Serial CXR. May need pleurodesis if this is 2/2 cavitary lesions  -Reach out to outpatient pulm to verify if ever had TB workup.  -TB precautions/isolation for now

## 2022-06-21 ENCOUNTER — TELEPHONE (OUTPATIENT)
Dept: CARDIOLOGY | Facility: CLINIC | Age: 62
End: 2022-06-21

## 2022-06-21 NOTE — TELEPHONE ENCOUNTER
If sotalol 80 twice daily fails to relieve his symptoms, would decrease his beta-blocker dose in half and have him contact Dr. Rm for consideration of discontinuing one of the beta-blockers

## 2022-06-21 NOTE — TELEPHONE ENCOUNTER
Patient called to report that since he has changed his medications, he is still having trouble, feels like heart is fluctuating, and he's going to pass out.  Going to start taking one sotalol in morning and one at night as discussed, instead of one in am and 2 in PM.  He would like to know if should do anything else.

## 2022-06-22 NOTE — TELEPHONE ENCOUNTER
Spoke with patient, advised him of Dr. Naqvi's recommendation.  Understanding and agreement verbalized.

## 2022-06-28 ENCOUNTER — TELEPHONE (OUTPATIENT)
Dept: CARDIOLOGY | Facility: CLINIC | Age: 62
End: 2022-06-28

## 2022-06-28 DIAGNOSIS — I48.19 PERSISTENT ATRIAL FIBRILLATION: Primary | ICD-10-CM

## 2022-06-28 DIAGNOSIS — I48.91 ATRIAL FIBRILLATION, UNSPECIFIED TYPE: ICD-10-CM

## 2022-06-28 NOTE — TELEPHONE ENCOUNTER
"Called to check on Mr Simmons after receiving a remote transmission showing on going  atrial fibrillation since 6/24/2022 with uncontrolled rates. Mr Simmons was instructed by Dr Naqvi to decreased his Sotolol on 6/21/2022 due to feeling fluttering and feeling like he was going to pass out.  He states he can feel his fast rates and it makes him feel \"getery\". He reports having a little swelling in his legs but no SOA. Today his presenting EGM reveals atrial fibrillation with rate of 110-120 bpm.On 6/23/2022 he had a 4 hour episode of atrial fibrillation with RVR with average ventricular rate of 133.Report in murj for review.  "

## 2022-06-29 DIAGNOSIS — I48.91 ATRIAL FIBRILLATION, UNSPECIFIED TYPE: Primary | ICD-10-CM

## 2022-06-29 NOTE — TELEPHONE ENCOUNTER
Sal Verdugo, DO  to Grecia Calvo RN • Farhan Blackwood PA          6/17/22 12:06 PM  He must stay on the anticoagulation until August because of the ablation we just performed.  If he stops it he is at a very high risk of stroke.  Make sure that he understands this.  It would not be worth having a stroke over some bleeding from a varicose vein.  In the meantime perhaps he could take extra care to avoid trauma.  I doubt that this varicose vein spontaneously bled.  He probably bumped into something.     I will discuss options with him at follow-up as scheduled.

## 2022-06-29 NOTE — TELEPHONE ENCOUNTER
Mr Simmons called this morning to let us know that at 4am this morning he was awakened by SOA. He states his belly was swollen he could not walk without getting SOA. However, he states he went on to work and is feeling a little better. He is compliant with all his medications. He takes a Lasix every am.

## 2022-06-29 NOTE — TELEPHONE ENCOUNTER
Called Mr Simmons and gave instructions from Saloni. He verbalized understanding of all new orders.

## 2022-07-01 ENCOUNTER — HOSPITAL ENCOUNTER (OUTPATIENT)
Dept: CARDIOLOGY | Facility: HOSPITAL | Age: 62
Discharge: HOME | End: 2022-07-01
Admitting: INTERNAL MEDICINE

## 2022-07-01 DIAGNOSIS — I48.19 PERSISTENT ATRIAL FIBRILLATION: ICD-10-CM

## 2022-07-01 LAB
QT INTERVAL: 438 MS
QTC INTERVAL: 473 MS

## 2022-07-01 PROCEDURE — 93005 ELECTROCARDIOGRAM TRACING: CPT | Performed by: INTERNAL MEDICINE

## 2022-07-05 ENCOUNTER — HOSPITAL ENCOUNTER (OUTPATIENT)
Dept: CARDIOLOGY | Facility: HOSPITAL | Age: 62
Discharge: HOME | End: 2022-07-05
Attending: INTERNAL MEDICINE | Admitting: INTERNAL MEDICINE

## 2022-07-05 VITALS
TEMPERATURE: 98 F | HEART RATE: 70 BPM | RESPIRATION RATE: 18 BRPM | WEIGHT: 209.4 LBS | OXYGEN SATURATION: 97 % | SYSTOLIC BLOOD PRESSURE: 134 MMHG | BODY MASS INDEX: 29.98 KG/M2 | HEIGHT: 70 IN | DIASTOLIC BLOOD PRESSURE: 85 MMHG

## 2022-07-05 DIAGNOSIS — I48.91 ATRIAL FIBRILLATION, UNSPECIFIED TYPE: ICD-10-CM

## 2022-07-05 PROCEDURE — 92960 CARDIOVERSION ELECTRIC EXT: CPT | Performed by: INTERNAL MEDICINE

## 2022-07-05 PROCEDURE — 36415 COLL VENOUS BLD VENIPUNCTURE: CPT

## 2022-07-05 PROCEDURE — 93010 ELECTROCARDIOGRAM REPORT: CPT | Performed by: INTERNAL MEDICINE

## 2022-07-05 PROCEDURE — 93005 ELECTROCARDIOGRAM TRACING: CPT | Performed by: INTERNAL MEDICINE

## 2022-07-05 PROCEDURE — 92960 CARDIOVERSION ELECTRIC EXT: CPT

## 2022-07-11 LAB
MAXIMAL PREDICTED HEART RATE: 159 BPM
STRESS TARGET HR: 135 BPM

## 2022-07-20 ENCOUNTER — TELEPHONE (OUTPATIENT)
Dept: FAMILY MEDICINE CLINIC | Facility: CLINIC | Age: 62
End: 2022-07-20

## 2022-07-20 NOTE — TELEPHONE ENCOUNTER
Caller: Arash Simmons    Relationship: Self    Best call back number:     What is the best time to reach you: ANYTIME    Who are you requesting to speak with (clinical staff, provider,  specific staff member): CLINICAL STAFF    Do you know the name of the person who called:ARASH    What was the call regarding: PATIENTS SPOUSE MARIANNE LEFT THE OFFICE TODAY 072022 AND HAS TESTED POSITIVE FOR COVD;  HE HAS NO SYMPTOMS BUT WANTED TO KNOW WHAT HE SHOULD BE DOING    Do you require a callback: YES

## 2022-07-20 NOTE — TELEPHONE ENCOUNTER
He needs to quarantine himself and avoid close contact with wife moving forward.  Suspect he will get COVID and call with any issues.  Most recent cases have been bad colds thus far.  Call back with any issues

## 2022-08-08 ENCOUNTER — OFFICE VISIT (OUTPATIENT)
Dept: CARDIOLOGY | Facility: CLINIC | Age: 62
End: 2022-08-08

## 2022-08-08 VITALS
HEART RATE: 70 BPM | HEIGHT: 70 IN | BODY MASS INDEX: 29.92 KG/M2 | SYSTOLIC BLOOD PRESSURE: 112 MMHG | DIASTOLIC BLOOD PRESSURE: 60 MMHG | WEIGHT: 209 LBS | OXYGEN SATURATION: 96 %

## 2022-08-08 DIAGNOSIS — I10 PRIMARY HYPERTENSION: ICD-10-CM

## 2022-08-08 DIAGNOSIS — I50.22 CHRONIC SYSTOLIC CONGESTIVE HEART FAILURE: ICD-10-CM

## 2022-08-08 DIAGNOSIS — Z79.899 LONG TERM CURRENT USE OF ANTIARRHYTHMIC DRUG: ICD-10-CM

## 2022-08-08 DIAGNOSIS — I42.8 NICM (NONISCHEMIC CARDIOMYOPATHY): ICD-10-CM

## 2022-08-08 DIAGNOSIS — I44.7 LBBB (LEFT BUNDLE BRANCH BLOCK): ICD-10-CM

## 2022-08-08 DIAGNOSIS — Z95.810 ICD (IMPLANTABLE CARDIOVERTER-DEFIBRILLATOR), DUAL, IN SITU: ICD-10-CM

## 2022-08-08 DIAGNOSIS — I48.0 PAROXYSMAL ATRIAL FIBRILLATION: Primary | ICD-10-CM

## 2022-08-08 PROCEDURE — 99214 OFFICE O/P EST MOD 30 MIN: CPT | Performed by: INTERNAL MEDICINE

## 2022-08-08 PROCEDURE — 93284 PRGRMG EVAL IMPLANTABLE DFB: CPT | Performed by: INTERNAL MEDICINE

## 2022-08-22 ENCOUNTER — TELEPHONE (OUTPATIENT)
Dept: FAMILY MEDICINE CLINIC | Facility: CLINIC | Age: 62
End: 2022-08-22

## 2022-08-22 ENCOUNTER — TELEPHONE (OUTPATIENT)
Dept: CARDIOLOGY | Facility: CLINIC | Age: 62
End: 2022-08-22

## 2022-08-22 DIAGNOSIS — I83.899 BLEEDING FROM VARICOSE VEIN: Primary | ICD-10-CM

## 2022-08-22 NOTE — TELEPHONE ENCOUNTER
Caller: Arash Simmons    Relationship: Self    Best call back number: [phone]    Who are you requesting to speak with (clinical staff, provider,  specific staff member): CLINICAL    What was the call regarding:  PATIENT SAID HE HAS A VARICOSE VEIN ON HIS RIGHT LEG ABOVE HIS ANKLE THAT HAS BUSTED OPEN   IT HAPPENED ABOUT A MONTH AGO   AND THEN IT HAPPENED AGAIN   LAST NIGHT   HE CALLED HIS CARDIO PHYSICIAN AND THEY TOLD HIM TO CALL DR CONNELL TO SEE IF HE COULD LOOK AT IT TO SEE WHAT HE THINKS   HE HAS THE BLEEDING STOPPED NOW AND IS BACK TO WORK   HE IS ON BLOOD THINNERS   NO APPOINTMENTS WERE AVAIBLE        Do you require a callback:  PLEASE CALL AND ADVISE

## 2022-08-22 NOTE — TELEPHONE ENCOUNTER
Patient called to let us know that the varicose vein in his right leg started bleeding again. He states that his leg is not red, warm to touch or has any red streaking. He does state that it is swollen. I advised him to go to his PCP to be evaluated because he will have to remain on Eliquis due to his increased risk for a stroke. He agreed and verbalized understanding.

## 2022-08-23 NOTE — TELEPHONE ENCOUNTER
I will place referral to vascular specialist.  Pressure bandage recommended.  Ok to f/u with me as needed.  I can usually work him in if needed

## 2022-08-28 LAB
QT INTERVAL: 400 MS
QTC INTERVAL: 444 MS

## 2022-08-29 ENCOUNTER — TELEPHONE (OUTPATIENT)
Dept: FAMILY MEDICINE CLINIC | Facility: CLINIC | Age: 62
End: 2022-08-29

## 2022-08-29 NOTE — TELEPHONE ENCOUNTER
Caller: Arash Simmons    Relationship: Self    Best call back number: [phone]   What is the medical concern/diagnosis: VARICOSE VEINS    What specialty or service is being requested: VASCULAR  What is the provider, practice or medical service name:     What is the office location:   What is the office phone number:     Any additional details: PLEASE CALL WITH HIS APPOINTMENT INFORMATION    PREFERS Friday APPOINTMENTS

## 2022-09-16 ENCOUNTER — OFFICE VISIT (OUTPATIENT)
Dept: FAMILY MEDICINE CLINIC | Facility: CLINIC | Age: 62
End: 2022-09-16

## 2022-09-16 VITALS
SYSTOLIC BLOOD PRESSURE: 128 MMHG | HEART RATE: 72 BPM | HEIGHT: 70 IN | BODY MASS INDEX: 30.21 KG/M2 | DIASTOLIC BLOOD PRESSURE: 68 MMHG | WEIGHT: 211 LBS | RESPIRATION RATE: 18 BRPM | TEMPERATURE: 97.7 F

## 2022-09-16 DIAGNOSIS — I83.899 BLEEDING FROM VARICOSE VEIN: Primary | ICD-10-CM

## 2022-09-16 PROCEDURE — 99213 OFFICE O/P EST LOW 20 MIN: CPT | Performed by: FAMILY MEDICINE

## 2022-09-16 NOTE — PROGRESS NOTES
Subjective   Arash Simmons is a 62 y.o. male.     History of Present Illness     He notes blood vessel in right lower leg has been sore  Then enlarged and blood coming from it, pumping out and hitting the wall  Worse after shower and he has needed a bandage to prevent bleeding  He is on eliquis due to his A-fib    He has an appointment with vascular specialist that was made last month but it is not until 10/29!!!  This is the earliest they have available    The following portions of the patient's history were reviewed and updated as appropriate: allergies, current medications, past family history, past medical history, past social history, past surgical history and problem list.    Review of Systems   Constitutional: Negative.        Objective   Physical Exam  Vitals and nursing note reviewed.   Constitutional:       General: He is not in acute distress.     Appearance: Normal appearance. He is well-developed.   Cardiovascular:      Rate and Rhythm: Normal rate and regular rhythm.      Heart sounds: Normal heart sounds.   Pulmonary:      Effort: Pulmonary effort is normal.      Breath sounds: Normal breath sounds.   Skin:         Neurological:      Mental Status: He is alert and oriented to person, place, and time.   Psychiatric:         Mood and Affect: Mood normal.         Behavior: Behavior normal.         Thought Content: Thought content normal.         Judgment: Judgment normal.         Assessment & Plan   Diagnoses and all orders for this visit:    1. Bleeding from varicose vein (Primary)    varicose vein is worse and having more frequent bleeding at this time  Pressure bandage PRN, supplies given.  Awaiting vascular surgeon  Pt was concerned about infection but no signs of infection noted.  He will monitor for any spreading redness around this area.

## 2022-09-23 ENCOUNTER — TELEPHONE (OUTPATIENT)
Dept: CARDIOLOGY | Facility: CLINIC | Age: 62
End: 2022-09-23

## 2022-09-26 RX ORDER — FUROSEMIDE 40 MG/1
TABLET ORAL
Qty: 90 TABLET | Refills: 0 | Status: SHIPPED | OUTPATIENT
Start: 2022-09-26 | End: 2023-01-03

## 2022-10-03 PROCEDURE — 93295 DEV INTERROG REMOTE 1/2/MLT: CPT | Performed by: INTERNAL MEDICINE

## 2022-10-03 PROCEDURE — 93296 REM INTERROG EVL PM/IDS: CPT | Performed by: INTERNAL MEDICINE

## 2022-10-20 DIAGNOSIS — I50.22 CHRONIC SYSTOLIC CONGESTIVE HEART FAILURE: ICD-10-CM

## 2022-10-21 RX ORDER — SACUBITRIL AND VALSARTAN 24; 26 MG/1; MG/1
TABLET, FILM COATED ORAL
Qty: 180 TABLET | Refills: 0 | Status: SHIPPED | OUTPATIENT
Start: 2022-10-21 | End: 2022-11-08 | Stop reason: SDUPTHER

## 2022-11-08 ENCOUNTER — OFFICE VISIT (OUTPATIENT)
Dept: FAMILY MEDICINE CLINIC | Facility: CLINIC | Age: 62
End: 2022-11-08

## 2022-11-08 VITALS
BODY MASS INDEX: 29.63 KG/M2 | DIASTOLIC BLOOD PRESSURE: 78 MMHG | HEIGHT: 70 IN | HEART RATE: 76 BPM | RESPIRATION RATE: 18 BRPM | WEIGHT: 207 LBS | TEMPERATURE: 97.3 F | SYSTOLIC BLOOD PRESSURE: 114 MMHG

## 2022-11-08 DIAGNOSIS — F41.9 ANXIETY: ICD-10-CM

## 2022-11-08 DIAGNOSIS — I50.22 CHRONIC SYSTOLIC CONGESTIVE HEART FAILURE: ICD-10-CM

## 2022-11-08 DIAGNOSIS — I10 ESSENTIAL HYPERTENSION: ICD-10-CM

## 2022-11-08 DIAGNOSIS — E78.00 PURE HYPERCHOLESTEROLEMIA: ICD-10-CM

## 2022-11-08 PROCEDURE — 99214 OFFICE O/P EST MOD 30 MIN: CPT | Performed by: FAMILY MEDICINE

## 2022-11-08 PROCEDURE — 90686 IIV4 VACC NO PRSV 0.5 ML IM: CPT | Performed by: FAMILY MEDICINE

## 2022-11-08 PROCEDURE — 90471 IMMUNIZATION ADMIN: CPT | Performed by: FAMILY MEDICINE

## 2022-11-08 RX ORDER — ATORVASTATIN CALCIUM 10 MG/1
10 TABLET, FILM COATED ORAL NIGHTLY
Qty: 90 TABLET | Refills: 1 | Status: SHIPPED | OUTPATIENT
Start: 2022-11-08

## 2022-11-08 RX ORDER — SPIRONOLACTONE 25 MG/1
25 TABLET ORAL DAILY
Qty: 90 TABLET | Refills: 1 | Status: SHIPPED | OUTPATIENT
Start: 2022-11-08

## 2022-11-08 RX ORDER — SACUBITRIL AND VALSARTAN 24; 26 MG/1; MG/1
1 TABLET, FILM COATED ORAL EVERY 12 HOURS
Qty: 180 TABLET | Refills: 1 | Status: SHIPPED | OUTPATIENT
Start: 2022-11-08

## 2022-11-08 RX ORDER — FLUOXETINE 20 MG/1
40 TABLET, FILM COATED ORAL DAILY
Qty: 180 TABLET | Refills: 1 | Status: SHIPPED | OUTPATIENT
Start: 2022-11-08

## 2022-11-08 NOTE — PROGRESS NOTES
Subjective   Arash Simmons is a 62 y.o. male.     History of Present Illness     CHF has been doing well  No significant SOA and no LE edema noted  Tolerating the entresto  He is wearing compression stockings        Arahs Simmons returns today for follow up of Hyperlipidemia  Arash indicates his exercise level as irregularly.  Diet: trying to eat better  Patient is compliant with medications   Any side effects to medications:   chest pain No myalgia No memory change No  Pt is due for labs    prozac continues to work well for his mood  No SE of the medicine      The following portions of the patient's history were reviewed and updated as appropriate: allergies, current medications, past family history, past medical history, past social history, past surgical history and problem list.    Review of Systems   Constitutional: Negative.    Psychiatric/Behavioral: Negative.        Objective   Physical Exam  Vitals and nursing note reviewed.   Constitutional:       General: He is not in acute distress.     Appearance: Normal appearance. He is well-developed.   Cardiovascular:      Rate and Rhythm: Normal rate and regular rhythm.      Heart sounds: Normal heart sounds.   Pulmonary:      Effort: Pulmonary effort is normal.      Breath sounds: Normal breath sounds.   Neurological:      Mental Status: He is alert and oriented to person, place, and time.   Psychiatric:         Mood and Affect: Mood normal.         Behavior: Behavior normal.         Thought Content: Thought content normal.         Judgment: Judgment normal.         Assessment & Plan   Diagnoses and all orders for this visit:    1. Chronic systolic congestive heart failure (HCC)  -     sacubitril-valsartan (Entresto) 24-26 MG tablet; Take 1 tablet by mouth Every 12 (Twelve) Hours.  Dispense: 180 tablet; Refill: 1  -     spironolactone (ALDACTONE) 25 MG tablet; Take 1 tablet by mouth Daily.  Dispense: 90 tablet; Refill: 1    2. Pure hypercholesterolemia  -      atorvastatin (LIPITOR) 10 MG tablet; Take 1 tablet by mouth Every Night.  Dispense: 90 tablet; Refill: 1  -     CBC & Differential  -     Comprehensive Metabolic Panel  -     Lipid Panel    3. Essential hypertension  -     spironolactone (ALDACTONE) 25 MG tablet; Take 1 tablet by mouth Daily.  Dispense: 90 tablet; Refill: 1  -     CBC & Differential  -     Comprehensive Metabolic Panel    4. Anxiety  -     FLUoxetine (PROzac) 20 MG tablet; Take 2 tablets by mouth Daily.  Dispense: 180 tablet; Refill: 1    Other orders  -     FluLaval/Fluzone >6 mos (6934-2147)    continue entresto and spironolactone.  Other meds form cardiologist,  CHF has been doing well  Continue lipitor and plan to check lipids when fasting  prozac refilled also, mood doing well

## 2022-11-26 DIAGNOSIS — I50.22 CHRONIC SYSTOLIC CONGESTIVE HEART FAILURE: ICD-10-CM

## 2022-11-26 DIAGNOSIS — I10 ESSENTIAL HYPERTENSION: ICD-10-CM

## 2022-11-28 RX ORDER — SPIRONOLACTONE 25 MG/1
TABLET ORAL
Qty: 90 TABLET | Refills: 0 | OUTPATIENT
Start: 2022-11-28

## 2022-11-29 DIAGNOSIS — I10 ESSENTIAL HYPERTENSION: ICD-10-CM

## 2022-11-29 DIAGNOSIS — I50.22 CHRONIC SYSTOLIC CONGESTIVE HEART FAILURE: ICD-10-CM

## 2022-11-29 RX ORDER — SPIRONOLACTONE 25 MG/1
TABLET ORAL
Qty: 90 TABLET | Refills: 0 | OUTPATIENT
Start: 2022-11-29

## 2022-12-02 ENCOUNTER — TELEPHONE (OUTPATIENT)
Dept: CARDIOLOGY | Facility: CLINIC | Age: 62
End: 2022-12-02

## 2022-12-02 NOTE — TELEPHONE ENCOUNTER
" called today asking to send a manual transmission. He states on Wednesday while at work he became dizzy and light headed and felt like his chest was \"falling in\". He decided to go home and once he walked to his car he felt so bad he called 911. While on the phone with dispatch he passed out. The paramedics put him in the ambulance and stated \"his pacemaker had kicked in and slowed his heart rate down\". He states he was monitored for a bit and then was allowed to go home.Report in murj for your review.  "

## 2022-12-06 ENCOUNTER — OFFICE VISIT (OUTPATIENT)
Dept: CARDIOLOGY | Facility: CLINIC | Age: 62
End: 2022-12-06

## 2022-12-06 VITALS
WEIGHT: 208 LBS | DIASTOLIC BLOOD PRESSURE: 62 MMHG | HEIGHT: 70 IN | HEART RATE: 72 BPM | OXYGEN SATURATION: 96 % | BODY MASS INDEX: 29.78 KG/M2 | SYSTOLIC BLOOD PRESSURE: 110 MMHG

## 2022-12-06 DIAGNOSIS — E78.00 PURE HYPERCHOLESTEROLEMIA: Primary | ICD-10-CM

## 2022-12-06 DIAGNOSIS — I42.8 NICM (NONISCHEMIC CARDIOMYOPATHY): ICD-10-CM

## 2022-12-06 DIAGNOSIS — I47.1 PAROXYSMAL SVT (SUPRAVENTRICULAR TACHYCARDIA): ICD-10-CM

## 2022-12-06 DIAGNOSIS — I10 PRIMARY HYPERTENSION: ICD-10-CM

## 2022-12-06 PROCEDURE — 93284 PRGRMG EVAL IMPLANTABLE DFB: CPT | Performed by: INTERNAL MEDICINE

## 2022-12-06 PROCEDURE — 99214 OFFICE O/P EST MOD 30 MIN: CPT | Performed by: INTERNAL MEDICINE
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
## 2022-12-09 LAB
ALBUMIN SERPL-MCNC: 4.2 G/DL (ref 3.5–5.2)
ALBUMIN/GLOB SERPL: 1.5 G/DL
ALP SERPL-CCNC: 108 U/L (ref 39–117)
ALT SERPL-CCNC: 16 U/L (ref 1–41)
AST SERPL-CCNC: 15 U/L (ref 1–40)
BASOPHILS # BLD AUTO: 0.06 10*3/MM3 (ref 0–0.2)
BASOPHILS NFR BLD AUTO: 0.9 % (ref 0–1.5)
BILIRUB SERPL-MCNC: 0.6 MG/DL (ref 0–1.2)
BUN SERPL-MCNC: 17 MG/DL (ref 8–23)
BUN/CREAT SERPL: 19.8 (ref 7–25)
CALCIUM SERPL-MCNC: 9.3 MG/DL (ref 8.6–10.5)
CHLORIDE SERPL-SCNC: 107 MMOL/L (ref 98–107)
CHOLEST SERPL-MCNC: 145 MG/DL (ref 0–200)
CO2 SERPL-SCNC: 28 MMOL/L (ref 22–29)
CREAT SERPL-MCNC: 0.86 MG/DL (ref 0.76–1.27)
EGFRCR SERPLBLD CKD-EPI 2021: 98.5 ML/MIN/1.73
EOSINOPHIL # BLD AUTO: 0.26 10*3/MM3 (ref 0–0.4)
EOSINOPHIL NFR BLD AUTO: 4 % (ref 0.3–6.2)
ERYTHROCYTE [DISTWIDTH] IN BLOOD BY AUTOMATED COUNT: 12.3 % (ref 12.3–15.4)
GLOBULIN SER CALC-MCNC: 2.8 GM/DL
GLUCOSE SERPL-MCNC: 89 MG/DL (ref 65–99)
HCT VFR BLD AUTO: 41.4 % (ref 37.5–51)
HDLC SERPL-MCNC: 39 MG/DL (ref 40–60)
HGB BLD-MCNC: 13 G/DL (ref 13–17.7)
IMM GRANULOCYTES # BLD AUTO: 0.02 10*3/MM3 (ref 0–0.05)
IMM GRANULOCYTES NFR BLD AUTO: 0.3 % (ref 0–0.5)
LDLC SERPL CALC-MCNC: 86 MG/DL (ref 0–100)
LYMPHOCYTES # BLD AUTO: 1.78 10*3/MM3 (ref 0.7–3.1)
LYMPHOCYTES NFR BLD AUTO: 27.5 % (ref 19.6–45.3)
MCH RBC QN AUTO: 29.9 PG (ref 26.6–33)
MCHC RBC AUTO-ENTMCNC: 31.4 G/DL (ref 31.5–35.7)
MCV RBC AUTO: 95.2 FL (ref 79–97)
MONOCYTES # BLD AUTO: 0.78 10*3/MM3 (ref 0.1–0.9)
MONOCYTES NFR BLD AUTO: 12 % (ref 5–12)
NEUTROPHILS # BLD AUTO: 3.58 10*3/MM3 (ref 1.7–7)
NEUTROPHILS NFR BLD AUTO: 55.3 % (ref 42.7–76)
NRBC BLD AUTO-RTO: 0 /100 WBC (ref 0–0.2)
PLATELET # BLD AUTO: 262 10*3/MM3 (ref 140–450)
POTASSIUM SERPL-SCNC: 4.6 MMOL/L (ref 3.5–5.2)
PROT SERPL-MCNC: 7 G/DL (ref 6–8.5)
RBC # BLD AUTO: 4.35 10*6/MM3 (ref 4.14–5.8)
SODIUM SERPL-SCNC: 144 MMOL/L (ref 136–145)
TRIGL SERPL-MCNC: 110 MG/DL (ref 0–150)
VLDLC SERPL CALC-MCNC: 20 MG/DL (ref 5–40)
WBC # BLD AUTO: 6.48 10*3/MM3 (ref 3.4–10.8)

## 2022-12-19 ENCOUNTER — TELEPHONE (OUTPATIENT)
Dept: FAMILY MEDICINE CLINIC | Facility: CLINIC | Age: 62
End: 2022-12-19

## 2022-12-19 NOTE — TELEPHONE ENCOUNTER
Ok to use mucinex, would need to be seen for evaluation.  If not here then [facility] but needs exam for diagnosis and possible testing

## 2022-12-19 NOTE — TELEPHONE ENCOUNTER
Caller: Arash Simmons    Relationship: Self    Best call back number:     [phone]     What medication are you requesting:     PATIENT REQUESTED A CALL BACK WITH CONFIRMATION IF HE MAY USE MEDICATION, MUCINEX FOR SYMPTOMS SINCE HE HAS HEART ISSUES    PATIENT ALSO REQUESTED AN ANTIBIOTIC IF THAT WOULD WORK BETTER FOR SYMPTOMS    What are your current symptoms:     HEAD/CHEST CONGESTION    How long have you been experiencing symptoms:     APPROXIMATELY (3) DAYS    Have you had these symptoms before:    [x] Yes  [] No    Have you been treated for these symptoms before:   [x] Yes  [] No    If a prescription is needed, what is your preferred pharmacy and phone number:     [facility] [place], [place]    TELEPHONE CONTACT:    [phone]

## 2022-12-20 NOTE — TELEPHONE ENCOUNTER
Pt informed and understood, needs to be seen before dx. or medication to treat. Mucinex is ok to use.

## 2023-01-02 PROCEDURE — 93296 REM INTERROG EVL PM/IDS: CPT | Performed by: INTERNAL MEDICINE

## 2023-01-02 PROCEDURE — 93295 DEV INTERROG REMOTE 1/2/MLT: CPT | Performed by: INTERNAL MEDICINE

## 2023-01-03 RX ORDER — FUROSEMIDE 40 MG/1
TABLET ORAL
Qty: 90 TABLET | Refills: 0 | Status: SHIPPED | OUTPATIENT
Start: 2023-01-03 | End: 2023-04-04

## 2023-01-09 ENCOUNTER — OFFICE VISIT (OUTPATIENT)
Dept: CARDIOLOGY | Facility: CLINIC | Age: 63
End: 2023-01-09
Payer: COMMERCIAL

## 2023-01-09 VITALS
OXYGEN SATURATION: 96 % | HEIGHT: 70 IN | BODY MASS INDEX: 29.66 KG/M2 | HEART RATE: 70 BPM | SYSTOLIC BLOOD PRESSURE: 110 MMHG | WEIGHT: 207.2 LBS | DIASTOLIC BLOOD PRESSURE: 80 MMHG

## 2023-01-09 DIAGNOSIS — I48.0 PAROXYSMAL ATRIAL FIBRILLATION: Primary | ICD-10-CM

## 2023-01-09 DIAGNOSIS — Z95.810 ICD (IMPLANTABLE CARDIOVERTER-DEFIBRILLATOR), DUAL, IN SITU: ICD-10-CM

## 2023-01-09 DIAGNOSIS — I42.8 NICM (NONISCHEMIC CARDIOMYOPATHY): Primary | ICD-10-CM

## 2023-01-09 DIAGNOSIS — Z79.899 LONG TERM CURRENT USE OF ANTIARRHYTHMIC DRUG: ICD-10-CM

## 2023-01-09 DIAGNOSIS — I47.1 PAROXYSMAL SVT (SUPRAVENTRICULAR TACHYCARDIA): ICD-10-CM

## 2023-01-09 PROCEDURE — 93284 PRGRMG EVAL IMPLANTABLE DFB: CPT

## 2023-01-09 PROCEDURE — 99214 OFFICE O/P EST MOD 30 MIN: CPT

## 2023-01-09 NOTE — PROGRESS NOTES
Cardiac Electrophysiology Outpatient Follow Up Note            [facility]    Follow Up Office Visit      Arash Simmons  [id number]  01/09[birth year]2023    Primary Care Physician: Sundeep Stone MD    Referred By: No ref. provider found    Subjective     Chief Complaint:   Diagnoses and all orders for this visit:    1. NICM (nonischemic cardiomyopathy) ([facility]) (Primary)    2. ICD (implantable cardioverter-defibrillator), dual, in situ    3. Paroxysmal SVT (supraventricular tachycardia) ([facility])    4. Long term current use of antiarrhythmic drug      Chief Complaint   Patient presents with   • Paroxysmal atrial fibrillation      Problem List:  1. Nonischemic cardiomyopathy:  a.  On 04/07/2015, abnormal myocardial perfusion study with evidence of dilated cardiomyopathy, ejection fraction of 16%, large fixed perfusion defect in the anterior apex, consistent with prior myocardial infarction.   b. On 05/08/2015, cardiac catheterization  with EF of 10% to 15%.  Normal coronary arteries.  Severe left ventricular dilatation.    c. Echo, 07/08/2015, LVEF 20%.   d. Status post biventricular ICD placement, August 2015.  e. 9/2020 echo EF 25-30% mild AR. Mild to mod MR. Read by Dr. Orozco.  2. Biventricular ICD, [employer], 8/2015  a. ICD shock 7/3/2020 due to SVT/atrial tachycardia at 220 bpm  b. ICD shock 8/21 due to atrial tachycardia with one-to-one conduction.  (Not A. fib)  3. Atrial fibrillation  1. PVA Dr. Verdugo 5/5/2022.  2. Recurrent syncope due to atrial tachycardia with one-to-one conduction 11/22  4. Left bundle branch block.   5. Hypertension.   6. Dyslipidemia.   7. Anxiety.   8. Cervical spine  9. Questionable sleep apnea.   10. Left knee replacement 2017  11. Abdominal/intestinal surgery as a child.  12. Elbow surgery  13. Tonsillectomy and adenoidectomy  14. Left knee replacement    History of Present Illness:   Arash Simmons is a 62 y.o. male who  presents to my electrophysiology clinic for follow up of atrial fibrillation and atrial tachycardia. Since last visit, he was seen by Dr. Naqvi December 2022 and was found with multiple episodes of atrial tachycardia. He notes he has symptoms (dizziness, pre-syncope) every time he is in atrial fibrillation. Over the last month, his episodes have lasted a few seconds to a few minutes. However, last interrogation did reveal an episode lasting 30 minutes to an hour. He wishes to proceed with procedure instead of switching to another antiarrhythmic. He denies chest pain, shortness of breath, orthopnea, and edema.     Review of Systems:   14 point ROS negative except for that listed in the HPI.     Past Medical History:   Past Medical History:   Diagnosis Date   • Anxiety    • Arthritis    • Atrial fibrillation (HCC)    • CHF (congestive heart failure) (HCC)    • Dyspnea on exertion    • Hyperlipidemia    • Hypertension    • LBBB (left bundle branch block)    • Lower back pain    • Shortness of breath        Past Surgical History:   Past Surgical History:   Procedure Laterality Date   • ADENOIDECTOMY     • ANTERIOR CERVICAL DISCECTOMY W/ FUSION N/A 09/19/2018    Procedure: ANTERIOR CERVICAL DECOMPRESSION WITH FUSION C4-5 RIGHT;  Surgeon: Israel Garcia MD;  Location: [facility] OR;  Service: Orthopedic Spine   • CARDIAC CATHETERIZATION  2017    X2   • CARDIAC DEFIBRILLATOR PLACEMENT      [employer] biventricular ICD August 2015   • CARDIAC ELECTROPHYSIOLOGY PROCEDURE N/A 5/5/2022    Procedure: Ablation atrial fibrillation, Rhythmia. Stop Sotalol 3 days prior to procedure;  Surgeon: Sal Verdugo DO;  Location: [facility] EP INVASIVE LOCATION;  Service: Cardiovascular;  Laterality: N/A;   • COLONOSCOPY     • ELBOW PROCEDURE Right 1973   • PACEMAKER IMPLANTATION  2015    ICD, PLACED PER MANUEL AND NOW F/U WITH [facility]ANT[facility]    • TONSILLECTOMY     • TOTAL KNEE ARTHROPLASTY Left 10/2017       Family History:   Family History   Problem  Relation Age of Onset   • COPD Mother    • Emphysema Mother    • Diabetes Mother    • COPD Father    • Other Father         black lung        Social History:   Social History     Socioeconomic History   • Marital status:    Tobacco Use   • Smoking status: Former     Packs/day: 2.00     Years: 30.00     Pack years: 60.00     Types: Cigarettes     Quit date: 1999     Years since quittin.4   • Smokeless tobacco: Never   Vaping Use   • Vaping Use: Never used   Substance and Sexual Activity   • Alcohol use: Yes     Comment: 2 beers per day   • Drug use: No   • Sexual activity: Defer     Partners: Female     Comment:        Medications:     Current Outpatient Medications:   •  apixaban (ELIQUIS) 5 MG tablet tablet, Take 1 tablet by mouth Every 12 (Twelve) Hours., Disp: 365 tablet, Rfl: 3  •  atorvastatin (LIPITOR) 10 MG tablet, Take 1 tablet by mouth Every Night., Disp: 90 tablet, Rfl: 1  •  FLUoxetine (PROzac) 20 MG tablet, Take 2 tablets by mouth Daily., Disp: 180 tablet, Rfl: 1  •  furosemide (LASIX) 40 MG tablet, Take 1 tablet by mouth once daily, Disp: 90 tablet, Rfl: 0  •  metoprolol succinate XL (TOPROL-XL) 50 MG 24 hr tablet, Take 1 tablet by mouth Daily., Disp: 90 tablet, Rfl: 3  •  Multiple Vitamins-Iron (MULTI-VITAMIN/IRON PO), Take 1 tablet by mouth Daily., Disp: , Rfl:   •  sacubitril-valsartan (Entresto) 24-26 MG tablet, Take 1 tablet by mouth Every 12 (Twelve) Hours., Disp: 180 tablet, Rfl: 1  •  Sotalol HCl AF 80 MG tablet, On tablet in AM and 2 tablet in PM (Patient taking differently: One tablet in AM and 2 tablet in PM), Disp: 180 tablet, Rfl: 3  •  spironolactone (ALDACTONE) 25 MG tablet, Take 1 tablet by mouth Daily., Disp: 90 tablet, Rfl: 1    Allergies:   No Known Allergies    Objective   Vital Signs:   Vitals:    23 1320   BP: 110/80   BP Location: Right arm   Patient Position: Sitting   Pulse: 70   SpO2: 96%   Weight: 94 kg (207 lb 3.2 oz)   Height: 177.8 cm (70\")        PHYSICAL EXAM  General appearance: Awake, alert, cooperative  Head: Normocephalic, without obvious abnormality, atraumatic  Eyes: Conjunctivae/corneas clear, EOMs intact  Neck: no adenopathy, no carotid bruit, no JVD and thyroid: not enlarged  Lungs: clear to auscultation bilaterally and no rhonchi or crackles\", ' symmetric  Heart: regular rate and rhythm, S1, S2 normal, no murmur, click, rub or gallop  Abdomen: Soft, non-tender, bowel sounds normal,  no organomegaly  Extremities: extremities normal, atraumatic, no cyanosis or edema  Skin: Skin color, turgor normal, no rashes or lesions  Neurologic: Grossly normal     Lab Results   Component Value Date    GLUCOSE 89 12/09/2022    CALCIUM 9.3 12/09/2022     12/09/2022    K 4.6 12/09/2022    CO2 28.0 12/09/2022     12/09/2022    BUN 17 12/09/2022    CREATININE 0.86 12/09/2022    EGFRIFAFRI 100 12/22/2021    EGFRIFNONA 83 12/22/2021    BCR 19.8 12/09/2022    ANIONGAP 11.0 05/04/2022     Lab Results   Component Value Date    WBC 6.48 12/09/2022    HGB 13.0 12/09/2022    HCT 41.4 12/09/2022    MCV 95.2 12/09/2022     12/09/2022     Lab Results   Component Value Date    INR 0.99 10/11/2017    INR 1.02 08/20/2015    PROTIME 10.7 08/20/2015     Lab Results   Component Value Date    TSH 2.480 05/04/2022       Cardiac Testing:     I personally viewed and interpreted the patient's EKG/Telemetry/lab data     Procedures    Tobacco Cessation: N/A    Assessment & Plan    Diagnoses and all orders for this visit:    1. NICM (nonischemic cardiomyopathy) (HCC) (Primary)    2. ICD (implantable cardioverter-defibrillator), dual, in situ    3. Paroxysmal SVT (supraventricular tachycardia) (HCC)    4. Long term current use of antiarrhythmic drug         Diagnosis Plan   1. NICM (nonischemic cardiomyopathy) (HCC)  Decreased EF on echo from 9/2020. On maximally tolerated GDMT. ICD in place.    2. ICD (implantable cardioverter-defibrillator), dual, in situ  Device  interrogation today reveals 26%, totaling 8.7 days AT/A. fib since December 6.   3. Paroxysmal SVT (supraventricular tachycardia) (HCC)  Patient with symptomatic atrial tachycardia with multiple pre-syncopal and syncopal episodes. He is currently failing sotalol therapy and does not wish to be on Tikosyn due to side effects and interactions. He is not a good candidate for amiodarone due to young age.    Spoke with Dr. Verdugo who agrees that best intervention and management is with catheter ablation. We discussed with the patient EP study with catheter ablation of atrial tachycardia vs AV node ablation. Patient does not wish to have AV node ablation.     We will proceed with repeat PVA. He will need to hold sotalol 3 days prior to procedure. There is no indication for repeat CT scan. Continue Eliquis 5mg BID.    4. Long term current use of antiarrhythmic drug  Failing sotalol as he has had multiple episodes of symptomatic atrial tachycardia. Most recent device check with 26% atrial fibrillation/atrial tachycardia.      Body mass index is 29.73 kg/m².      Follow Up:   After procedure, sooner as needed.     Thank you for allowing me to participate in the care of your patient. Please to not hesitate to contact me with additional questions or concerns.      Rebecca Bravo PA-C

## 2023-01-17 ENCOUNTER — PREP FOR SURGERY (OUTPATIENT)
Dept: OTHER | Facility: HOSPITAL | Age: 63
End: 2023-01-17
Payer: COMMERCIAL

## 2023-01-17 DIAGNOSIS — I48.0 PAROXYSMAL ATRIAL FIBRILLATION: Primary | ICD-10-CM

## 2023-01-17 RX ORDER — NITROGLYCERIN 0.4 MG/1
0.4 TABLET SUBLINGUAL
Status: CANCELLED | OUTPATIENT
Start: 2023-01-17

## 2023-01-17 RX ORDER — ACETAMINOPHEN 325 MG/1
650 TABLET ORAL EVERY 4 HOURS PRN
Status: CANCELLED | OUTPATIENT
Start: 2023-01-17

## 2023-01-17 RX ORDER — ONDANSETRON 2 MG/ML
4 INJECTION INTRAMUSCULAR; INTRAVENOUS EVERY 6 HOURS PRN
Status: CANCELLED | OUTPATIENT
Start: 2023-01-17

## 2023-01-17 RX ORDER — SODIUM CHLORIDE 9 MG/ML
40 INJECTION, SOLUTION INTRAVENOUS AS NEEDED
Status: CANCELLED | OUTPATIENT
Start: 2023-01-17

## 2023-01-20 ENCOUNTER — HOSPITAL ENCOUNTER (EMERGENCY)
Facility: HOSPITAL | Age: 63
Discharge: HOME | End: 2023-01-20
Attending: EMERGENCY MEDICINE | Admitting: EMERGENCY MEDICINE
Payer: COMMERCIAL

## 2023-01-20 ENCOUNTER — APPOINTMENT (OUTPATIENT)
Dept: GENERAL RADIOLOGY | Facility: HOSPITAL | Age: 63
End: 2023-01-20
Payer: COMMERCIAL

## 2023-01-20 VITALS
HEART RATE: 70 BPM | TEMPERATURE: 97.6 F | HEIGHT: 70 IN | DIASTOLIC BLOOD PRESSURE: 73 MMHG | BODY MASS INDEX: 26.92 KG/M2 | SYSTOLIC BLOOD PRESSURE: 108 MMHG | OXYGEN SATURATION: 95 % | RESPIRATION RATE: 12 BRPM | WEIGHT: 188 LBS

## 2023-01-20 DIAGNOSIS — I48.91 ATRIAL FIBRILLATION WITH RAPID VENTRICULAR RESPONSE: ICD-10-CM

## 2023-01-20 DIAGNOSIS — I95.9 HYPOTENSION, UNSPECIFIED HYPOTENSION TYPE: Primary | ICD-10-CM

## 2023-01-20 LAB
ALBUMIN SERPL-MCNC: 3.6 G/DL (ref 3.5–5.2)
ALBUMIN/GLOB SERPL: 1.5 G/DL
ALP SERPL-CCNC: 88 U/L (ref 39–117)
ALT SERPL W P-5'-P-CCNC: 49 U/L (ref 1–41)
ANION GAP SERPL CALCULATED.3IONS-SCNC: 9 MMOL/L (ref 5–15)
AST SERPL-CCNC: 33 U/L (ref 1–40)
BASOPHILS # BLD AUTO: 0.05 10*3/MM3 (ref 0–0.2)
BASOPHILS NFR BLD AUTO: 0.6 % (ref 0–1.5)
BILIRUB SERPL-MCNC: 0.4 MG/DL (ref 0–1.2)
BUN SERPL-MCNC: 23 MG/DL (ref 8–23)
BUN/CREAT SERPL: 19.8 (ref 7–25)
CALCIUM SPEC-SCNC: 8.3 MG/DL (ref 8.6–10.5)
CHLORIDE SERPL-SCNC: 109 MMOL/L (ref 98–107)
CO2 SERPL-SCNC: 24 MMOL/L (ref 22–29)
CREAT SERPL-MCNC: 1.16 MG/DL (ref 0.76–1.27)
DEPRECATED RDW RBC AUTO: 44.5 FL (ref 37–54)
EGFRCR SERPLBLD CKD-EPI 2021: 71.7 ML/MIN/1.73
EOSINOPHIL # BLD AUTO: 0.16 10*3/MM3 (ref 0–0.4)
EOSINOPHIL NFR BLD AUTO: 2 % (ref 0.3–6.2)
ERYTHROCYTE [DISTWIDTH] IN BLOOD BY AUTOMATED COUNT: 13 % (ref 12.3–15.4)
GLOBULIN UR ELPH-MCNC: 2.4 GM/DL
GLUCOSE SERPL-MCNC: 125 MG/DL (ref 65–99)
HCT VFR BLD AUTO: 40.2 % (ref 37.5–51)
HGB BLD-MCNC: 13 G/DL (ref 13–17.7)
HOLD SPECIMEN: NORMAL
IMM GRANULOCYTES # BLD AUTO: 0.02 10*3/MM3 (ref 0–0.05)
IMM GRANULOCYTES NFR BLD AUTO: 0.2 % (ref 0–0.5)
LIPASE SERPL-CCNC: 28 U/L (ref 13–60)
LYMPHOCYTES # BLD AUTO: 1.32 10*3/MM3 (ref 0.7–3.1)
LYMPHOCYTES NFR BLD AUTO: 16.2 % (ref 19.6–45.3)
MCH RBC QN AUTO: 30.7 PG (ref 26.6–33)
MCHC RBC AUTO-ENTMCNC: 32.3 G/DL (ref 31.5–35.7)
MCV RBC AUTO: 95 FL (ref 79–97)
MONOCYTES # BLD AUTO: 0.63 10*3/MM3 (ref 0.1–0.9)
MONOCYTES NFR BLD AUTO: 7.7 % (ref 5–12)
NEUTROPHILS NFR BLD AUTO: 5.95 10*3/MM3 (ref 1.7–7)
NEUTROPHILS NFR BLD AUTO: 73.3 % (ref 42.7–76)
NRBC BLD AUTO-RTO: 0 /100 WBC (ref 0–0.2)
NT-PROBNP SERPL-MCNC: 1283 PG/ML (ref 0–900)
PLATELET # BLD AUTO: 227 10*3/MM3 (ref 140–450)
PMV BLD AUTO: 10 FL (ref 6–12)
POTASSIUM SERPL-SCNC: 4.3 MMOL/L (ref 3.5–5.2)
PROT SERPL-MCNC: 6 G/DL (ref 6–8.5)
QT INTERVAL: 350 MS
QT INTERVAL: 440 MS
QT INTERVAL: 474 MS
QTC INTERVAL: 495 MS
QTC INTERVAL: 511 MS
QTC INTERVAL: 576 MS
RBC # BLD AUTO: 4.23 10*6/MM3 (ref 4.14–5.8)
SODIUM SERPL-SCNC: 142 MMOL/L (ref 136–145)
TROPONIN T SERPL-MCNC: <0.01 NG/ML (ref 0–0.03)
TROPONIN T SERPL-MCNC: <0.01 NG/ML (ref 0–0.03)
WBC NRBC COR # BLD: 8.13 10*3/MM3 (ref 3.4–10.8)
WHOLE BLOOD HOLD COAG: NORMAL
WHOLE BLOOD HOLD SPECIMEN: NORMAL

## 2023-01-20 PROCEDURE — 36415 COLL VENOUS BLD VENIPUNCTURE: CPT

## 2023-01-20 PROCEDURE — 96374 THER/PROPH/DIAG INJ IV PUSH: CPT

## 2023-01-20 PROCEDURE — 85025 COMPLETE CBC W/AUTO DIFF WBC: CPT | Performed by: EMERGENCY MEDICINE

## 2023-01-20 PROCEDURE — 93005 ELECTROCARDIOGRAM TRACING: CPT

## 2023-01-20 PROCEDURE — 93005 ELECTROCARDIOGRAM TRACING: CPT | Performed by: EMERGENCY MEDICINE

## 2023-01-20 PROCEDURE — 83880 ASSAY OF NATRIURETIC PEPTIDE: CPT | Performed by: EMERGENCY MEDICINE

## 2023-01-20 PROCEDURE — 84484 ASSAY OF TROPONIN QUANT: CPT | Performed by: EMERGENCY MEDICINE

## 2023-01-20 PROCEDURE — 96361 HYDRATE IV INFUSION ADD-ON: CPT

## 2023-01-20 PROCEDURE — 99282 EMERGENCY DEPT VISIT SF MDM: CPT | Performed by: PHYSICIAN ASSISTANT

## 2023-01-20 PROCEDURE — 71045 X-RAY EXAM CHEST 1 VIEW: CPT

## 2023-01-20 PROCEDURE — 83690 ASSAY OF LIPASE: CPT | Performed by: EMERGENCY MEDICINE

## 2023-01-20 PROCEDURE — 80053 COMPREHEN METABOLIC PANEL: CPT | Performed by: EMERGENCY MEDICINE

## 2023-01-20 PROCEDURE — 99285 EMERGENCY DEPT VISIT HI MDM: CPT

## 2023-01-20 RX ORDER — METOPROLOL SUCCINATE 50 MG/1
50 TABLET, EXTENDED RELEASE ORAL ONCE
Status: COMPLETED | OUTPATIENT
Start: 2023-01-20 | End: 2023-01-20

## 2023-01-20 RX ORDER — METOPROLOL SUCCINATE 25 MG/1
25 TABLET, EXTENDED RELEASE ORAL ONCE
Status: COMPLETED | OUTPATIENT
Start: 2023-01-20 | End: 2023-01-20

## 2023-01-20 RX ORDER — ASPIRIN 81 MG/1
324 TABLET, CHEWABLE ORAL ONCE
Status: COMPLETED | OUTPATIENT
Start: 2023-01-20 | End: 2023-01-20

## 2023-01-20 RX ORDER — SODIUM CHLORIDE 0.9 % (FLUSH) 0.9 %
10 SYRINGE (ML) INJECTION AS NEEDED
Status: DISCONTINUED | OUTPATIENT
Start: 2023-01-20 | End: 2023-01-20 | Stop reason: HOSPADM

## 2023-01-20 RX ORDER — METOPROLOL SUCCINATE 50 MG/1
75 TABLET, EXTENDED RELEASE ORAL DAILY
Qty: 90 TABLET | Refills: 3 | Status: SHIPPED | OUTPATIENT
Start: 2023-01-20 | End: 2023-02-14 | Stop reason: SDUPTHER

## 2023-01-20 RX ORDER — SOTALOL HYDROCHLORIDE 80 MG/1
80 TABLET ORAL ONCE
Status: COMPLETED | OUTPATIENT
Start: 2023-01-20 | End: 2023-01-20

## 2023-01-20 RX ORDER — DILTIAZEM HYDROCHLORIDE 5 MG/ML
10 INJECTION INTRAVENOUS ONCE
Status: COMPLETED | OUTPATIENT
Start: 2023-01-20 | End: 2023-01-20

## 2023-01-20 RX ADMIN — SOTALOL HYDROCHLORIDE 80 MG: 80 TABLET ORAL at 13:27

## 2023-01-20 RX ADMIN — METOPROLOL SUCCINATE 50 MG: 50 TABLET, EXTENDED RELEASE ORAL at 13:26

## 2023-01-20 RX ADMIN — ASPIRIN 81 MG CHEWABLE TABLET 324 MG: 81 TABLET CHEWABLE at 06:55

## 2023-01-20 RX ADMIN — DILTIAZEM HYDROCHLORIDE 10 MG: 5 INJECTION INTRAVENOUS at 13:22

## 2023-01-20 RX ADMIN — SOTALOL HYDROCHLORIDE 80 MG: 80 TABLET ORAL at 14:22

## 2023-01-20 RX ADMIN — METOPROLOL SUCCINATE 25 MG: 25 TABLET, EXTENDED RELEASE ORAL at 14:22

## 2023-01-20 RX ADMIN — SODIUM CHLORIDE 1000 ML: 9 INJECTION, SOLUTION INTRAVENOUS at 08:02

## 2023-01-20 NOTE — CONSULTS
[facility] Electrophyiology        Date of Hospital Visit: 23      Place of Service: [facility]    Patient Name: Arash Simmons  :1960    Referral Provider: ED Physician  Primary Care Provider: Sundeep Stone MD      Chief complaint/Reason for Consultation:  Atrial Fibrillation         Problem List:  Active Hospital Problems    Diagnosis  POA   • Paroxysmal atrial fibrillation (HCC) [I48.0]  Yes     Priority: High     1. PVA Dr. Verdugo 2022.  2. Recurrent syncope due to atrial tachycardia with one-to-one conduction      • Paroxysmal SVT (supraventricular tachycardia) (HCC) [I47.1]  Yes     Priority: High     · Atrial tachycardia     • ICD (implantable cardioverter-defibrillator), dual, in situ [Z95.810]  Yes     Priority: High     a. ICD shock 7/3/2020 due to SVT/atrial tachycardia at 220 bpm  b. ICD shock  due to atrial tachycardia with one-to-one conduction.  (Not A. Fib)  c. AT, Sotalol added.     • NICM (nonischemic cardiomyopathy) (HCC) [I42.8]  Yes     Priority: High     a. On 2015, abnormal myocardial perfusion study with evidence of dilated cardiomyopathy, ejection fraction of 16%, large fixed perfusion defect in the anterior apex, consistent with prior myocardial infarction.   b. On 2015, cardiac catheterization  with EF of 10% to 15%.  Normal coronary arteries.  Severe left ventricular dilatation.    c. Echo, 2015, LVEF 20%.   d. Status post biventricular ICD placement, 2015.  e. 2020 echo EF 25-30% mild AR. Mild to mod MR.      • Long term current use of antiarrhythmic drug [Z79.899]  Not Applicable     Priority: Medium   • Chronic systolic congestive heart failure (HCC) [I50.22]  Yes     Priority: Medium   • LBBB (left bundle branch block) [I44.7]  Yes     Priority: Low   • Hypertension [I10]  Yes     Priority: Low   • Hyperlipidemia [E78.5]  Yes     Priority: Low   • Anxiety [F41.9]  Yes           History of Present  "Illness:  Is a 62-year-old male with nonischemic cardiomyopathy with an ICD, history of paroxysmal atrial fibrillation status post pulmonary vein isolation and ongoing paroxysmal atrial tachycardia.  He was previously on sotalol 80 mg 2 tablets twice daily which was reduced to 1 tablet a.m. and 2 tablets p.m. due to fatigue.  He has had near daily episodes of symptomatic atrial tachycardia.  He was seen in the EP clinic on January 9 and complained of dizziness and near syncope with paroxysmal's of tachycardia lasting up to 30 minutes to an hour.  He was scheduled for EP study with catheter ablation of atrial tachycardia versus AV node ablation which is scheduled for January 26, 2023.  This morning he had a \"spell\" lasting for approximately 15 minutes that was associated with dyspnea and presyncope.  EMS was alerted and he was brought to the [facility] emergency department for further evaluation.  MI has been excluded.  His EKG here shows an AV paced rhythm and his chest x-ray is unremarkable.          Past Surgical History:   Procedure Laterality Date   • ADENOIDECTOMY     • ANTERIOR CERVICAL DISCECTOMY W/ FUSION N/A 09/19/2018    Procedure: ANTERIOR CERVICAL DECOMPRESSION WITH FUSION C4-5 RIGHT;  Surgeon: Israel Garcia MD;  Location: [facility] OR;  Service: Orthopedic Spine   • CARDIAC CATHETERIZATION  2017    X2   • CARDIAC DEFIBRILLATOR PLACEMENT      BSC biventricular ICD August 2015   • CARDIAC ELECTROPHYSIOLOGY PROCEDURE N/A 5/5/2022    Procedure: Ablation atrial fibrillation, Rhythmia. Stop Sotalol 3 days prior to procedure;  Surgeon: Sal Verdugo DO;  Location: [facility] EP INVASIVE LOCATION;  Service: Cardiovascular;  Laterality: N/A;   • COLONOSCOPY     • ELBOW PROCEDURE Right 1973   • PACEMAKER IMPLANTATION  2015    ICD, PLACED PER MANUEL AND NOW F/U WITH CONSTANTINO    • TONSILLECTOMY     • TOTAL KNEE ARTHROPLASTY Left 10/2017       No Known Allergies    Current Outpatient Medications "   Medication Instructions   • apixaban (ELIQUIS) 5 mg, Oral, Every 12 Hours Scheduled   • atorvastatin (LIPITOR) 10 mg, Oral, Nightly   • FLUoxetine (PROZAC) 40 mg, Oral, Daily   • furosemide (LASIX) 40 MG tablet Take 1 tablet by mouth once daily   • metoprolol succinate XL (TOPROL-XL) 50 mg, Oral, Daily   • Multiple Vitamins-Iron (MULTI-VITAMIN/IRON PO) 1 tablet, Oral, Daily   • sacubitril-valsartan (Entresto) 24-26 MG tablet 1 tablet, Oral, Every 12 Hours   • Sotalol HCl AF 80 MG tablet On tablet in AM and 2 tablet in PM   • spironolactone (ALDACTONE) 25 mg, Oral, Daily          Scheduled Meds:  metoprolol succinate XL, 50 mg, Oral, Once  sotalol, 80 mg, Oral, Once      Continuous Infusions:         Social History     Socioeconomic History   • Marital status:    Tobacco Use   • Smoking status: Former     Packs/day: 2.00     Years: 30.00     Pack years: 60.00     Types: Cigarettes     Quit date: 1999     Years since quittin.5   • Smokeless tobacco: Never   Vaping Use   • Vaping Use: Never used   Substance and Sexual Activity   • Alcohol use: Yes     Comment: 2 beers per day   • Drug use: No   • Sexual activity: Defer     Partners: Female     Comment:        Family History   Problem Relation Age of Onset   • COPD Mother    • Emphysema Mother    • Diabetes Mother    • COPD Father    • Other Father         black lung        REVIEW OF SYSTEMS:   Review of Systems   Constitutional: Positive for malaise/fatigue.   HENT: Negative.    Eyes: Negative.    Cardiovascular: Positive for irregular heartbeat.   Respiratory: Positive for shortness of breath.    Endocrine: Negative.    Hematologic/Lymphatic: Negative.    Skin: Negative.    Musculoskeletal: Negative.    Gastrointestinal: Negative.    Genitourinary: Negative.    Neurological: Negative.    Psychiatric/Behavioral: Negative.    Allergic/Immunologic: Negative.    All other systems reviewed and are negative.           Objective:  Vitals:     "01/20/23 0816 01/20/23 0831 01/20/23 0901 01/20/23 0916   BP: 93/62 100/67 103/73 100/67   BP Location:       Patient Position:       Pulse: 74 (!) 137 86 (!) 137   Resp:       Temp:       TempSrc:       SpO2: 96% 97% 98% 100%   Weight:       Height:         Body mass index is 26.98 kg/m².  Flowsheet Rows    Flowsheet Row First Filed Value   Admission Height 177.8 cm (70\") Documented at 01/20/2023 0640   Admission Weight 85.3 kg (188 lb) Documented at 01/20/2023 0640          Intake/Output Summary (Last 24 hours) at 1/20/2023 0932  Last data filed at 1/20/2023 0632  Gross per 24 hour   Intake 550 ml   Output --   Net 550 ml       Vitals and nursing note reviewed.   Constitutional:       Appearance: Healthy appearance. Not in distress.   Eyes:      Conjunctiva/sclera: Conjunctivae normal.      Pupils: Pupils are equal, round, and reactive to light.   Neck:      Vascular: JVD normal.   Pulmonary:      Effort: Pulmonary effort is normal.      Breath sounds: Normal breath sounds.   Chest:      Chest wall: Not tender to palpatation.   Cardiovascular:      Normal rate. Regular rhythm.      Murmurs: There is no murmur.      No gallop. No click. No rub.   Pulses:     Intact distal pulses.   Edema:     Peripheral edema absent.   Abdominal:      General: Bowel sounds are normal.      Palpations: Abdomen is soft.   Musculoskeletal: Normal range of motion.         General: No deformity.      Cervical back: Normal range of motion. Skin:     General: Skin is warm and dry.   Neurological:      General: No focal deficit present.      Mental Status: Alert.               Lab Review:                CBC:      Lab 01/20/23  0646   WBC 8.13   HEMOGLOBIN 13.0   HEMATOCRIT 40.2   PLATELETS 227   NEUTROS ABS 5.95   IMMATURE GRANS (ABS) 0.02   LYMPHS ABS 1.32   MONOS ABS 0.63   EOS ABS 0.16   MCV 95.0     CMP:      Lab 01/20/23  0646   SODIUM 142   POTASSIUM 4.3   CHLORIDE 109*   CO2 24.0   ANION GAP 9.0   BUN 23   CREATININE 1.16   EGFR " 71.7   GLUCOSE 125*   CALCIUM 8.3*   TOTAL PROTEIN 6.0   ALBUMIN 3.6   GLOBULIN 2.4   ALT (SGPT) 49*   AST (SGOT) 33   BILIRUBIN 0.4   ALK PHOS 88   LIPASE 28         Estimated Creatinine Clearance: 80.7 mL/min (by C-G formula based on SCr of 1.16 mg/dL).    CARDIAC LABS:      Lab 01/20/23  0833 01/20/23  0646   PROBNP  --  1,283.0*   TROPONIN T <0.010 <0.010           EKG/ Telemetry:               Result Review:  I have personally reviewed the results from the time of admission and agree with these findings:  [x]  Laboratory  [x]  Radiology  [x]  EKG/Telemetry   []  Pathology  [x]  Old records  []  Other:        Assessment and Plan:     1.  Highly symptomatic paroxysmal atrial tachycardia   -EP study with probable ablation of atrial tachycardia scheduled for 1/26/2023   -Increase sotalol to 80 mg 2 tablets twice daily   -Increase metoprolol succinate to 50 mg 1.5 tablets daily   -He will have to stop his sotalol 3 days prior to ablation.  I have advised taking an extra metoprolol succinate half tablet as needed for tacky arrhythmias.        Electronically signed by LIBBY Grey, 01/20/23, 11:26 AM EST.

## 2023-01-20 NOTE — H&P (VIEW-ONLY)
[facility] Electrophyiology        Date of Hospital Visit: 23      Place of Service: [facility]    Patient Name: Arash Simmons  :1960    Referral Provider: ED Physician  Primary Care Provider: Sundeep Stone MD      Chief complaint/Reason for Consultation:  Atrial Fibrillation         Problem List:  Active Hospital Problems    Diagnosis  POA   • Paroxysmal atrial fibrillation (HCC) [I48.0]  Yes     Priority: High     1. PVA Dr. Verdugo 2022.  2. Recurrent syncope due to atrial tachycardia with one-to-one conduction      • Paroxysmal SVT (supraventricular tachycardia) (HCC) [I47.1]  Yes     Priority: High     · Atrial tachycardia     • ICD (implantable cardioverter-defibrillator), dual, in situ [Z95.810]  Yes     Priority: High     a. ICD shock 7/3/2020 due to SVT/atrial tachycardia at 220 bpm  b. ICD shock  due to atrial tachycardia with one-to-one conduction.  (Not A. Fib)  c. AT, Sotalol added.     • NICM (nonischemic cardiomyopathy) (HCC) [I42.8]  Yes     Priority: High     a. On 2015, abnormal myocardial perfusion study with evidence of dilated cardiomyopathy, ejection fraction of 16%, large fixed perfusion defect in the anterior apex, consistent with prior myocardial infarction.   b. On 2015, cardiac catheterization  with EF of 10% to 15%.  Normal coronary arteries.  Severe left ventricular dilatation.    c. Echo, 2015, LVEF 20%.   d. Status post biventricular ICD placement, 2015.  e. 2020 echo EF 25-30% mild AR. Mild to mod MR.      • Long term current use of antiarrhythmic drug [Z79.899]  Not Applicable     Priority: Medium   • Chronic systolic congestive heart failure (HCC) [I50.22]  Yes     Priority: Medium   • LBBB (left bundle branch block) [I44.7]  Yes     Priority: Low   • Hypertension [I10]  Yes     Priority: Low   • Hyperlipidemia [E78.5]  Yes     Priority: Low   • Anxiety [F41.9]  Yes           History of Present  "Illness:  Is a 62-year-old male with nonischemic cardiomyopathy with an ICD, history of paroxysmal atrial fibrillation status post pulmonary vein isolation and ongoing paroxysmal atrial tachycardia.  He was previously on sotalol 80 mg 2 tablets twice daily which was reduced to 1 tablet a.m. and 2 tablets p.m. due to fatigue.  He has had near daily episodes of symptomatic atrial tachycardia.  He was seen in the EP clinic on January 9 and complained of dizziness and near syncope with paroxysmal's of tachycardia lasting up to 30 minutes to an hour.  He was scheduled for EP study with catheter ablation of atrial tachycardia versus AV node ablation which is scheduled for January 26, 2023.  This morning he had a \"spell\" lasting for approximately 15 minutes that was associated with dyspnea and presyncope.  EMS was alerted and he was brought to the [facility] emergency department for further evaluation.  MI has been excluded.  His EKG here shows an AV paced rhythm and his chest x-ray is unremarkable.          Past Surgical History:   Procedure Laterality Date   • ADENOIDECTOMY     • ANTERIOR CERVICAL DISCECTOMY W/ FUSION N/A 09/19/2018    Procedure: ANTERIOR CERVICAL DECOMPRESSION WITH FUSION C4-5 RIGHT;  Surgeon: Israel Garcia MD;  Location: [facility] OR;  Service: Orthopedic Spine   • CARDIAC CATHETERIZATION  2017    X2   • CARDIAC DEFIBRILLATOR PLACEMENT      BSC biventricular ICD August 2015   • CARDIAC ELECTROPHYSIOLOGY PROCEDURE N/A 5/5/2022    Procedure: Ablation atrial fibrillation, Rhythmia. Stop Sotalol 3 days prior to procedure;  Surgeon: Sal Verdugo DO;  Location: [facility] EP INVASIVE LOCATION;  Service: Cardiovascular;  Laterality: N/A;   • COLONOSCOPY     • ELBOW PROCEDURE Right 1973   • PACEMAKER IMPLANTATION  2015    ICD, PLACED PER MANUEL AND NOW F/U WITH CONSTANTINO    • TONSILLECTOMY     • TOTAL KNEE ARTHROPLASTY Left 10/2017       No Known Allergies    Current Outpatient Medications "   Medication Instructions   • apixaban (ELIQUIS) 5 mg, Oral, Every 12 Hours Scheduled   • atorvastatin (LIPITOR) 10 mg, Oral, Nightly   • FLUoxetine (PROZAC) 40 mg, Oral, Daily   • furosemide (LASIX) 40 MG tablet Take 1 tablet by mouth once daily   • metoprolol succinate XL (TOPROL-XL) 50 mg, Oral, Daily   • Multiple Vitamins-Iron (MULTI-VITAMIN/IRON PO) 1 tablet, Oral, Daily   • sacubitril-valsartan (Entresto) 24-26 MG tablet 1 tablet, Oral, Every 12 Hours   • Sotalol HCl AF 80 MG tablet On tablet in AM and 2 tablet in PM   • spironolactone (ALDACTONE) 25 mg, Oral, Daily          Scheduled Meds:  metoprolol succinate XL, 50 mg, Oral, Once  sotalol, 80 mg, Oral, Once      Continuous Infusions:         Social History     Socioeconomic History   • Marital status:    Tobacco Use   • Smoking status: Former     Packs/day: 2.00     Years: 30.00     Pack years: 60.00     Types: Cigarettes     Quit date: 1999     Years since quittin.5   • Smokeless tobacco: Never   Vaping Use   • Vaping Use: Never used   Substance and Sexual Activity   • Alcohol use: Yes     Comment: 2 beers per day   • Drug use: No   • Sexual activity: Defer     Partners: Female     Comment:        Family History   Problem Relation Age of Onset   • COPD Mother    • Emphysema Mother    • Diabetes Mother    • COPD Father    • Other Father         black lung        REVIEW OF SYSTEMS:   Review of Systems   Constitutional: Positive for malaise/fatigue.   HENT: Negative.    Eyes: Negative.    Cardiovascular: Positive for irregular heartbeat.   Respiratory: Positive for shortness of breath.    Endocrine: Negative.    Hematologic/Lymphatic: Negative.    Skin: Negative.    Musculoskeletal: Negative.    Gastrointestinal: Negative.    Genitourinary: Negative.    Neurological: Negative.    Psychiatric/Behavioral: Negative.    Allergic/Immunologic: Negative.    All other systems reviewed and are negative.           Objective:  Vitals:     "01/20/23 0816 01/20/23 0831 01/20/23 0901 01/20/23 0916   BP: 93/62 100/67 103/73 100/67   BP Location:       Patient Position:       Pulse: 74 (!) 137 86 (!) 137   Resp:       Temp:       TempSrc:       SpO2: 96% 97% 98% 100%   Weight:       Height:         Body mass index is 26.98 kg/m².  Flowsheet Rows    Flowsheet Row First Filed Value   Admission Height 177.8 cm (70\") Documented at 01/20/2023 0640   Admission Weight 85.3 kg (188 lb) Documented at 01/20/2023 0640          Intake/Output Summary (Last 24 hours) at 1/20/2023 0932  Last data filed at 1/20/2023 0632  Gross per 24 hour   Intake 550 ml   Output --   Net 550 ml       Vitals and nursing note reviewed.   Constitutional:       Appearance: Healthy appearance. Not in distress.   Eyes:      Conjunctiva/sclera: Conjunctivae normal.      Pupils: Pupils are equal, round, and reactive to light.   Neck:      Vascular: JVD normal.   Pulmonary:      Effort: Pulmonary effort is normal.      Breath sounds: Normal breath sounds.   Chest:      Chest wall: Not tender to palpatation.   Cardiovascular:      Normal rate. Regular rhythm.      Murmurs: There is no murmur.      No gallop. No click. No rub.   Pulses:     Intact distal pulses.   Edema:     Peripheral edema absent.   Abdominal:      General: Bowel sounds are normal.      Palpations: Abdomen is soft.   Musculoskeletal: Normal range of motion.         General: No deformity.      Cervical back: Normal range of motion. Skin:     General: Skin is warm and dry.   Neurological:      General: No focal deficit present.      Mental Status: Alert.               Lab Review:                CBC:      Lab 01/20/23  0646   WBC 8.13   HEMOGLOBIN 13.0   HEMATOCRIT 40.2   PLATELETS 227   NEUTROS ABS 5.95   IMMATURE GRANS (ABS) 0.02   LYMPHS ABS 1.32   MONOS ABS 0.63   EOS ABS 0.16   MCV 95.0     CMP:      Lab 01/20/23  0646   SODIUM 142   POTASSIUM 4.3   CHLORIDE 109*   CO2 24.0   ANION GAP 9.0   BUN 23   CREATININE 1.16   EGFR " 71.7   GLUCOSE 125*   CALCIUM 8.3*   TOTAL PROTEIN 6.0   ALBUMIN 3.6   GLOBULIN 2.4   ALT (SGPT) 49*   AST (SGOT) 33   BILIRUBIN 0.4   ALK PHOS 88   LIPASE 28         Estimated Creatinine Clearance: 80.7 mL/min (by C-G formula based on SCr of 1.16 mg/dL).    CARDIAC LABS:      Lab 01/20/23  0833 01/20/23  0646   PROBNP  --  1,283.0*   TROPONIN T <0.010 <0.010           EKG/ Telemetry:               Result Review:  I have personally reviewed the results from the time of admission and agree with these findings:  [x]  Laboratory  [x]  Radiology  [x]  EKG/Telemetry   []  Pathology  [x]  Old records  []  Other:        Assessment and Plan:     1.  Highly symptomatic paroxysmal atrial tachycardia   -EP study with probable ablation of atrial tachycardia scheduled for 1/26/2023   -Increase sotalol to 80 mg 2 tablets twice daily   -Increase metoprolol succinate to 50 mg 1.5 tablets daily   -He will have to stop his sotalol 3 days prior to ablation.  I have advised taking an extra metoprolol succinate half tablet as needed for tacky arrhythmias.        Electronically signed by LIBBY Grey, 01/20/23, 11:26 AM EST.

## 2023-01-20 NOTE — ED PROVIDER NOTES
Subjective   History of Present Illness  Mr. Simmons presents with weakness and dizziness.  He tells me for at least a week he has been having weak and dizzy spells.  He saw his cardiologist recently and it was felt to be due to intermittent rapid atrial fibrillation.  He tells me he cannot feel when his heartbeat goes fast but he knows that he is in A. fib when he starts feeling weak and dizzy.  He is scheduled for an ablation next week.  He tells me he got up to go to the bathroom this morning and became very pale and nauseous and felt like he was going to pass out.  He had some pressure in his chest at the same time.  EMS was called and they noted an initial blood pressure of 106.  Later during their assessment however he developed pressures in the 70s.  At some point he was bolused with esmolol and and esmolol drip was started.  He was given a 500 cc saline bolus in route.  He tells me he feels better on arrival.  His heart monitor on arrival is reading heart rates in the 130s and 140s although EKG shows ventricular paced rhythm with a rate of 76.        Review of Systems    Past Medical History:   Diagnosis Date   • Anxiety    • Arthritis    • Atrial fibrillation (HCC)    • CHF (congestive heart failure) (HCC)    • Dyspnea on exertion    • Hyperlipidemia    • Hypertension    • LBBB (left bundle branch block)    • Lower back pain    • Shortness of breath        No Known Allergies    Past Surgical History:   Procedure Laterality Date   • ADENOIDECTOMY     • ANTERIOR CERVICAL DISCECTOMY W/ FUSION N/A 09/19/2018    Procedure: ANTERIOR CERVICAL DECOMPRESSION WITH FUSION C4-5 RIGHT;  Surgeon: Israel Garica MD;  Location: [facility];  Service: Orthopedic Spine   • CARDIAC CATHETERIZATION  2017    X2   • CARDIAC DEFIBRILLATOR PLACEMENT      C biventricular ICD August 2015   • CARDIAC ELECTROPHYSIOLOGY PROCEDURE N/A 5/5/2022    Procedure: Ablation atrial fibrillation, Rhythmia. Stop Sotalol 3 days prior to procedure;   Surgeon: Sal Verdugo DO;  Location: [facility] INVASIVE LOCATION;  Service: Cardiovascular;  Laterality: N/A;   • COLONOSCOPY     • ELBOW PROCEDURE Right 1973   • PACEMAKER IMPLANTATION  2015    ICD, PLACED PER RUKAVINA AND NOW F/U WITH CONSTANTINO    • TONSILLECTOMY     • TOTAL KNEE ARTHROPLASTY Left 10/2017       Family History   Problem Relation Age of Onset   • COPD Mother    • Emphysema Mother    • Diabetes Mother    • COPD Father    • Other Father         black lung        Social History     Socioeconomic History   • Marital status:    Tobacco Use   • Smoking status: Former     Packs/day: 2.00     Years: 30.00     Pack years: 60.00     Types: Cigarettes     Quit date: 1999     Years since quittin.5   • Smokeless tobacco: Never   Vaping Use   • Vaping Use: Never used   Substance and Sexual Activity   • Alcohol use: Yes     Comment: 2 beers per day   • Drug use: No   • Sexual activity: Defer     Partners: Female     Comment:            Objective   Physical Exam  Vitals and nursing note reviewed.   Constitutional:       General: He is not in acute distress.     Appearance: Normal appearance.   HENT:      Head: Normocephalic and atraumatic.      Nose: Nose normal. No congestion or rhinorrhea.   Eyes:      General: No scleral icterus.     Conjunctiva/sclera: Conjunctivae normal.   Neck:      Comments: No JVD   Cardiovascular:      Rate and Rhythm: Normal rate and regular rhythm.      Heart sounds: No murmur heard.    No friction rub.   Pulmonary:      Effort: Pulmonary effort is normal.      Breath sounds: Normal breath sounds. No wheezing or rales.   Abdominal:      General: Bowel sounds are normal.      Palpations: Abdomen is soft.      Tenderness: There is no abdominal tenderness. There is no guarding or rebound.   Musculoskeletal:         General: No tenderness.      Cervical back: Normal range of motion and neck supple.      Right lower leg: No edema.      Left lower leg: No edema.    Skin:     General: Skin is warm and dry.      Coloration: Skin is not pale.      Findings: No erythema.   Neurological:      General: No focal deficit present.      Mental Status: He is alert and oriented to person, place, and time.      Motor: No weakness.      Coordination: Coordination normal.   Psychiatric:         Mood and Affect: Mood normal.         Behavior: Behavior normal.         Thought Content: Thought content normal.         Procedures           ED Course  ED Course as of 01/20/23 1540   Fri Jan 20, 2023   0735 87/56.  Has completed 1 L total of fluids.  Have reviewed his chest x-ray and labs and they are unremarkable.  I have ordered his morning medications.  Will ask cardiology to see him. [DT]   0756 88/59.  Heart rate in the 70s although the monitor is reading 1 35-1 40. Will order 2nd liter of fluids.  Have held his morning beta-blockers due to hypotension. [DT]   0815 Spoke with Bella in the cardiology clinic and requested a consult. [DT]   0817 Mr. Simmons is resting comfortably.  I updated him on plan for second troponin and cardiology consultation.  He tells me he feels much better.  Blood pressure is now in the 90s [DT]   1031 109/72.  Second troponin is nondetectable.  Heart rate around 75.  The monitor reads twice that but it is double counting due to the presence of his pacemaker. [DT]   1212 Mr. Simmons has been seen by cardiology who is familiar with him.  They have made medication changes and written orders for discharge. [DT]   1259 He had wanted to wait till he got home to take his medicines as he tells us they make him feel funny.  Prior to discharge and after we removed his IV he began feeling weak and dizzy again.  We repeated his EKG which shows a heart rate of 163 with rapid atrial fibrillation.  Blood pressure is again in the 80s.  I have ordered IV Cardizem.  I have messaged his cardiologist.  We will give him his metoprolol which he was supposed to have taken this morning but  was held due to hypotension. [DT]   1358 Have given his morning medicines.  His cardiologist wanted to increase his sotalol to 160 mg in the morning so have ordered an additional dose of that.  They wanted to add an additional 25 mg of metoprolol succinate which I have ordered as well.  He remains in rapid atrial fibrillation with a systolic blood pressure of 99. [DT]   1415 Sinus rhythm in the 70s and 80s with blood pressure of 105/74. [DT]   1537 On repeat exam Mr. Simmons tells me he feels fine.  Blood pressure was in the 100 teens.  He is still going in and out of atrial fibrillation.  He alternates between a paced rhythm of 70 and rapid atrial fibrillation.  His rate is only going up to about 107 right now however.  I had him sit up on the side of the bed.  He told me he definitely felt better.  I recheck blood pressure sitting at the side of the bed and it is 108.  He tells me this is where his blood pressure normally runs.  It looks like he will be okay for discharge.  I have messaged his cardiology team and anticipate discharge. [DT]      ED Course User Index  [DT] Aron Mccarthy MD                                           Medical Decision Making  I observed Mr. Simmons for over 8 hours in the emergency department.  Giving multiple medications for his rapid atrial fibrillation's.  Consulted cardiology and spoke with them.  Ultimately he was deemed stable for discharge.    Atrial fibrillation with rapid ventricular response (HCC): chronic illness or injury with exacerbation, progression, or side effects of treatment  Hypotension, unspecified hypotension type: acute illness or injury that poses a threat to life or bodily functions  Amount and/or Complexity of Data Reviewed  External Data Reviewed: ECG and notes.  Labs: ordered. Decision-making details documented in ED Course.  Radiology: ordered. Decision-making details documented in ED Course.  ECG/medicine tests: ordered. Decision-making details documented  in ED Course.      Risk  OTC drugs.  Prescription drug management.          Final diagnoses:   Hypotension, unspecified hypotension type   Atrial fibrillation with rapid ventricular response (HCC)       ED Disposition  ED Disposition     ED Disposition   Discharge    Condition   --    Comment   Review needed by CMO to determine Physician of Record: Sundeep Zaidi MD  [address] [facility] [address]  [facility] [address] [phone]          Sal Verdugo, DO  [address]  [address]  [place] [id number]  [phone]               Medication List      Changed    metoprolol succinate XL 50 MG 24 hr tablet  Commonly known as: TOPROL-XL  Take 1.5 tablets by mouth Daily. May take extra half table PRN tachycardia  What changed:   · how much to take  · additional instructions     Sotalol HCl AF 80 MG tablet  Two tablets BID  What changed: additional instructions           Where to Get Your Medications      These medications were sent to [facility] [address] [place] [address] - [address] [place] [address] - [phone]  - [phone] [address] [address]    Phone: [phone]   · metoprolol succinate XL 50 MG 24 hr tablet  · Sotalol HCl AF 80 MG tablet          Aron Mccarthy MD  01/20/23 3196

## 2023-01-20 NOTE — DISCHARGE INSTRUCTIONS
Follow-up with  as well as your primary care doctor.  Come back anytime if you feel worse or have any other concerns.

## 2023-01-23 ENCOUNTER — TELEPHONE (OUTPATIENT)
Dept: CARDIOLOGY | Facility: CLINIC | Age: 63
End: 2023-01-23
Payer: COMMERCIAL

## 2023-01-25 DIAGNOSIS — I47.1 PAROXYSMAL SVT (SUPRAVENTRICULAR TACHYCARDIA): Primary | ICD-10-CM

## 2023-01-25 DIAGNOSIS — I48.0 PAROXYSMAL ATRIAL FIBRILLATION: ICD-10-CM

## 2023-01-25 NOTE — NURSING NOTE
"PRE-PVA ASSESSMENT  Arash Simmons 1960   [address] [facility] [address]   [phone] (work)  Referral Source: Dr Naqvi   Information obtained from: [x]? Medical record review  [x]? Patient report  Scheduled for: Redo PVA on 1/26/23 with Dr. Verdugo  No Known Allergies     AFib Specific History:  AFIBTYPE: paroxysmal          CHADS-VASc Risk Assessment              2 Total Score     1 CHF     1 Hypertension           Criteria that do not apply:     Age >/= 75     DM     PRIOR STROKE/TIA/THROMBO     Vascular Disease     Age 65-74     Sex: Female          Anticoagulation: Eliquis 5 mg bid NO MISSED DOSES  Cardioversion x 0  Failed AAD(s): sotalol   Prior Ablation: PVA Dr Verdugo 5/5/2022      Is Mr. Simmons aware of his AFib? Yes              Onset: \"just recently\"                          Exacerbations: none              Frequency: 27%               Alleviations: none                      Duration: mins                Symptoms:              []? Palpitations:                       []? Chest Discomfort:                [x]? Dizziness:                           [x]? Presyncope:               []? Lightheadedness:               []? Syncope:               [x]? Fatigue:                              [x]? Other: \"cold sweats\"              []? Short of Breath:      Last Echo(s):  [x]? TTE Date: 9/4/2020                                     EF: 25-30%                                           LA: 4.4 cm                                                         VHD mild valve abnormalities                                                           Past medical History:             []? Diabetes No                                                                            []? HYPOthyroidism No   []? HYPERthyroidism No          TSH   Date Value Ref Range Status   05/4/2022 2.480 0.270 - 4.200 uIU/mL Final      [x]? HTN                   [x]? Tx entresto               [x]? Controlled      [x]? Heart Failure/NICM     "                    []? CVA     No                           []? TIA            No        []? Ischemic                                         []? Hemorrhagic                        []? Nonischemic                      []? Embolic        []? Diastolic     []? CAD  No                              []? MI   No                            [x]? Dyslipidemia on Lipitor     [x]? Ischemic Evaluation  [x]? Stress Test: On 04/07/2015, abnormal myocardial perfusion study with evidence of dilated cardiomyopathy, ejection fraction of 16%, large fixed perfusion defect in the anterior apex, consistent with prior myocardial infarction.   [x]? Heart Cath: On 05/08/2015, cardiac catheterization  with EF of 10% to 15%.  Normal coronary arteries.  Severe left ventricular dilatation.       [x]? Sleep Apnea Suspected, snores, educated on MADDIE.  Pt will monitor and encouraged to call for a sleep medicine consult        [x]? Discussed with Mr. Simmons              []? Referral to        [x]? Declined by Mr. Simmons      []? Obesity       No 26.98                         []? Depression             [x]? Anxiety       Tx prozac                                                                      []? Urologic History No        []? Urologic cancer surgery                   []? Severe decrease in flow of urine stream                 []? Recent unexplained gross hematuria       []? Hx urethral stricture      Other Pertinent PMH: Status post biventricular ICD placement, August 2015, LBBB     Social / Lifestyle History:              [x]?   Tobacco:                      [x]? Former: 2 PPD x quit 1999                    [x]?   Alcohol:                        [x]? Current: 2 beers per day              [x]?   Caffeine: 1 cup per day              []?   Recreational Drugs: Denies               []?   Stress Issues: No               [x]?   Exercise: none , works 2 jobs from 630 - 830 pm      Summary of Patient Contact:    I spoke with Mr. Simmons about his  upcoming PVA.   He was well informed about the procedure from prior discussion with Dr. Verdugo and from reading the provided literature.  We discussed the procedure at length including risks, anesthesia, intra-op procedures, recovery, bedrest, sheath removal, discharge criteria, normal post-procedure expectations, and success rates.  I answered a few remaining questions. Mr. Simmons verbalized understanding and he is ready to proceed.      Carli Betancourt RN

## 2023-01-26 ENCOUNTER — HOSPITAL ENCOUNTER (OUTPATIENT)
Facility: HOSPITAL | Age: 63
Discharge: HOME | End: 2023-01-26
Attending: INTERNAL MEDICINE | Admitting: INTERNAL MEDICINE
Payer: COMMERCIAL

## 2023-01-26 ENCOUNTER — ANESTHESIA (OUTPATIENT)
Dept: CARDIOLOGY | Facility: HOSPITAL | Age: 63
End: 2023-01-26
Payer: COMMERCIAL

## 2023-01-26 ENCOUNTER — ANESTHESIA EVENT (OUTPATIENT)
Dept: CARDIOLOGY | Facility: HOSPITAL | Age: 63
End: 2023-01-26
Payer: COMMERCIAL

## 2023-01-26 VITALS
OXYGEN SATURATION: 92 % | BODY MASS INDEX: 29.12 KG/M2 | DIASTOLIC BLOOD PRESSURE: 66 MMHG | HEART RATE: 75 BPM | SYSTOLIC BLOOD PRESSURE: 102 MMHG | HEIGHT: 70 IN | WEIGHT: 203.4 LBS | RESPIRATION RATE: 16 BRPM | TEMPERATURE: 97.6 F

## 2023-01-26 DIAGNOSIS — I48.0 PAROXYSMAL ATRIAL FIBRILLATION: ICD-10-CM

## 2023-01-26 PROBLEM — I48.4 ATYPICAL ATRIAL FLUTTER: Status: ACTIVE | Noted: 2023-01-26

## 2023-01-26 PROBLEM — I48.3 TYPICAL ATRIAL FLUTTER (HCC): Status: ACTIVE | Noted: 2023-01-26

## 2023-01-26 PROBLEM — I48.19 ATRIAL FIBRILLATION, PERSISTENT (HCC): Status: ACTIVE | Noted: 2023-01-26

## 2023-01-26 LAB
ACT BLD: 293 SECONDS (ref 82–152)
ACT BLD: 329 SECONDS (ref 82–152)
ACT BLD: 444 SECONDS (ref 82–152)
ANION GAP SERPL CALCULATED.3IONS-SCNC: 11 MMOL/L (ref 5–15)
BUN SERPL-MCNC: 19 MG/DL (ref 8–23)
BUN/CREAT SERPL: 18.4 (ref 7–25)
CALCIUM SPEC-SCNC: 9 MG/DL (ref 8.6–10.5)
CHLORIDE SERPL-SCNC: 104 MMOL/L (ref 98–107)
CO2 SERPL-SCNC: 26 MMOL/L (ref 22–29)
CREAT SERPL-MCNC: 1.03 MG/DL (ref 0.76–1.27)
DEPRECATED RDW RBC AUTO: 44.8 FL (ref 37–54)
EGFRCR SERPLBLD CKD-EPI 2021: 82.6 ML/MIN/1.73
ERYTHROCYTE [DISTWIDTH] IN BLOOD BY AUTOMATED COUNT: 13 % (ref 12.3–15.4)
GLUCOSE SERPL-MCNC: 117 MG/DL (ref 65–99)
HCT VFR BLD AUTO: 43.4 % (ref 37.5–51)
HGB BLD-MCNC: 13.6 G/DL (ref 13–17.7)
MAGNESIUM SERPL-MCNC: 2.2 MG/DL (ref 1.6–2.4)
MCH RBC QN AUTO: 29.6 PG (ref 26.6–33)
MCHC RBC AUTO-ENTMCNC: 31.3 G/DL (ref 31.5–35.7)
MCV RBC AUTO: 94.6 FL (ref 79–97)
PLATELET # BLD AUTO: 266 10*3/MM3 (ref 140–450)
PMV BLD AUTO: 10.2 FL (ref 6–12)
POTASSIUM SERPL-SCNC: 4 MMOL/L (ref 3.5–5.2)
RBC # BLD AUTO: 4.59 10*6/MM3 (ref 4.14–5.8)
SODIUM SERPL-SCNC: 141 MMOL/L (ref 136–145)
WBC NRBC COR # BLD: 8.89 10*3/MM3 (ref 3.4–10.8)

## 2023-01-26 PROCEDURE — 93622 COMP EP EVAL L VENTR PAC&REC: CPT | Performed by: INTERNAL MEDICINE

## 2023-01-26 PROCEDURE — C1730 CATH, EP, 19 OR FEW ELECT: HCPCS | Performed by: INTERNAL MEDICINE

## 2023-01-26 PROCEDURE — 25010000002 PHENYLEPHRINE 10 MG/ML SOLUTION 1 ML VIAL

## 2023-01-26 PROCEDURE — 93623 PRGRMD STIMJ&PACG IV RX NFS: CPT | Performed by: INTERNAL MEDICINE

## 2023-01-26 PROCEDURE — 93656 COMPRE EP EVAL ABLTJ ATR FIB: CPT | Performed by: INTERNAL MEDICINE

## 2023-01-26 PROCEDURE — C1732 CATH, EP, DIAG/ABL, 3D/VECT: HCPCS | Performed by: INTERNAL MEDICINE

## 2023-01-26 PROCEDURE — C1766 INTRO/SHEATH,STRBLE,NON-PEEL: HCPCS | Performed by: INTERNAL MEDICINE

## 2023-01-26 PROCEDURE — 25010000002 PROTAMINE SULFATE PER 10 MG: Performed by: INTERNAL MEDICINE

## 2023-01-26 PROCEDURE — 25010000002 HEPARIN (PORCINE) PER 1000 UNITS: Performed by: INTERNAL MEDICINE

## 2023-01-26 PROCEDURE — 25010000002 DEXAMETHASONE PER 1 MG

## 2023-01-26 PROCEDURE — C1894 INTRO/SHEATH, NON-LASER: HCPCS | Performed by: INTERNAL MEDICINE

## 2023-01-26 PROCEDURE — 93655 ICAR CATH ABLTJ DSCRT ARRHYT: CPT | Performed by: INTERNAL MEDICINE

## 2023-01-26 PROCEDURE — C1760 CLOSURE DEV, VASC: HCPCS

## 2023-01-26 PROCEDURE — C1893 INTRO/SHEATH, FIXED,NON-PEEL: HCPCS | Performed by: INTERNAL MEDICINE

## 2023-01-26 PROCEDURE — S0260 H&P FOR SURGERY: HCPCS | Performed by: INTERNAL MEDICINE

## 2023-01-26 PROCEDURE — 80048 BASIC METABOLIC PNL TOTAL CA: CPT | Performed by: PHYSICIAN ASSISTANT

## 2023-01-26 PROCEDURE — 93010 ELECTROCARDIOGRAM REPORT: CPT | Performed by: INTERNAL MEDICINE

## 2023-01-26 PROCEDURE — 93657 TX L/R ATRIAL FIB ADDL: CPT | Performed by: INTERNAL MEDICINE

## 2023-01-26 PROCEDURE — C1760 CLOSURE DEV, VASC: HCPCS | Performed by: INTERNAL MEDICINE

## 2023-01-26 PROCEDURE — 25010000002 DOPAMINE PER 40 MG

## 2023-01-26 PROCEDURE — 93005 ELECTROCARDIOGRAM TRACING: CPT | Performed by: INTERNAL MEDICINE

## 2023-01-26 PROCEDURE — 36415 COLL VENOUS BLD VENIPUNCTURE: CPT

## 2023-01-26 PROCEDURE — C1769 GUIDE WIRE: HCPCS | Performed by: INTERNAL MEDICINE

## 2023-01-26 PROCEDURE — 25010000002 FENTANYL CITRATE (PF) 50 MCG/ML SOLUTION

## 2023-01-26 PROCEDURE — 85347 COAGULATION TIME ACTIVATED: CPT

## 2023-01-26 PROCEDURE — 25010000002 PROPOFOL 10 MG/ML EMULSION

## 2023-01-26 PROCEDURE — 0 LIDOCAINE 1 % SOLUTION: Performed by: INTERNAL MEDICINE

## 2023-01-26 PROCEDURE — C1759 CATH, INTRA ECHOCARDIOGRAPHY: HCPCS | Performed by: INTERNAL MEDICINE

## 2023-01-26 PROCEDURE — 83735 ASSAY OF MAGNESIUM: CPT | Performed by: PHYSICIAN ASSISTANT

## 2023-01-26 PROCEDURE — 25010000002 FUROSEMIDE PER 20 MG

## 2023-01-26 PROCEDURE — 25010000002 ONDANSETRON PER 1 MG

## 2023-01-26 PROCEDURE — 85027 COMPLETE CBC AUTOMATED: CPT | Performed by: PHYSICIAN ASSISTANT

## 2023-01-26 RX ORDER — LIDOCAINE HYDROCHLORIDE 10 MG/ML
INJECTION, SOLUTION EPIDURAL; INFILTRATION; INTRACAUDAL; PERINEURAL AS NEEDED
Status: DISCONTINUED | OUTPATIENT
Start: 2023-01-26 | End: 2023-01-26 | Stop reason: SURG

## 2023-01-26 RX ORDER — SPIRONOLACTONE 25 MG/1
25 TABLET ORAL DAILY
Status: DISCONTINUED | OUTPATIENT
Start: 2023-01-26 | End: 2023-01-26 | Stop reason: HOSPADM

## 2023-01-26 RX ORDER — HEPARIN SODIUM 10000 [USP'U]/100ML
INJECTION, SOLUTION INTRAVENOUS
Status: DISCONTINUED | OUTPATIENT
Start: 2023-01-26 | End: 2023-01-26 | Stop reason: HOSPADM

## 2023-01-26 RX ORDER — BUPIVACAINE HCL/0.9 % NACL/PF 0.125 %
PLASTIC BAG, INJECTION (ML) EPIDURAL AS NEEDED
Status: DISCONTINUED | OUTPATIENT
Start: 2023-01-26 | End: 2023-01-26 | Stop reason: SURG

## 2023-01-26 RX ORDER — PROTAMINE SULFATE 10 MG/ML
INJECTION, SOLUTION INTRAVENOUS
Status: DISCONTINUED | OUTPATIENT
Start: 2023-01-26 | End: 2023-01-26 | Stop reason: HOSPADM

## 2023-01-26 RX ORDER — HYDROMORPHONE HYDROCHLORIDE 1 MG/ML
0.5 INJECTION, SOLUTION INTRAMUSCULAR; INTRAVENOUS; SUBCUTANEOUS
Status: DISCONTINUED | OUTPATIENT
Start: 2023-01-26 | End: 2023-01-26 | Stop reason: HOSPADM

## 2023-01-26 RX ORDER — ACETAMINOPHEN 325 MG/1
650 TABLET ORAL EVERY 4 HOURS PRN
Status: DISCONTINUED | OUTPATIENT
Start: 2023-01-26 | End: 2023-01-26 | Stop reason: HOSPADM

## 2023-01-26 RX ORDER — FUROSEMIDE 10 MG/ML
INJECTION INTRAMUSCULAR; INTRAVENOUS AS NEEDED
Status: DISCONTINUED | OUTPATIENT
Start: 2023-01-26 | End: 2023-01-26 | Stop reason: SURG

## 2023-01-26 RX ORDER — BUPIVACAINE HYDROCHLORIDE 5 MG/ML
INJECTION, SOLUTION PERINEURAL
Status: DISCONTINUED | OUTPATIENT
Start: 2023-01-26 | End: 2023-01-26 | Stop reason: HOSPADM

## 2023-01-26 RX ORDER — DOPAMINE HYDROCHLORIDE 160 MG/100ML
INJECTION, SOLUTION INTRAVENOUS CONTINUOUS PRN
Status: DISCONTINUED | OUTPATIENT
Start: 2023-01-26 | End: 2023-01-26 | Stop reason: SURG

## 2023-01-26 RX ORDER — FENTANYL CITRATE 50 UG/ML
50 INJECTION, SOLUTION INTRAMUSCULAR; INTRAVENOUS
Status: DISCONTINUED | OUTPATIENT
Start: 2023-01-26 | End: 2023-01-26 | Stop reason: HOSPADM

## 2023-01-26 RX ORDER — ONDANSETRON 2 MG/ML
4 INJECTION INTRAMUSCULAR; INTRAVENOUS EVERY 6 HOURS PRN
Status: DISCONTINUED | OUTPATIENT
Start: 2023-01-26 | End: 2023-01-26 | Stop reason: HOSPADM

## 2023-01-26 RX ORDER — FENTANYL CITRATE 50 UG/ML
INJECTION, SOLUTION INTRAMUSCULAR; INTRAVENOUS AS NEEDED
Status: DISCONTINUED | OUTPATIENT
Start: 2023-01-26 | End: 2023-01-26 | Stop reason: SURG

## 2023-01-26 RX ORDER — FUROSEMIDE 40 MG/1
40 TABLET ORAL DAILY
Status: DISCONTINUED | OUTPATIENT
Start: 2023-01-26 | End: 2023-01-26 | Stop reason: HOSPADM

## 2023-01-26 RX ORDER — DEXAMETHASONE SODIUM PHOSPHATE 4 MG/ML
INJECTION, SOLUTION INTRA-ARTICULAR; INTRALESIONAL; INTRAMUSCULAR; INTRAVENOUS; SOFT TISSUE AS NEEDED
Status: DISCONTINUED | OUTPATIENT
Start: 2023-01-26 | End: 2023-01-26 | Stop reason: SURG

## 2023-01-26 RX ORDER — LIDOCAINE HYDROCHLORIDE 10 MG/ML
INJECTION, SOLUTION INFILTRATION; PERINEURAL
Status: DISCONTINUED | OUTPATIENT
Start: 2023-01-26 | End: 2023-01-26 | Stop reason: HOSPADM

## 2023-01-26 RX ORDER — ONDANSETRON 2 MG/ML
INJECTION INTRAMUSCULAR; INTRAVENOUS AS NEEDED
Status: DISCONTINUED | OUTPATIENT
Start: 2023-01-26 | End: 2023-01-26 | Stop reason: SURG

## 2023-01-26 RX ORDER — SODIUM CHLORIDE 9 MG/ML
INJECTION, SOLUTION INTRAVENOUS
Status: COMPLETED | OUTPATIENT
Start: 2023-01-26 | End: 2023-01-26

## 2023-01-26 RX ORDER — SODIUM CHLORIDE 9 MG/ML
INJECTION, SOLUTION INTRAVENOUS CONTINUOUS PRN
Status: DISCONTINUED | OUTPATIENT
Start: 2023-01-26 | End: 2023-01-26 | Stop reason: SURG

## 2023-01-26 RX ORDER — SODIUM CHLORIDE 9 MG/ML
40 INJECTION, SOLUTION INTRAVENOUS AS NEEDED
Status: DISCONTINUED | OUTPATIENT
Start: 2023-01-26 | End: 2023-01-26 | Stop reason: HOSPADM

## 2023-01-26 RX ORDER — ROCURONIUM BROMIDE 10 MG/ML
INJECTION, SOLUTION INTRAVENOUS AS NEEDED
Status: DISCONTINUED | OUTPATIENT
Start: 2023-01-26 | End: 2023-01-26 | Stop reason: SURG

## 2023-01-26 RX ORDER — HEPARIN SODIUM 1000 [USP'U]/ML
INJECTION, SOLUTION INTRAVENOUS; SUBCUTANEOUS
Status: DISCONTINUED | OUTPATIENT
Start: 2023-01-26 | End: 2023-01-26 | Stop reason: HOSPADM

## 2023-01-26 RX ORDER — PANTOPRAZOLE SODIUM 40 MG/1
40 TABLET, DELAYED RELEASE ORAL DAILY
Qty: 90 TABLET | Refills: 0 | Status: SHIPPED | OUTPATIENT
Start: 2023-01-26

## 2023-01-26 RX ORDER — EPHEDRINE SULFATE 50 MG/ML
INJECTION, SOLUTION INTRAVENOUS AS NEEDED
Status: DISCONTINUED | OUTPATIENT
Start: 2023-01-26 | End: 2023-01-26 | Stop reason: SURG

## 2023-01-26 RX ORDER — PROPOFOL 10 MG/ML
VIAL (ML) INTRAVENOUS AS NEEDED
Status: DISCONTINUED | OUTPATIENT
Start: 2023-01-26 | End: 2023-01-26 | Stop reason: SURG

## 2023-01-26 RX ORDER — NITROGLYCERIN 0.4 MG/1
0.4 TABLET SUBLINGUAL
Status: DISCONTINUED | OUTPATIENT
Start: 2023-01-26 | End: 2023-01-26 | Stop reason: HOSPADM

## 2023-01-26 RX ADMIN — LIDOCAINE HYDROCHLORIDE 50 MG: 10 INJECTION, SOLUTION EPIDURAL; INFILTRATION; INTRACAUDAL; PERINEURAL at 08:59

## 2023-01-26 RX ADMIN — ROCURONIUM BROMIDE 20 MG: 10 INJECTION, SOLUTION INTRAVENOUS at 11:19

## 2023-01-26 RX ADMIN — ROCURONIUM BROMIDE 40 MG: 10 INJECTION, SOLUTION INTRAVENOUS at 08:59

## 2023-01-26 RX ADMIN — PHENYLEPHRINE HYDROCHLORIDE 0.5 MCG/KG/MIN: 10 INJECTION INTRAVENOUS at 09:06

## 2023-01-26 RX ADMIN — DOPAMINE HYDROCHLORIDE IN DEXTROSE 5 MCG/KG/MIN: 1.6 INJECTION, SOLUTION INTRAVENOUS at 10:11

## 2023-01-26 RX ADMIN — FUROSEMIDE 60 MG: 10 INJECTION, SOLUTION INTRAMUSCULAR; INTRAVENOUS at 12:07

## 2023-01-26 RX ADMIN — PROPOFOL 80 MG: 10 INJECTION, EMULSION INTRAVENOUS at 08:59

## 2023-01-26 RX ADMIN — Medication 200 MCG: at 09:06

## 2023-01-26 RX ADMIN — DEXAMETHASONE SODIUM PHOSPHATE 8 MG: 4 INJECTION, SOLUTION INTRAMUSCULAR; INTRAVENOUS at 09:08

## 2023-01-26 RX ADMIN — ONDANSETRON 4 MG: 2 INJECTION INTRAMUSCULAR; INTRAVENOUS at 11:46

## 2023-01-26 RX ADMIN — EPHEDRINE SULFATE 5 MG: 50 INJECTION INTRAVENOUS at 09:04

## 2023-01-26 RX ADMIN — SUGAMMADEX 200 MG: 100 INJECTION, SOLUTION INTRAVENOUS at 11:48

## 2023-01-26 RX ADMIN — SODIUM CHLORIDE: 9 INJECTION, SOLUTION INTRAVENOUS at 08:49

## 2023-01-26 RX ADMIN — Medication 200 MCG: at 08:59

## 2023-01-26 RX ADMIN — ROCURONIUM BROMIDE 20 MG: 10 INJECTION, SOLUTION INTRAVENOUS at 10:25

## 2023-01-26 RX ADMIN — FENTANYL CITRATE 50 MCG: 50 INJECTION, SOLUTION INTRAMUSCULAR; INTRAVENOUS at 08:59

## 2023-01-26 RX ADMIN — EPHEDRINE SULFATE 5 MG: 50 INJECTION INTRAVENOUS at 09:17

## 2023-01-26 NOTE — ANESTHESIA PROCEDURE NOTES
Airway  Urgency: elective    Date/Time: 1/26/2023 9:03 AM  Airway not difficult    General Information and Staff    Patient location during procedure: OR  CRNA/CAA: Beth James CRNA    Indications and Patient Condition  Indications for airway management: airway protection    Preoxygenated: yes  MILS not maintained throughout  Mask difficulty assessment: 1 - vent by mask    Final Airway Details  Final airway type: endotracheal airway      Successful airway: ETT  Cuffed: yes   Successful intubation technique: direct laryngoscopy  Facilitating devices/methods: intubating stylet  Endotracheal tube insertion site: oral  Blade: Arango  Blade size: 4  ETT size (mm): 8.0  Cormack-Lehane Classification: grade I - full view of glottis  Placement verified by: chest auscultation and capnometry   Inital cuff pressure (cm H2O): 7  Measured from: lips  ETT/EBT  to lips (cm): 22  Number of attempts at approach: 1  Assessment: lips, teeth, and gum same as pre-op and atraumatic intubation    Additional Comments  Negative epigastric sounds, Breath sound equal bilaterally with symmetric chest rise and fall

## 2023-01-26 NOTE — ANESTHESIA PREPROCEDURE EVALUATION
Anesthesia Evaluation     Patient summary reviewed and Nursing notes reviewed   no history of anesthetic complications:  NPO Solid Status: > 8 hours  NPO Liquid Status: > 2 hours           Airway   Mallampati: I  TM distance: >3 FB  Neck ROM: full  No difficulty expected  Dental - normal exam     Pulmonary - normal exam   (+) a smoker Former,   Cardiovascular - normal exam    ECG reviewed  PT is on anticoagulation therapy    (+) pacemaker ICD interrogated <1 month ago, hypertension, dysrhythmias Atrial Fib, CHF Systolic <55%, hyperlipidemia,     ROS comment: 9/2020 echo EF 25-30% mild AR. Mild to mod MR    Neuro/Psych  (+) psychiatric history Anxiety,    (-) seizures, CVA  GI/Hepatic/Renal/Endo    (-) liver disease, no renal disease, diabetes, no thyroid disorder    Musculoskeletal         ROS comment: S/P cervical spinal fusion  Abdominal    Substance History      OB/GYN          Other   arthritis,                    Anesthesia Plan    ASA 3     general     (clearsight)  intravenous induction     Anesthetic plan, risks, benefits, and alternatives have been provided, discussed and informed consent has been obtained with: patient.    Plan discussed with CRNA.        CODE STATUS:

## 2023-01-26 NOTE — ANESTHESIA POSTPROCEDURE EVALUATION
Patient: Arash Simmons    Procedure Summary     Date: 01/26/23 Room / Location: [facility] CATH/EP LAB E / [facility] INVASIVE LOCATION    Anesthesia Start: 0849 Anesthesia Stop: 1215    Procedure: Ablation atrial fibrillation. Hold Sotalol x3 days. Stay on anticoagulation. NO CTA needed. Diagnosis:       Paroxysmal atrial fibrillation (HCC)      (afib)    Providers: Sal Verdugo DO Provider: Arash Lewis Jr., MD    Anesthesia Type: general ASA Status: 3          Anesthesia Type: general    Vitals  Vitals Value Taken Time   /64 01/26/23 1213   Temp     Pulse 77 01/26/23 1214   Resp     SpO2 99 % 01/26/23 1214   Vitals shown include unvalidated device data.        Post Anesthesia Care and Evaluation    Patient location during evaluation: PACU  Patient participation: complete - patient participated  Level of consciousness: awake and alert  Pain management: adequate    Airway patency: patent  Anesthetic complications: No anesthetic complications  PONV Status: none  Cardiovascular status: hemodynamically stable and acceptable  Respiratory status: nonlabored ventilation, acceptable and nasal cannula  Hydration status: acceptable    Comments: Temp 96 Kristy hugger applied

## 2023-01-26 NOTE — Clinical Note
Hemostasis started on the left femoral vein. Vascade MVP was used in achieving hemostasis. Closure device deployed in the vessel. Hemostasis achieved successfully. Closure device additional comment: LFV Vascade x 2

## 2023-01-26 NOTE — INTERVAL H&P NOTE
H&P reviewed. The patient was examined and there are no changes to the H&P.       EP Pre-Procedure Report  Cardiovascular Laboratory      Patient:  Arash Simmons  :  1960  PCP:  Sundeep Stone MD  PHONE:  [phone]    DATE: 2023      MEDICATIONS:  Prior to Admission medications    Medication Sig Start Date End Date Taking? Authorizing Provider   apixaban (ELIQUIS) 5 MG tablet tablet Take 1 tablet by mouth Every 12 (Twelve) Hours. 3/21/22  Yes Sal Verdugo DO   atorvastatin (LIPITOR) 10 MG tablet Take 1 tablet by mouth Every Night. 22  Yes Sundeep Stone MD   FLUoxetine (PROzac) 20 MG tablet Take 2 tablets by mouth Daily. 22  Yes Sundeep Stone MD   furosemide (LASIX) 40 MG tablet Take 1 tablet by mouth once daily 1/3/23  Yes Sundeep Stone MD   metoprolol succinate XL (TOPROL-XL) 50 MG 24 hr tablet Take 1.5 tablets by mouth Daily. May take extra half table PRN tachycardia 23  Yes Farhan Blackwood PA   Multiple Vitamins-Iron (MULTI-VITAMIN/IRON PO) Take 1 tablet by mouth Daily.   Yes Provider, MD Luz   sacubitril-valsartan (Entresto) 24-26 MG tablet Take 1 tablet by mouth Every 12 (Twelve) Hours. 22  Yes Sundeep Stone MD   Sotalol HCl AF 80 MG tablet Two tablets BID 23  Yes Farhan Blackwood PA   spironolactone (ALDACTONE) 25 MG tablet Take 1 tablet by mouth Daily. 22  Yes Sundeep Stone MD       Past medical & surgical history, social and family history reviewed in the electronic medical record.    Physical Exam:    Vitals:   Vitals:    23 0634   BP: 105/72   Pulse: (!) 133   Resp: 16   Temp: 97.5 °F (36.4 °C)   SpO2: 97%          23  0634   Weight: 92.3 kg (203 lb 6.4 oz)   Body mass index is 29.18 kg/m².    GENERAL: No apparent distress.  No significant changes since last exam.  CHEST: Clear to auscultation bilaterally no stridor no wheeze.  CV: S1, S2, Rapid and Regular without Murmurs, Rubs or  Gallops  EXTREMITIES: No edema.        Results from last 7 days   Lab Units 01/26/23  0635   SODIUM mmol/L 141   POTASSIUM mmol/L 4.0   CHLORIDE mmol/L 104   CO2 mmol/L 26.0   BUN mg/dL 19   CREATININE mg/dL 1.03   GLUCOSE mg/dL 117*   CALCIUM mg/dL 9.0     Results from last 7 days   Lab Units 01/26/23  0635   WBC 10*3/mm3 8.89   HEMOGLOBIN g/dL 13.6   HEMATOCRIT % 43.4   PLATELETS 10*3/mm3 266     Estimated Creatinine Clearance: 86 mL/min (by C-G formula based on SCr of 1.03 mg/dL).    IMPRESSION:atrial tachycardia      PLAN:  Procedure to perform:ablation         Electronically signed by LIBBY Grey, 01/26/23, 8:18 AM EST.

## 2023-01-26 NOTE — OP NOTE
Cardiac Electrophysiology Procedure Note           [facility] at [facility]         CATHETER ABLATION FOR ATRIAL FIBRILLATION (PVI)    PROCEDURES PERFORMED:    · Catheter ablation of persistent atrial fibrillation  · Adjunctive ablation of the left atrial posterior wall for persistent atrial fibrillation  · Adjunctive ablation of the base left atrial appendage for persistent atrial fibrillation  · Catheter ablation of SVT #1 a clockwise PAYAM mitral flutter  · Cath ablation of SVT #2 counterclockwise typical right atrial flutter  · Full diagnostic EP study  · 3D electroanatomic mapping  · drug infusion / programmed pacing  · Intracadiac Echocardiography  · Double transeptal puncture  · Left ventricular pacing and recording    PREPROCEDURAL DIAGNOSES:    1.  Persistent atrial fibrillation  2. [id number] score of 2  3.  Atypical left atrial tachycardia  4.  Typical right atrial flutter    POST PROCEDURE DIANGOSES:  As above.    INDICATION FOR PROCEDURE:  Briefly, Arash Simmons is a 62 y.o. year old male with a history of highly symptomatic recurrent paroxysmal as well as persistent forms of A. fib also atypical left atrial tachycardia.  This patient is status post left atrial ablation previously performed by myself.  The procedure was notable for absence of left atrial myopathy and a wide circumferential pulmonary vein isolation.  No additional adjunctive ablation was performed at that time.  He did well for period of time but then had recurrence of symptoms requiring antiarrhythmic drug therapy specifically sotalol.  He presented today electively for catheter ablation in atrial flutter which was felt to be atypical.    • ICD (implantable cardioverter-defibrillator), dual, in situ [Z95.810]   Yes       Priority: High       a. ICD shock 7/3/2020 due to SVT/atrial tachycardia at 220 bpm  b. ICD shock 8/21 due to atrial tachycardia with one-to-one conduction.  (Not A. Fib)  c. AT,  Sotalol added.      • NICM (nonischemic cardiomyopathy) ([facility]) [I42.8]   Yes       Priority: High       a. On 04/07/2015, abnormal myocardial perfusion study with evidence of dilated cardiomyopathy, ejection fraction of 16%, large fixed perfusion defect in the anterior apex, consistent with prior myocardial infarction.   b. On 05/08/2015, cardiac catheterization  with EF of 10% to 15%.  Normal coronary arteries.  Severe left ventricular dilatation.    c. Echo, 07/08/2015, LVEF 20%.   d. Status post biventricular ICD placement, August 2015.  e. 9/2020 echo EF 25-30% mild AR. Mild to mod MR.       • Long term current use of antiarrhythmic drug [Z79.899]   Not Applicable       Priority: Medium   • Chronic systolic congestive heart failure (HCC) [I50.22]   Yes       Priority: Medium   • LBBB (left bundle branch block) [I44.7]   Yes       Priority: Low   • Hypertension [I10]   Yes       Priority: Low   • Hyperlipidemia [E78.5]   Yes       Priority: Low   • Anxiety [F41.9]   Yes         ANTICOAGULATION STRATEGY PRIOR TO AND POST PROCEDURE: DOAC apixaban 5 mg twice daily.  The last dose of anticoagulant was confirmed to have been taken this morning.      PT/INR:  No results found for: LABPROT, INR  PTT:  No results found for: APTT    CBC:   WBC   Date Value Ref Range Status   01/26/2023 8.89 3.40 - 10.80 10*3/mm3 Final     RBC   Date Value Ref Range Status   01/26/2023 4.59 4.14 - 5.80 10*6/mm3 Final     Hemoglobin   Date Value Ref Range Status   01/26/2023 13.6 13.0 - 17.7 g/dL Final     Hematocrit   Date Value Ref Range Status   01/26/2023 43.4 37.5 - 51.0 % Final     MCV   Date Value Ref Range Status   01/26/2023 94.6 79.0 - 97.0 fL Final     RDW   Date Value Ref Range Status   01/26/2023 13.0 12.3 - 15.4 % Final     Platelets   Date Value Ref Range Status   01/26/2023 266 140 - 450 10*3/mm3 Final     BMP:     Sodium   Date Value Ref Range Status   01/26/2023 141 136 - 145 mmol/L Final     Potassium   Date Value Ref  "Range Status   01/26/2023 4.0 3.5 - 5.2 mmol/L Final     Chloride   Date Value Ref Range Status   01/26/2023 104 98 - 107 mmol/L Final     CO2   Date Value Ref Range Status   01/26/2023 26.0 22.0 - 29.0 mmol/L Final     BUN   Date Value Ref Range Status   01/26/2023 19 8 - 23 mg/dL Final     Creatinine   Date Value Ref Range Status   01/26/2023 1.03 0.76 - 1.27 mg/dL Final     eGFR   Date Value Ref Range Status   01/26/2023 82.6 >60.0 mL/min/1.73 Final       Vital Signs: /72 (BP Location: Left arm, Patient Position: Sitting)   Pulse (!) 133   Temp 97.5 °F (36.4 °C) (Temporal)   Resp 16   Ht 177.8 cm (70\")   Wt 92.3 kg (203 lb 6.4 oz)   SpO2 97%   BMI 29.18 kg/m²      Admit Weight:  92.3 kg (203 lb 6.4 oz)  BMI: Body mass index is 29.18 kg/m².    PROCEDURE NARRATIVE:    The patient was able to give written informed consent after revisiting the key portions of the risk versus benefit profile of the procedure.  This discussion was framed by our lengthy conversations  (please see our detailed notes).  Patient verbalized strong understanding of this discussion and a strong desire to proceed with the procedure.  Please note that this detailed informed consent process utilized mutual and shared decision making process between all parties involved, principally the physician and patient, but also potentially with input from the patient's selected family and friends.    The patient was brought to the EP laboratory in the post absorptive state.  The patient was electively intubated for the procedure and given a general anesthetic by colleagues from anesthesia.   An theresa-gastric tube was placed by anesthesia staff.  A radial artery catheter was placed by anesthesia staff for continuous blood pressure monitoring.  Please see the detailed anesthesia records.    The patient was then prepared and draped in a routing sterile fashion.  Seldinger access was obtained at the bilateral common femoral veins with 5 " venipunctures.  J tip wires were advanced into the vascular space.  Short 11,8,6 [place] sheaths, an SL0 sheath and a deflectable sheath were placed into the bilateral common femoral veins and the inferior vena cava / right atrium in an over the wire fashion.    The patient was anticoagulated with intravenous heparin (initial bolus and then continuous infusion) with a goal ACT of between 350 and 400 seconds.  A phased array ICE catheter was placed into the right atrium and right ventricle.  This was used for transeptal puncture, monitoring of the pericardial space, monitoring of the ablation catheter position within the heart and monitoring of other cardiac structures such as the left atrial appendage (to document the absence of thrombus), pulmonary veins, esophagus, mitral valve annulus and other cardiac structures.    Double transeptal puncture was performed after heparin administration with a combination of echocardiographic and fluoroscopic guidance.  This was performed with the long sheaths, a BRK needle, and a SafeSept transeptal wire.  Catheters and sheaths were advanced safely into the left atrium.  A 64 electrode Amrik diagnostic electrophysiology catheter and a [employer] irrigated tip ablation catheter were used for pacing and recording in the left atrium, left atrial appendage, pulmonary veins and left ventricle at various times throughout the procedure.  We used the [employer]ok[employer] 3D electroanatomic mapping system in conjunction with these catheters to construct an electroanatomic shell of the left atrium, left atrial appendage, mitral valve annulus, interatrial septum and pulmonary veins.  Left ventricular pacing and recording were performed by placing catheters safely and carefully across the mitral valve annulus to the left ventricle from the left atrium.  Adequate sensing and pacing thresholds were obtained from the left ventricle.  AV conduction ( antegrade ) as well as VA conduction ( retrograde )  were studied.  This was performed as a distinct addition to the diagnostic EP study.  Findings were conclusive for no accessory pathway and VA dissociation.    An EP study was performed.  Data obtained from this is listed in the below table:    Initial Study    Isuprel Washout Study           drive train / burst extra     LBBB    Rhythm          Atrial CL      R-R 1058    Ventricular CL          AVBCL      HV 45    AVNERP       drive train / burst extrastim   Slow Pway ERP      Rhythm     Fast Pway ERP      Atrial CL     VABCL conc? /  Dec?      Ventricular CL     VAERP      AVBCL 300    AERP      AVNERP 600 220   VERP      Slow Pway ERP     AP antegrade ERP      Fast Pway ERP     AP retrograde ERP      VABCL conc? /  Dec?           VAERP    Final Study      AERP      drive train / burst extrastim    VERP     Rhythm      AP antegrade ERP     Atrial CL      AP retrograde ERP     Ventricular CL           AVBCL     Isuprel Dose =      AVNERP       drive train / burst extrastim   Slow Pway ERP      Rhythm     Fast Pway ERP      Atrial CL     VABCL conc? /  Dec?      Ventricular CL     VAERP      AVBCL     AERP      AVNERP     VERP      Slow Pway ERP     AP antegrade ERP      Fast Pway ERP     AP retrograde ERP      VABCL conc? /  Dec?           VAERP           AERP           VERP           AP antegrade ERP           AP retrograde ERP                       Patient entered the room and atypical atrial flutter.  High-density activation mapping was performed.  More than 10,000 activation points were obtained in the left atrium.  This demonstrated reconnection of the pulmonary veins at the zohra bilaterally.  Patient's atrial flutter at a cycle length of 245 ms.  Coronary sinus activation was eccentric.  High-density mapping revealed a clockwise PAYAM mitral circuit.  This referred to his SVT #1.  We turned our attention to SVT #1.  This is a distinct mechanism from atrial fibrillation.  Linear  ablation was performed from the mitral valve annulus all the way up to the base of the left atrial appendage.  SVT #1 the patient's clinical atrial flutter terminated here to a second flutter.  Is referred to his SVT #2.    SVT #2 had a cycle length of 255 ms.  Coronary sinus activation was concentric.  Mapping revealed that this was a difficult counterclockwise right atrial flutter circuit.  We proceeded with A. fib ablation at this point and deferred SVT to ablation until later in the case.  See below.    Catheter ablation was performed to achieve pulmonary vein isolation.  A wide antral circumferential ablation approach was used.  Power was limited to 50 W on the posterior left atrium and 50 W elsewhere.  Lesions were made using proprietary local impedance technology target impedance drop of 15 ohms on the posterior wall and 20 to 25 ohms elsewhere.       The patient remained in atrial fibrillation.  Adjunctive ablation was performed to achieve isolation of the entire left atrial posterior wall.  At no time was any ablation near the esophagus performed to achieve left atrial posterior wall isolation.    The patient remained in atrial fibrillation.  Adjunctive ablation was performed to achieve isolation of the base of the left atrial appendage.  The left atrial appendage was not isolated in total, rather just the base of it. This achieved  total voltage abatement, as well as total isolation with both entrance and exit block from within this area.    Esophagus was protected throughout with the attune device.    After completing the antral ablation lesion set, I interrogated the pulmonary veins using and documented pulmonary vein isolation.  Also demonstrated isolation of the entire left atrial posterior wall as well as the base left atrial appendage.    We focused at this time back on SVT #2 which remained.  We turned our attention to performing typical atrial flutter ablation.  Please note that this is a separate  SVT with a distinct mechanism from the patients atrial fibrillation.  We used the 3 dimensional electroanatomic mapping system to reconstruct a detailed structure of the right atrium AV node his bundle position and triangle of [place].  We confirmed that the mechanism of tachycardia was indeed typical right atrial flutter.  This was performed by careful review of the P-wave morphology on the surface EKG.  This was consistent with typical counter-clockwise right atrial flutter.  Additionally reviewed the electrograms along the cavo tricuspid isthmus and found that these were exactly during atrial mid diastole.  Entrainment mapping also was performed and demonstrated a perfect post pacing interval along the cavo tricuspid isthmus indicating that this was the critical isthmus of the tachycardia circuit indeed the inner loop.  This entrainment mapping indicated that this was concealed entrainment.    Ablation was performed along the cavo tricuspid isthmus beginning at the ventricular aspect in extended to the caval aspect of the cavo tricuspid isthmus.  Catheter ablation was performed with a maximum of 50 w power was performed along the isthmus until all electrograms were eliminated.  The patient's tachycardia terminated during catheter ablation.   Sinus rhythm ensued.    There was no evidence of significant sinus node dysfunction.    We then demonstrated that there was bidirectional block with differential pacing maneuvers.    Isuprel at 10 mcg/min was administered intravenously following ablation to test for other atrial and or ventricular arrhythmias.   Programmed stimulation was performed in an attempt to induce other and additional arrhythmias.  No arrhythmias were induced during administration and washout.    The ICE catheter revealed that there was no pericardial effusion.    Catheters and sheaths were then removed from the body.    Hemostasis was achieved with Vascade closure devices x 5.      The patient was  extubated in routine fashion and transferred to PACU in stable condition.    COMPLICATIONS: None    EBL: minimal    RADIATION EXPOSURE: 60 mGy over 10 minutes    KEY PROCEDURAL FINDINGS:  · Catheter ablation of atypical left atrial PAYAM mitral flutter  · Catheter ablation of typical right atrial flutter  · Ablation of all 4 pulmonary veins with minimal reconnection at the zohra bilaterally  · Catheter ablation of left atrial posterior wall with entire left atrial posterior wall isolation  · Catheter ablation of the base left atrial appendage without isolation of the left atrial appendage  · Modest amount of left atrial myopathy    POST PROCEDURAL PLAN:    ·  Report was called the the PACU nurse responsible for the patients care.  · Uninterrupted anticoagulation for not less than 90 days unless specially instructed otherwise by myself or another member of our EP physician team.  Please note that the patient and the patient’s family have been extensively counseled about this critical requirement and have agreed to comply.  · Esophageal prophylaxis with proton pump inhibitor for 90 days.  · Anticipate discharge tomorrow.  · Medications were reconciled, and key changes in medications include: Stop sotalol  · Patient and family instructed to call immediately for fever greater than 101.5 degrees F, hematemesis, increasing or severe chest pain, increasing shortness of breath, bleeding or other concerns.  Patient and family instructed that some chest discomfort is normal and is to be expected and that this should be expected to decrease over the first 3-4 days after the ablation procedure.  · The patient will be seen at our office per routine follow up.      Sal Verdugo DO, FAC, [facility]  Cardiac Electrophysiologist  [facility] / [facility]

## 2023-01-26 NOTE — Clinical Note
Replaced previous sheath in the right femoral vein. RFV SRO and Agilis exchanged back to two short 8FR sheaths over wire

## 2023-01-26 NOTE — Clinical Note
Hemostasis started on the right femoral vein. Vascade MVP was used in achieving hemostasis. Closure device deployed in the vessel. Hemostasis achieved successfully. Closure device additional comment: RFV Ney x 3

## 2023-01-27 ENCOUNTER — CALL CENTER PROGRAMS (OUTPATIENT)
Dept: CALL CENTER | Facility: HOSPITAL | Age: 63
End: 2023-01-27
Payer: COMMERCIAL

## 2023-01-27 NOTE — OUTREACH NOTE
PCI/Device Survey    Flowsheet Row Responses   Facility patient discharged from? [facility]   Procedure date 01/26/23   Procedure (if device, specify in description) Ablation   Performing MD Dr. Sal Verdugo   Attempt successful? Yes   Call start time 0940   Call end time 0945   Has the patient had any of the following symptoms since discharge? Shortness of breath   Nursing interventions Patient education provided   Symptom comments Chest is sore per pt. SOA is baseline for him. He feels so much better than he has in a while. Bilat groin dressings in place.   Is the patient taking prescribed medications: --  [Eliquis]   Nursing intervention Reminded to continue to take prescribed medications   Nursing intervention Patient education provided   Does the patient have an appointment scheduled with the cardiologist? Yes   If the patient is a current smoker, are they able to teach back resources for cessation? Not a smoker   Did the patient feel prepared to go home on the same day as the procedure? Yes   Is the patient satisfied with the same day discharge process? Yes   PCI/Device call completed Yes          CRUZ CHIN - Elias Nurse

## 2023-02-02 LAB
QT INTERVAL: 452 MS
QTC INTERVAL: 508 MS

## 2023-02-14 ENCOUNTER — TELEPHONE (OUTPATIENT)
Dept: CARDIOLOGY | Facility: CLINIC | Age: 63
End: 2023-02-14
Payer: COMMERCIAL

## 2023-02-14 RX ORDER — METOPROLOL SUCCINATE 50 MG/1
75 TABLET, EXTENDED RELEASE ORAL DAILY
Qty: 135 TABLET | Refills: 2 | Status: SHIPPED | OUTPATIENT
Start: 2023-02-14

## 2023-02-14 NOTE — TELEPHONE ENCOUNTER
Arash Simmons Key: [id number] - [place] Case ID: [id number]   Rx #: [id number]  Need help? Call us at [phone]  Status  Sent to [facility]  Drug  Metoprolol Succinate ER 50MG er tablets

## 2023-03-22 ENCOUNTER — TELEPHONE (OUTPATIENT)
Dept: FAMILY MEDICINE CLINIC | Facility: CLINIC | Age: 63
End: 2023-03-22
Payer: COMMERCIAL

## 2023-03-22 NOTE — TELEPHONE ENCOUNTER
Caller: Arash Simmons    Relationship: Self    Best call back number: [phone]    What medication are you requesting: SOMETHING FOR SINUS, HEAD CONGESTION, AND DRAINAGE    What are your current symptoms: SINUS AND HEAD CONGESTION AND DRAINAGE       How long have you been experiencing symptoms: 2 WEEKS  Have you had these symptoms before:    [x] Yes  [] No    Have you been treated for these symptoms before:   [x] Yes  [] No    If a prescription is needed, what is your preferred pharmacy and phone number: [address] [phone] [facility] [phone]      Additional notes:  THE PATIENT IS REQUESTING SOMETHING FOR SINUS AND HEAD CONGESTION AND DRAINAGE THAT IS SAFE FOR HIM TO TAKE WITH HIS BLOOD PRESSURE AND HEART MEDICATIONS. THE PATIENT ADVISED IF THE DOCTOR DOES NOT WANT TO CALL SOMETHING IN, THE PATIENT IS REQUESTING A CALL BACK WITH [employer] OF WHAT OVER THE COUNTER MEDICATION IS SAFE FOR HIM TO TAKE. PLEASE CALL AND [employer]

## 2023-03-24 RX ORDER — SOTALOL HYDROCHLORIDE 80 MG/1
TABLET ORAL
Qty: 180 TABLET | Refills: 5 | Status: SHIPPED | OUTPATIENT
Start: 2023-03-24

## 2023-04-04 RX ORDER — FUROSEMIDE 40 MG/1
TABLET ORAL
Qty: 90 TABLET | Refills: 0 | Status: SHIPPED | OUTPATIENT
Start: 2023-04-04

## 2023-04-10 RX ORDER — APIXABAN 5 MG/1
TABLET, FILM COATED ORAL
Qty: 180 TABLET | Refills: 1 | Status: SHIPPED | OUTPATIENT
Start: 2023-04-10

## 2023-04-24 ENCOUNTER — OFFICE VISIT (OUTPATIENT)
Dept: CARDIOLOGY | Facility: CLINIC | Age: 63
End: 2023-04-24
Payer: COMMERCIAL

## 2023-04-24 VITALS
SYSTOLIC BLOOD PRESSURE: 110 MMHG | WEIGHT: 206 LBS | BODY MASS INDEX: 29.49 KG/M2 | HEART RATE: 64 BPM | OXYGEN SATURATION: 96 % | HEIGHT: 70 IN | DIASTOLIC BLOOD PRESSURE: 68 MMHG

## 2023-04-24 DIAGNOSIS — I48.19 PERSISTENT ATRIAL FIBRILLATION: Primary | ICD-10-CM

## 2023-04-24 NOTE — PROGRESS NOTES
"                   Cardiac Electrophysiology Outpatient Follow Up Note            [facility]    Follow Up Office Visit      Arash Simmons  [id number]      Primary Care Physician: Sundeep Stone MD        Subjective     Chief Complaint:   There are no diagnoses linked to this encounter.  Chief Complaint   Patient presents with   • Follow-up     Paroxysmal atrial fibrillation       History of Present Illness:   Arash Simmons is a 62 y.o. male who presents to  electrophysiology clinic for follow up persistent A-fib, SVT, right and left atrial flutter.  The patient underwent successful procedure with extensive ablation of pulmonary veins, left atrial posterior wall, base of left atrial appendage SVT clockwise mitral atrial flutter and counterclockwise typical right atrial flutter ablation by Dr. Verdugo January 26, 2023.  The patient is extremely happy with the results of the procedure.  He states Dr. Verdugo is a \"miracle worker\".  He is feeling so much better than he has in many years.  He has not any difficulty chest pain chest tightness dizziness near syncope or syncope.  He is tolerating his anticoagulation well.       Past Medical History:   Past Medical History:   Diagnosis Date   • Anxiety    • Arthritis    • Atrial fibrillation    • CHF (congestive heart failure)    • Dyspnea on exertion    • Hyperlipidemia    • Hypertension    • LBBB (left bundle branch block)    • Lower back pain    • Shortness of breath        Past Surgical History:   Past Surgical History:   Procedure Laterality Date   • ADENOIDECTOMY     • ANTERIOR CERVICAL DISCECTOMY W/ FUSION N/A 09/19/2018    Procedure: ANTERIOR CERVICAL DECOMPRESSION WITH FUSION C4-5 RIGHT;  Surgeon: Israel Garcia MD;  Location: [facility];  Service: Orthopedic Spine   • CARDIAC CATHETERIZATION  2017    X2   • CARDIAC DEFIBRILL[place]ACEMENT      [facility] [place]   • CARDIAC ELECTROPHYSIOLOGY PROCEDURE N/A " 2022    Procedure: Ablation atrial fibrillation, Rhythmia. Stop Sotalol 3 days prior to procedure;  Surgeon: Sal Verdugo DO;  Location:  [facility];  Service: Cardiovascular;  Laterality: N/A;   • CARDIAC ELECTROPHYSIOLOGY PROCEDURE N/A 2023    Procedure: Ablation atrial fibrillation. Hold Sotalol x3 days. Stay on anticoagulation. NO CTA needed.;  Surgeon: Sal Verdugo DO;  Location:  [facility];  Service: Cardiovascular;  Laterality: N/A;   • COLONOSCOPY     • ELBOW PROCEDURE Right    • PACEMAKER IMPLANTATION  2015    ICD, PLACED PER MANUEL AND NOW F/U WITH CONSTANTINO    • TONSILLECTOMY     • TOTAL KNEE ARTHROPLASTY Left 10/2017       Family History:   Family History   Problem Relation Age of Onset   • COPD Mother    • Emphysema Mother    • Diabetes Mother    • COPD Father    • Other Father         black lung        Social History:   Social History     Socioeconomic History   • Marital status:    Tobacco Use   • Smoking status: Former     Packs/day: 2.00     Years: 30.00     Pack years: 60.00     Types: Cigarettes     Quit date: 1999     Years since quittin.7     Passive exposure: Past   • Smokeless tobacco: Never   Vaping Use   • Vaping Use: Never used   Substance and Sexual Activity   • Alcohol use: Yes     Alcohol/week: 14.0 standard drinks     Types: 14 Cans of beer per week     Comment: 2 beers per day   • Drug use: Never   • Sexual activity: Defer     Partners: Female     Comment:        Medications:     Current Outpatient Medications:   •  atorvastatin (LIPITOR) 10 MG tablet, Take 1 tablet by mouth Every Night., Disp: 90 tablet, Rfl: 1  •  Eliquis 5 MG tablet tablet, TAKE 1 TABLET BY MOUTH EVERY 12 HOURS, Disp: 180 tablet, Rfl: 1  •  FLUoxetine (PROzac) 20 MG tablet, Take 2 tablets by mouth Daily., Disp: 180 tablet, Rfl: 1  •  furosemide (LASIX) 40 MG tablet, Take 1 tablet by mouth once daily, Disp: 90 tablet, Rfl: 0  •  metoprolol  "succinate XL (TOPROL-XL) 50 MG 24 hr tablet, Take 1.5 tablets by mouth Daily. May take extra half table PRN tachycardia, Disp: 135 tablet, Rfl: 2  •  pantoprazole (Protonix) 40 MG EC tablet, Take 1 tablet by mouth Daily., Disp: 90 tablet, Rfl: 0  •  sacubitril-valsartan (Entresto) 24-26 MG tablet, Take 1 tablet by mouth Every 12 (Twelve) Hours., Disp: 180 tablet, Rfl: 1  •  sotalol (BETAPACE AF) 80 MG tablet tablet, TAKE 1 TABLET BY MOUTH IN THE MORNING AND 2 IN THE EVENING (Patient taking differently: TAKE 2 TABLET BY MOUTH IN THE MORNING AND 2 IN THE EVENING), Disp: 180 tablet, Rfl: 5  •  spironolactone (ALDACTONE) 25 MG tablet, Take 1 tablet by mouth Daily., Disp: 90 tablet, Rfl: 1    Allergies:   No Known Allergies    Objective   Vital Signs:   Vitals:    04/24/23 1524   BP: 110/68   BP Location: Left arm   Patient Position: Sitting   Cuff Size: Adult   Pulse: 64   SpO2: 96%   Weight: 93.4 kg (206 lb)   Height: 177.8 cm (70\")       PHYSICAL EXAM  General appearance: Awake, alert, cooperative  Head: Normocephalic, without obvious abnormality, atraumatic  Eyes: Conjunctivae/corneas clear, EOMs intact  Neck: no adenopathy, no carotid bruit, no JVD and thyroid: not enlarged  Lungs: clear to auscultation bilaterally and no rhonchi or crackles\", ' symmetric  Heart: regular rate and rhythm, S1, S2 normal, no murmur, click, rub or gallop  Abdomen: Soft, non-tender, bowel sounds normal,  no organomegaly  Extremities: extremities normal, atraumatic, no cyanosis or edema  Skin: Skin color, turgor normal, no rashes or lesions  Neurologic: Grossly normal     Lab Results   Component Value Date    GLUCOSE 117 (H) 01/26/2023    CALCIUM 9.0 01/26/2023     01/26/2023    K 4.0 01/26/2023    CO2 26.0 01/26/2023     01/26/2023    BUN 19 01/26/2023    CREATININE 1.03 01/26/2023    EGFRIFAFRI 100 12/22/2021    EGFRIFNONA 83 12/22/2021    BCR 18.4 01/26/2023    ANIONGAP 11.0 01/26/2023     Lab Results   Component Value Date "    WBC 8.89 01/26/2023    HGB 13.6 01/26/2023    HCT 43.4 01/26/2023    MCV 94.6 01/26/2023     01/26/2023     Lab Results   Component Value Date    INR 0.99 10/11/2017    INR 1.02 08/20/2015    PROTIME 10.7 08/20/2015     Lab Results   Component Value Date    TSH 2.480 05/04/2022       Cardiac Testing:     I personally viewed and interpreted the patient's EKG/Telemetry/lab data      ECG 12 Lead    Date/Time: 4/24/2023 4:00 PM  Performed by: Farhan Colón PA  Authorized by: Farhan Colón PA   Comparison: compared with previous ECG from 2/2/2023  Similar to previous ECG  Rhythm: sinus rhythm  Rate: normal  Conduction: conduction normal  ST Segments: ST segments normal  QRS axis: normal  Other: no other findings    Clinical impression: normal ECG            Tobacco Cessation: N/A  Obstructive Sleep Apnea Screening: Completed    Assessment & Plan    There are no diagnoses linked to this encounter.     Diagnosis Plan   1. Paroxysmal atrial fibrillation (HCC)   status post extensive ablation by Dr. Verdugo January 26, 2023 which includes PVA, right and left atrial flutters SVT.  The patient tolerated procedure well he is quite happy results.  He feels better than he had a long time.  He remains on sotalol therapy.  EKG today stable continue monitor for toxicity.   2. Long term current use of antiarrhythmic drug   sotalol.  Doing well.   3. ICD (implantable cardioverter-defibrillator), dual, in situ   device interrogation bivicd.  DDDR at 60.  A paced 13%.  BiV paced 87 to 90%.  1.5 years remaining on generator.  No significant arrhythmias since ablation..      This patient's Cardiac Implanted Electronic Device was manually interrogated and reprogrammed during the patient encounter today.  Iterative programming changes were manually made to determine the sensing threshold, pacing threshold, lead impedance as well as underlying cardiac rhythm.  These programming changes were not limited to but included  some or all of the following when appropriate: pacing mode, programmed AV delays, blanking periods, and refractory periods.  Data obtained as a result of these manual programing changes informed the patient's CIED permanent programming.   4. LBBB (left bundle branch block)   CRT in situ.   5. NICM (nonischemic cardiomyopathy) (HCC)   euvolemic.  Good medical therapy Toprol-XL Entresto.   6. Primary hypertension   blood pressure reasonably well controlled.   7. Chronic systolic congestive heart failure (HCC)   euvolemic.  No further diuresis required.   Continue current medical therapy, including sotalol.  Stable EKG monitor for toxicity.  Return for follow-up as scheduled.    [facility] / [facility]    Electronically signed by LIBBY Enriquez, 04/24/23, 3:59 PM EDT.

## 2023-06-07 DIAGNOSIS — I10 ESSENTIAL HYPERTENSION: ICD-10-CM

## 2023-06-07 DIAGNOSIS — I50.22 CHRONIC SYSTOLIC CONGESTIVE HEART FAILURE: ICD-10-CM

## 2023-06-07 RX ORDER — SPIRONOLACTONE 25 MG/1
TABLET ORAL
Qty: 90 TABLET | Refills: 0 | Status: SHIPPED | OUTPATIENT
Start: 2023-06-07

## 2023-06-10 DIAGNOSIS — F41.9 ANXIETY: ICD-10-CM

## 2023-06-12 RX ORDER — FLUOXETINE 20 MG/1
TABLET, FILM COATED ORAL
Qty: 180 TABLET | Refills: 0 | OUTPATIENT
Start: 2023-06-12

## 2023-06-14 ENCOUNTER — TELEPHONE (OUTPATIENT)
Dept: FAMILY MEDICINE CLINIC | Facility: CLINIC | Age: 63
End: 2023-06-14
Payer: COMMERCIAL

## 2023-06-14 DIAGNOSIS — F41.9 ANXIETY: ICD-10-CM

## 2023-06-14 RX ORDER — FLUOXETINE 20 MG/1
40 TABLET, FILM COATED ORAL DAILY
Qty: 60 TABLET | Refills: 0 | Status: SHIPPED | OUTPATIENT
Start: 2023-06-14

## 2023-06-14 NOTE — TELEPHONE ENCOUNTER
Caller: Arash Simmons    Relationship: Self    Best call back number:[phone]    What medications are you currently taking:   Current Outpatient Medications on File Prior to Visit   Medication Sig Dispense Refill    atorvastatin (LIPITOR) 10 MG tablet Take 1 tablet by mouth Every Night. 90 tablet 1    Eliquis 5 MG tablet tablet TAKE 1 TABLET BY MOUTH EVERY 12 HOURS 180 tablet 1    FLUoxetine (PROzac) 20 MG tablet Take 2 tablets by mouth Daily. 180 tablet 1    furosemide (LASIX) 40 MG tablet Take 1 tablet by mouth once daily 90 tablet 0    metoprolol succinate XL (TOPROL-XL) 50 MG 24 hr tablet Take 1.5 tablets by mouth Daily. May take extra half table PRN tachycardia 135 tablet 2    pantoprazole (Protonix) 40 MG EC tablet Take 1 tablet by mouth Daily. 90 tablet 0    sacubitril-valsartan (Entresto) 24-26 MG tablet Take 1 tablet by mouth Every 12 (Twelve) Hours. 180 tablet 1    sotalol (BETAPACE AF) 80 MG tablet tablet TAKE 1 TABLET BY MOUTH IN THE MORNING AND 2 IN THE EVENING (Patient taking differently: TAKE 2 TABLET BY MOUTH IN THE MORNING AND 2 IN THE EVENING) 180 tablet 5    spironolactone (ALDACTONE) 25 MG tablet Take 1 tablet by mouth once daily 90 tablet 0     No current facility-administered medications on file prior to visit.        What are your concerns: PT WILL LIKE TO KNOW WHY RX  FLUoxetine (PROzac) 20 MG tablet  WAS DENIED

## 2023-07-24 ENCOUNTER — OFFICE VISIT (OUTPATIENT)
Dept: CARDIOLOGY | Facility: CLINIC | Age: 63
End: 2023-07-24
Payer: COMMERCIAL

## 2023-07-24 VITALS
HEIGHT: 69 IN | OXYGEN SATURATION: 94 % | BODY MASS INDEX: 30.36 KG/M2 | WEIGHT: 205 LBS | SYSTOLIC BLOOD PRESSURE: 102 MMHG | DIASTOLIC BLOOD PRESSURE: 50 MMHG | HEART RATE: 66 BPM

## 2023-07-24 DIAGNOSIS — I10 PRIMARY HYPERTENSION: Primary | ICD-10-CM

## 2023-07-24 DIAGNOSIS — Z95.810 ICD (IMPLANTABLE CARDIOVERTER-DEFIBRILLATOR), DUAL, IN SITU: ICD-10-CM

## 2023-07-24 DIAGNOSIS — I44.7 LBBB (LEFT BUNDLE BRANCH BLOCK): ICD-10-CM

## 2023-07-24 DIAGNOSIS — I47.1 PAROXYSMAL SVT (SUPRAVENTRICULAR TACHYCARDIA): ICD-10-CM

## 2023-07-24 PROCEDURE — 93000 ELECTROCARDIOGRAM COMPLETE: CPT | Performed by: PHYSICIAN ASSISTANT

## 2023-07-24 PROCEDURE — 99214 OFFICE O/P EST MOD 30 MIN: CPT | Performed by: PHYSICIAN ASSISTANT

## 2023-07-24 NOTE — PROGRESS NOTES
Cardiac Electrophysiology Outpatient Follow Up Note            [facility] at [facility]    Follow Up Office Visit      Arash Simmons  [id number]      Primary Care Physician: Sundeep Stone MD        Subjective     Chief Complaint:   Diagnoses and all orders for this visit:    1. Primary hypertension (Primary)    2. LBBB (left bundle branch block)    3. ICD (implantable cardioverter-defibrillator), dual, in situ    4. Paroxysmal SVT (supraventricular tachycardia)      Chief Complaint   Patient presents with    NICM (nonischemic cardiomyopathy)       History of Present Illness:   Arash Simmons is a 63 y.o. male who presents to  electrophysiology clinic for follow up persistent A-fib, SVT, right and left atrial flutter.  The patient underwent successful procedure with extensive ablation of pulmonary veins, left atrial posterior wall, base of left atrial appendage SVT clockwise mitral atrial flutter and counterclockwise typical right atrial flutter ablation by Dr. Verdugo January 26, 2023. He continues to do well since procedure. He is working two jobs. He feels better than he has in a long time.   Past Medical History:   Past Medical History:   Diagnosis Date    Anxiety     Arthritis     Atrial fibrillation     CHF (congestive heart failure)     Dyspnea on exertion     Hyperlipidemia     Hypertension     LBBB (left bundle branch block)     Lower back pain     Shortness of breath        Past Surgical History:   Past Surgical History:   Procedure Laterality Date    ABLATION OF DYSRHYTHMIC FOCUS      ADENOIDECTOMY      ANTERIOR CERVICAL DISCECTOMY W/ FUSION N/A 09/19/2018    Procedure: ANTERIOR CERVICAL DECOMPRESSION WITH FUSION C4-5 RIGHT;  Surgeon: Israel Garcia MD;  Location: [facility];  Service: Orthopedic Spine    CARDIAC CATHETERIZATION  2017    X2    CARDIAC DEFIBRILLATOR PLACEMENT      [facility] biventricular ICD August 2015    CARDIAC ELECTROPHYSIOLOGY PROCEDURE N/A  2022    Procedure: Ablation atrial fibrillation, Rhythmia. Stop Sotalol 3 days prior to procedure;  Surgeon: Sal Verdugo DO;  Location:  [facility];  Service: Cardiovascular;  Laterality: N/A;    CARDIAC ELECTROPHYSIOLOGY PROCEDURE N/A 2023    Procedure: Ablation atrial fibrillation. Hold Sotalol x3 days. Stay on anticoagulation. NO CTA needed.;  Surgeon: Sal Verdugo DO;  Location:  [facility];  Service: Cardiovascular;  Laterality: N/A;    COLONOSCOPY      ELBOW PROCEDURE Right 1973    PACEMAKER IMPLANTATION      ICD, PLACED PER RUKAVINILANNE AND NOW F/U WITH CONSTANTINO     TONSILLECTOMY      TOTAL KNEE ARTHROPLASTY Left 10/2017       Family History:   Family History   Problem Relation Age of Onset    COPD Mother     Emphysema Mother     Diabetes Mother     COPD Father     Other Father         black lung        Social History:   Social History     Socioeconomic History    Marital status:    Tobacco Use    Smoking status: Former     Packs/day: 2.00     Years: 30.00     Pack years: 60.00     Types: Cigarettes     Quit date: 1999     Years since quittin.0     Passive exposure: Past    Smokeless tobacco: Never   Vaping Use    Vaping Use: Never used   Substance and Sexual Activity    Alcohol use: Yes     Alcohol/week: 14.0 standard drinks     Types: 14 Cans of beer per week     Comment: 2 beers per day    Drug use: Never    Sexual activity: Defer     Partners: Female     Comment:        Medications:     Current Outpatient Medications:     atorvastatin (LIPITOR) 10 MG tablet, Take 1 tablet by mouth Every Night., Disp: 90 tablet, Rfl: 1    Eliquis 5 MG tablet tablet, TAKE 1 TABLET BY MOUTH EVERY 12 HOURS, Disp: 180 tablet, Rfl: 1    FLUoxetine (PROzac) 20 MG tablet, Take 2 tablets by mouth Daily., Disp: 180 tablet, Rfl: 1    furosemide (LASIX) 40 MG tablet, Take 1 tablet by mouth once daily, Disp: 90 tablet, Rfl: 0    metoprolol succinate XL  "(TOPROL-XL) 50 MG 24 hr tablet, Take 1.5 tablets by mouth Daily. May take extra half table PRN tachycardia, Disp: 135 tablet, Rfl: 2    sacubitril-valsartan (Entresto) 24-26 MG tablet, Take 1 tablet by mouth Every 12 (Twelve) Hours., Disp: 180 tablet, Rfl: 1    sotalol (BETAPACE AF) 80 MG tablet tablet, TAKE 1 TABLET BY MOUTH IN THE MORNING AND 2 IN THE EVENING (Patient taking differently: TAKE 2 TABLET BY MOUTH IN THE MORNING AND 2 IN THE EVENING), Disp: 180 tablet, Rfl: 5    spironolactone (ALDACTONE) 25 MG tablet, Take 1 tablet by mouth Daily., Disp: 90 tablet, Rfl: 1    Allergies:   No Known Allergies    Objective   Vital Signs:   Vitals:    07/24/23 1426   BP: 102/50   BP Location: Left arm   Patient Position: Sitting   Pulse: 66   SpO2: 94%   Weight: 93 kg (205 lb)   Height: 175.3 cm (69\")       PHYSICAL EXAM  General appearance: Awake, alert, cooperative  Head: Normocephalic, without obvious abnormality, atraumatic  Eyes: Conjunctivae/corneas clear, EOMs intact  Neck: no adenopathy, no carotid bruit, no JVD and thyroid: not enlarged  Lungs: clear to auscultation bilaterally and no rhonchi or crackles\", ' symmetric  Heart: regular rate and rhythm, S1, S2 normal, no murmur, click, rub or gallop  Abdomen: Soft, non-tender, bowel sounds normal,  no organomegaly  Extremities: extremities normal, atraumatic, no cyanosis or edema  Skin: Skin color, turgor normal, no rashes or lesions  Neurologic: Grossly normal     Lab Results   Component Value Date    GLUCOSE 88 07/14/2023    CALCIUM 9.3 07/14/2023     07/14/2023    K 4.4 07/14/2023    CO2 23.9 07/14/2023     07/14/2023    BUN 15 07/14/2023    CREATININE 0.96 07/14/2023    EGFRIFAFRI 100 12/22/2021    EGFRIFNONA 83 12/22/2021    BCR 15.6 07/14/2023    ANIONGAP 11.0 01/26/2023     Lab Results   Component Value Date    WBC 6.50 07/14/2023    HGB 13.8 07/14/2023    HCT 41.1 07/14/2023    MCV 91.1 07/14/2023     07/14/2023     Lab Results   Component " Value Date    INR 0.99 10/11/2017    INR 1.02 08/20/2015    PROTIME 10.7 08/20/2015     Lab Results   Component Value Date    TSH 2.480 05/04/2022       Cardiac Testing:     I personally viewed and interpreted the patient's EKG/Telemetry/lab data      ECG 12 Lead    Date/Time: 7/24/2023 2:57 PM  Performed by: Farhan Colón PA  Authorized by: Farhan Colón PA   Comparison: compared with previous ECG from 7/12/2023  Similar to previous ECG  Rhythm: sinus rhythm  Rate: normal  Conduction: conduction normal  ST Segments: ST segments normal  QRS axis: normal    Clinical impression: normal ECG        Tobacco Cessation: N/A  Obstructive Sleep Apnea Screening: Completed    Assessment & Plan    Diagnoses and all orders for this visit:    1. Primary hypertension (Primary)    2. LBBB (left bundle branch block)    3. ICD (implantable cardioverter-defibrillator), dual, in situ    4. Paroxysmal SVT (supraventricular tachycardia)         Diagnosis Plan   1. Paroxysmal atrial fibrillation (HCC)   status post extensive ablation by Dr. Verdugo January 26, 2023 which includes PVA, right and left atrial flutters SVT.  The patient tolerated procedure well he is quite happy results.  He feels better than he had a long time.  He remains on sotalol therapy.  EKG today stable continue monitor for toxicity.   2. Long term current use of antiarrhythmic drug   sotalol.  Doing well.   3. ICD (implantable cardioverter-defibrillator), dual, in situ   device interrogation bivicd.  DDDR at 60.  A paced 14%.  BiV paced 99%.  1.5 years remaining on generator.  No significant arrhythmias since ablation..      This patient's Cardiac Implanted Electronic Device was manually interrogated and reprogrammed during the patient encounter today.  Iterative programming changes were manually made to determine the sensing threshold, pacing threshold, lead impedance as well as underlying cardiac rhythm.  These programming changes were not limited to but  included some or all of the following when appropriate: pacing mode, programmed AV delays, blanking periods, and refractory periods.  Data obtained as a result of these manual programing changes informed the patient's CIED permanent programming.   4. LBBB (left bundle branch block)   CRT in situ.   5. NICM (nonischemic cardiomyopathy) (HCC)   euvolemic.  Good medical therapy Toprol-XL Entresto.   6. Primary hypertension   blood pressure reasonably well controlled.   7. Chronic systolic congestive heart failure (HCC)   euvolemic.  No further diuresis required.   Stable CV course.   Electronically signed by LIBBY Enriquez, 07/24/23, 2:53 PM EDT.

## 2023-10-17 NOTE — TELEPHONE ENCOUNTER
Patient called and needs cardiac clearance for spinal/disc surgery 7/20/18 by Dr. Galindo.  Needs letter faxed to [phone]   Enbrel Counseling:  I discussed with the patient the risks of etanercept including but not limited to myelosuppression, immunosuppression, autoimmune hepatitis, demyelinating diseases, lymphoma, and infections.  The patient understands that monitoring is required including a PPD at baseline and must alert us or the primary physician if symptoms of infection or other concerning signs are noted.

## 2023-10-25 RX ORDER — FUROSEMIDE 40 MG/1
TABLET ORAL
Qty: 90 TABLET | Refills: 0 | Status: SHIPPED | OUTPATIENT
Start: 2023-10-25

## 2023-11-21 RX ORDER — SOTALOL HYDROCHLORIDE 80 MG/1
TABLET ORAL
Qty: 180 TABLET | Refills: 0 | Status: SHIPPED | OUTPATIENT
Start: 2023-11-21

## 2023-12-11 NOTE — TELEPHONE ENCOUNTER
Caller: TroyArash    Relationship: Self    Best call back number: [phone]    Requested Prescriptions:   Requested Prescriptions     Pending Prescriptions Disp Refills    Eliquis 5 MG tablet tablet [Pharmacy Med Name: Eliquis 5 MG Oral Tablet] 180 tablet 0     Sig: TAKE 1 TABLET BY MOUTH EVERY 12 HOURS        Pharmacy where request should be sent: [facility] [address] [place] [address] [phone] [facility] [phone]      Last office visit with prescribing clinician: 4/24/2023   Last telemedicine visit with prescribing clinician: Visit date not found   Next office visit with prescribing clinician: 1/29/2024     Additional details provided by patient: PT COMPLETELY OUT OF MEDICATION.     Does the patient have less than a 3 day supply:  [x] Yes  [] No    Would you like a call back once the refill request has been completed: [] Yes [x] No    If the office needs to give you a call back, can they leave a voicemail: [] Yes [x] No    Sridevi Kelsey Rep   12/11/23 08:37 EST

## 2023-12-12 RX ORDER — APIXABAN 5 MG/1
TABLET, FILM COATED ORAL
Qty: 180 TABLET | Refills: 0 | Status: SHIPPED | OUTPATIENT
Start: 2023-12-12

## 2023-12-12 NOTE — TELEPHONE ENCOUNTER
Caller: Arash Simmons    Relationship: Self    Best call back number: [phone]    What is the best time to reach you: ANYTIME    Who are you requesting to speak with (clinical staff, provider,  specific staff member): CLINICAL        What was the call regarding: PT IS CALLING IN REGARDS TO HIS REFILL OF ELIQUIS. HE IS COMPLETELY OUT OF THE MEDICATION AND HE IS REQUESTING IT BE CALLED INTO THE PHARMACY. PLEASE REACH OUT TO PT TO LET HIM KNOW THIS IS DONE.     Is it okay if the provider responds through [facility]: CALL

## 2024-01-02 RX ORDER — SOTALOL HYDROCHLORIDE 80 MG/1
TABLET ORAL
Qty: 180 TABLET | Refills: 0 | Status: SHIPPED | OUTPATIENT
Start: 2024-01-02

## 2024-01-19 ENCOUNTER — OFFICE VISIT (OUTPATIENT)
Dept: FAMILY MEDICINE CLINIC | Facility: CLINIC | Age: 64
End: 2024-01-19
Payer: COMMERCIAL

## 2024-01-19 VITALS
TEMPERATURE: 97.8 F | HEART RATE: 68 BPM | OXYGEN SATURATION: 97 % | WEIGHT: 210.4 LBS | DIASTOLIC BLOOD PRESSURE: 70 MMHG | BODY MASS INDEX: 31.16 KG/M2 | RESPIRATION RATE: 16 BRPM | HEIGHT: 69 IN | SYSTOLIC BLOOD PRESSURE: 118 MMHG

## 2024-01-19 DIAGNOSIS — I10 ESSENTIAL HYPERTENSION: ICD-10-CM

## 2024-01-19 DIAGNOSIS — I48.19 ATRIAL FIBRILLATION, PERSISTENT: ICD-10-CM

## 2024-01-19 DIAGNOSIS — I50.22 CHRONIC SYSTOLIC CONGESTIVE HEART FAILURE: Primary | ICD-10-CM

## 2024-01-19 DIAGNOSIS — E78.00 PURE HYPERCHOLESTEROLEMIA: ICD-10-CM

## 2024-01-19 DIAGNOSIS — F41.9 ANXIETY: ICD-10-CM

## 2024-01-19 RX ORDER — SPIRONOLACTONE 25 MG/1
25 TABLET ORAL DAILY
Qty: 90 TABLET | Refills: 1 | Status: SHIPPED | OUTPATIENT
Start: 2024-01-19

## 2024-01-19 RX ORDER — FUROSEMIDE 40 MG/1
40 TABLET ORAL DAILY
Qty: 90 TABLET | Refills: 1 | Status: SHIPPED | OUTPATIENT
Start: 2024-01-19

## 2024-01-19 RX ORDER — SACUBITRIL AND VALSARTAN 24; 26 MG/1; MG/1
1 TABLET, FILM COATED ORAL EVERY 12 HOURS
Qty: 180 TABLET | Refills: 1 | Status: SHIPPED | OUTPATIENT
Start: 2024-01-19

## 2024-01-19 RX ORDER — ATORVASTATIN CALCIUM 10 MG/1
10 TABLET, FILM COATED ORAL NIGHTLY
Qty: 90 TABLET | Refills: 1 | Status: SHIPPED | OUTPATIENT
Start: 2024-01-19

## 2024-01-19 RX ORDER — FLUOXETINE 20 MG/1
40 TABLET, FILM COATED ORAL DAILY
Qty: 180 TABLET | Refills: 1 | Status: SHIPPED | OUTPATIENT
Start: 2024-01-19

## 2024-01-19 NOTE — PROGRESS NOTES
Subjective   Arash Simmons is a 63 y.o. male.     History of Present Illness     Arash Simmons  is here for follow-up of hypertension of several years duration. He is not exercising and is not adherent to a low-salt diet. Patient does not check his blood pressure.   He is compliant with meds.        Arash Simmons returns today for follow up of Hyperlipidemia  Arash indicates his exercise level as irregularly.  Diet: unchanged  Patient is compliant with medications   Any side effects to medications:   chest pain No myalgia No memory change No  Pt is due for labs      Mood is doing well  No issues and overall the prozac is working well        The following portions of the patient's history were reviewed and updated as appropriate: allergies, current medications, past family history, past medical history, past social history, past surgical history, and problem list.    Review of Systems   Constitutional: Negative.    Cardiovascular: Negative.  Negative for chest pain and palpitations.   Psychiatric/Behavioral: Negative.         Objective   Physical Exam  Vitals and nursing note reviewed.   Constitutional:       General: He is not in acute distress.     Appearance: Normal appearance. He is well-developed.   Cardiovascular:      Rate and Rhythm: Normal rate and regular rhythm.      Heart sounds: Normal heart sounds.   Pulmonary:      Effort: Pulmonary effort is normal.      Breath sounds: Normal breath sounds.   Neurological:      Mental Status: He is alert and oriented to person, place, and time.   Psychiatric:         Mood and Affect: Mood normal.         Behavior: Behavior normal.         Thought Content: Thought content normal.         Judgment: Judgment normal.         Assessment & Plan   Diagnoses and all orders for this visit:    1. Chronic systolic congestive heart failure (Primary)  -     sacubitril-valsartan (Entresto) 24-26 MG tablet; Take 1 tablet by mouth Every 12 (Twelve) Hours.  Dispense: 180  tablet; Refill: 1  -     spironolactone (ALDACTONE) 25 MG tablet; Take 1 tablet by mouth Daily.  Dispense: 90 tablet; Refill: 1  -     proBNP  -     furosemide (LASIX) 40 MG tablet; Take 1 tablet by mouth Daily.  Dispense: 90 tablet; Refill: 1    2. Essential hypertension  -     spironolactone (ALDACTONE) 25 MG tablet; Take 1 tablet by mouth Daily.  Dispense: 90 tablet; Refill: 1  -     CBC & Differential  -     Comprehensive Metabolic Panel    3. Pure hypercholesterolemia  -     atorvastatin (LIPITOR) 10 MG tablet; Take 1 tablet by mouth Every Night.  Dispense: 90 tablet; Refill: 1  -     Comprehensive Metabolic Panel  -     Lipid Panel    4. Anxiety  -     FLUoxetine (PROzac) 20 MG tablet; Take 2 tablets by mouth Daily.  Dispense: 180 tablet; Refill: 1    5. Atrial fibrillation, persistent  -     apixaban (Eliquis) 5 MG tablet tablet; Take 1 tablet by mouth Every 12 (Twelve) Hours.  Dispense: 180 tablet; Refill: 1    Other orders  -     Fluzone >6 Months ([id number])    Continue meds and will recheck labs.  Overall pt without cardiac issues.  No change in lipitor and will check lipids  Mood doing well with prozac, will continue this

## 2024-01-20 LAB
ALBUMIN SERPL-MCNC: 4.7 G/DL (ref 3.9–4.9)
ALBUMIN/GLOB SERPL: 2 {RATIO} (ref 1.2–2.2)
ALP SERPL-CCNC: 88 IU/L (ref 44–121)
ALT SERPL-CCNC: 16 IU/L (ref 0–44)
AST SERPL-CCNC: 21 IU/L (ref 0–40)
BASOPHILS # BLD AUTO: 0 X10E3/UL (ref 0–0.2)
BASOPHILS NFR BLD AUTO: 0 %
BILIRUB SERPL-MCNC: 0.8 MG/DL (ref 0–1.2)
BUN SERPL-MCNC: 17 MG/DL (ref 8–27)
BUN/CREAT SERPL: 18 (ref 10–24)
CALCIUM SERPL-MCNC: 9.2 MG/DL (ref 8.6–10.2)
CHLORIDE SERPL-SCNC: 103 MMOL/L (ref 96–106)
CHOLEST SERPL-MCNC: 162 MG/DL (ref 100–199)
CO2 SERPL-SCNC: 25 MMOL/L (ref 20–29)
CREAT SERPL-MCNC: 0.95 MG/DL (ref 0.76–1.27)
EGFRCR SERPLBLD CKD-EPI 2021: 90 ML/MIN/1.73
EOSINOPHIL # BLD AUTO: 0.1 X10E3/UL (ref 0–0.4)
EOSINOPHIL NFR BLD AUTO: 2 %
ERYTHROCYTE [DISTWIDTH] IN BLOOD BY AUTOMATED COUNT: 12.2 % (ref 11.6–15.4)
GLOBULIN SER CALC-MCNC: 2.4 G/DL (ref 1.5–4.5)
GLUCOSE SERPL-MCNC: 109 MG/DL (ref 70–99)
HCT VFR BLD AUTO: 40.4 % (ref 37.5–51)
HDLC SERPL-MCNC: 47 MG/DL
HGB BLD-MCNC: 14.1 G/DL (ref 13–17.7)
IMM GRANULOCYTES # BLD AUTO: 0 X10E3/UL (ref 0–0.1)
IMM GRANULOCYTES NFR BLD AUTO: 0 %
LDLC SERPL CALC-MCNC: 103 MG/DL (ref 0–99)
LYMPHOCYTES # BLD AUTO: 1.5 X10E3/UL (ref 0.7–3.1)
LYMPHOCYTES NFR BLD AUTO: 21 %
MCH RBC QN AUTO: 31.6 PG (ref 26.6–33)
MCHC RBC AUTO-ENTMCNC: 34.9 G/DL (ref 31.5–35.7)
MCV RBC AUTO: 91 FL (ref 79–97)
MONOCYTES # BLD AUTO: 0.6 X10E3/UL (ref 0.1–0.9)
MONOCYTES NFR BLD AUTO: 8 %
NEUTROPHILS # BLD AUTO: 4.9 X10E3/UL (ref 1.4–7)
NEUTROPHILS NFR BLD AUTO: 69 %
NT-PROBNP SERPL-MCNC: 285 PG/ML (ref 0–210)
PLATELET # BLD AUTO: 260 X10E3/UL (ref 150–450)
POTASSIUM SERPL-SCNC: 4.5 MMOL/L (ref 3.5–5.2)
PROT SERPL-MCNC: 7.1 G/DL (ref 6–8.5)
RBC # BLD AUTO: 4.46 X10E6/UL (ref 4.14–5.8)
SODIUM SERPL-SCNC: 140 MMOL/L (ref 134–144)
TRIGL SERPL-MCNC: 58 MG/DL (ref 0–149)
VLDLC SERPL CALC-MCNC: 12 MG/DL (ref 5–40)
WBC # BLD AUTO: 7 X10E3/UL (ref 3.4–10.8)

## 2024-01-29 ENCOUNTER — OFFICE VISIT (OUTPATIENT)
Dept: CARDIOLOGY | Facility: CLINIC | Age: 64
End: 2024-01-29
Payer: COMMERCIAL

## 2024-01-29 VITALS
WEIGHT: 209 LBS | SYSTOLIC BLOOD PRESSURE: 124 MMHG | DIASTOLIC BLOOD PRESSURE: 58 MMHG | BODY MASS INDEX: 29.92 KG/M2 | HEIGHT: 70 IN | HEART RATE: 68 BPM | OXYGEN SATURATION: 98 %

## 2024-01-29 DIAGNOSIS — Z79.01 CHRONIC ANTICOAGULATION: ICD-10-CM

## 2024-01-29 DIAGNOSIS — Z79.899 LONG TERM CURRENT USE OF ANTIARRHYTHMIC DRUG: ICD-10-CM

## 2024-01-29 DIAGNOSIS — I10 PRIMARY HYPERTENSION: ICD-10-CM

## 2024-01-29 DIAGNOSIS — I48.91 ATRIAL FIBRILLATION AND FLUTTER: Primary | ICD-10-CM

## 2024-01-29 DIAGNOSIS — Z95.810 ICD (IMPLANTABLE CARDIOVERTER-DEFIBRILLATOR), DUAL, IN SITU: ICD-10-CM

## 2024-01-29 DIAGNOSIS — I42.8 NICM (NONISCHEMIC CARDIOMYOPATHY): ICD-10-CM

## 2024-01-29 DIAGNOSIS — I48.92 ATRIAL FIBRILLATION AND FLUTTER: Primary | ICD-10-CM

## 2024-01-29 RX ORDER — SOTALOL HYDROCHLORIDE 80 MG/1
TABLET ORAL
Qty: 180 TABLET | Refills: 3 | Status: SHIPPED | OUTPATIENT
Start: 2024-01-29

## 2024-01-29 NOTE — LETTER
January 29, 2024     Sundeep Stone MD  [address]  [place]  [facility] [address]    Patient: Arash Simmons   YOB: 1960   Date of Visit: 1/29/2024     Dear Sundeep Stone MD:       Thank you for referring Arash Simmons to me for evaluation. Below are the relevant portions of my assessment and plan of care.    If you have questions, please do not hesitate to call me. I look forward to following Arash along with you.         Sincerely,        Sal Verdugo, DO        CC: No Recipients    Farhan Blackwood PA  01/29/24 1458  Sign when Signing Visit          Encounter Date:01/29/2024      Patient ID: Arash Simmons is a 63 y.o. male.    Sundeep Stone MD    Cheif Complaint EP: Atrial Fibrillation, Nonischemic cardiomyopathy, and ICD Check    PROBLEM LIST:  Patient Active Problem List    Diagnosis Date Noted   • Atrial fibrillation and flutter 01/26/2023     Priority: High     Note Last Updated: 1/29/2024     Catheter ablation of persistent atrial fibrillation with adjunctive ablation of the left atrial posterior wall and the base left atrial appendage for persistent atrial fibrillation. Catheter ablation of SVT #1 a clockwise PAYAM mitral flutter.  Cath ablation of SVT #2 counterclockwise typical right atrial flutter. (1/26/2023)     • Paroxysmal SVT (supraventricular tachycardia) 09/16/2020     Priority: High     Note Last Updated: 1/20/2023     Atrial tachycardia     • ICD (implantable cardioverter-defibrillator), dual, in situ 09/19/2018     Priority: High     Note Last Updated: 3/21/2022     ICD shock 7/3/2020 due to SVT/atrial tachycardia at 220 bpm  ICD shock 8/21 due to atrial tachycardia with one-to-one conduction.  (Not A. Fib)  AT, Sotalol added.     • NICM (nonischemic cardiomyopathy) 06/07/2016     Priority: High     Note Last Updated: 3/21/2022     On 04/07/2015, abnormal myocardial perfusion study with evidence of dilated cardiomyopathy, ejection fraction of 16%, large fixed  perfusion defect in the anterior apex, consistent with prior myocardial infarction.   On 05/08/2015, cardiac catheterization  with EF of 10% to 15%.  Normal coronary arteries.  Severe left ventricular dilatation.    Echo, 07/08/2015, LVEF 20%.   Status post biventricular ICD placement, August 2015.  9/2020 echo EF 25-30% mild AR. Mild to mod MR.      • Premature ventricular contractions 07/15/2016     Priority: Medium     Note Last Updated: 1/20/2023            • Chronic systolic congestive heart failure 06/07/2016     Priority: Medium   • Chronic anticoagulation 01/29/2024     Priority: Low   • Long term current use of antiarrhythmic drug      Priority: Low   • LBBB (left bundle branch block) 07/15/2016     Priority: Low   • Hypertension 06/07/2016     Priority: Low   • Hyperlipidemia 06/07/2016     Priority: Low   • Arthritis    • Bilateral carpal tunnel syndrome 01/26/2018   • Osteoarthritis of knee 03/21/2017   • Anxiety 06/07/2016               History of Present Illness  Patient presents today for follow-up with a history of atrial fibrillation and flutter status post extensive ablation on long-term antiarrhythmic and chronic anticoagulation as well as nonischemic cardiomyopathy with a resynchronization ICD.  He returns today for scheduled electrophysiology follow-up.  He is doing very well.  He is working regularly.  He has no complaint of palpitations, no dizziness no syncope.  No current exertional dyspnea, orthopnea, PND or lower extremity edema.  His blood pressure at home typically runs about 110 mmHg systolic and he states compliance with his current medical regimen reports no significant adverse side effects.    No Known Allergies    Current Outpatient Medications   Medication Instructions   • apixaban (ELIQUIS) 5 mg, Oral, Every 12 Hours   • atorvastatin (LIPITOR) 10 mg, Oral, Nightly   • FLUoxetine (PROZAC) 40 mg, Oral, Daily   • furosemide (LASIX) 40 mg, Oral, Daily   • metoprolol succinate XL  "(TOPROL-XL) 75 mg, Oral, Daily, May take extra half table PRN tachycardia   • sacubitril-valsartan (Entresto) 24-26 MG tablet 1 tablet, Oral, Every 12 Hours   • sotalol (BETAPACE AF) 80 MG tablet tablet Take 2 tablets by mouth twice daily   • spironolactone (ALDACTONE) 25 mg, Oral, Daily       .    Objective:     /58 (BP Location: Left arm, Patient Position: Sitting)   Pulse 68   Ht 177.8 cm (70\")   Wt 94.8 kg (209 lb)   SpO2 98%   BMI 29.99 kg/m²    Body mass index is 29.99 kg/m².     Constitutional:       Appearance: Well-developed.   Pulmonary:      Effort: Pulmonary effort is normal. No respiratory distress.      Breath sounds: Normal breath sounds. No wheezing. No rales.      Comments: Bases clear  Chest:      Chest wall: Not tender to palpatation.   Cardiovascular:      Normal rate. Regular rhythm.      Murmurs: There is no murmur.      No gallop.  No click. No rub.   Pulses:     Intact distal pulses.   Edema:     Peripheral edema absent.   Musculoskeletal: Normal range of motion.       Lab Review:     Lab Results   Component Value Date    GLUCOSE 109 (H) 01/19/2024    BUN 17 01/19/2024    CREATININE 0.95 01/19/2024    EGFRRESULT 90 01/19/2024    EGFR 82.6 01/26/2023    BCR 18 01/19/2024    K 4.5 01/19/2024    CO2 25 01/19/2024    CALCIUM 9.2 01/19/2024    PROTENTOTREF 7.1 01/19/2024    ALBUMIN 4.7 01/19/2024    BILITOT 0.8 01/19/2024    AST 21 01/19/2024    ALT 16 01/19/2024     Lab Results   Component Value Date    WBC 7.0 01/19/2024    HGB 14.1 01/19/2024    HCT 40.4 01/19/2024    MCV 91 01/19/2024     01/19/2024     Lab Results   Component Value Date    TSH 2.480 05/04/2022           Procedures               Assessment:      Diagnosis Plan   1. Atrial fibrillation and flutter  No events on device interrogation      2. NICM (nonischemic cardiomyopathy)  Stable nonischemic cardiomyopathy, continue GDMT      3. ICD (implantable cardioverter-defibrillator), dual, in situ    This patient's " Cardiac Implanted Electronic Device was manually interrogated and reprogrammed during the patient encounter today.  Iterative programming changes were manually made to determine the sensing threshold, pacing threshold, lead impedance as well as underlying cardiac rhythm.  These programming changes were not limited to but included some or all of the following when appropriate: pacing mode, programmed AV delays, blanking periods, and refractory periods.  Data obtained as a result of these manual programing changes informed the patient's CIED permanent programming.    11% right atrial pacing and 99% RV/LV pacing.  Normal lead parameters 1 year battery life remaining      4. Primary hypertension  Well-managed on current medical regimen      5. Long term current use of antiarrhythmic drug  Stable EKG, continue sotalol.  Refilled sotalol at current dose   6.      Chronic anticoagulation                                          tolerating anticoagulation, continue Eliquis 5 mg twice daily     Plan:     Stable cardiac status.  Continue current medications.   in 6 months, sooner as needed.  Thank you for allowing us to participate in the care of your patient.     Electronically signed by LIBBY Melendez, 01/29/24, 2:58 PM EST.

## 2024-01-29 NOTE — PROGRESS NOTES
Encounter Date:01/29/2024      Patient ID: Arash Simmons is a 63 y.o. male.    Sundeep Stone MD    Cheif Complaint EP: Atrial Fibrillation, Nonischemic cardiomyopathy, and ICD Check    PROBLEM LIST:  Patient Active Problem List    Diagnosis Date Noted    Atrial fibrillation and flutter 01/26/2023     Priority: High     Note Last Updated: 1/29/2024     Catheter ablation of persistent atrial fibrillation with adjunctive ablation of the left atrial posterior wall and the base left atrial appendage for persistent atrial fibrillation. Catheter ablation of SVT #1 a clockwise PAYAM mitral flutter.  Cath ablation of SVT #2 counterclockwise typical right atrial flutter. (1/26/2023)      Paroxysmal SVT (supraventricular tachycardia) 09/16/2020     Priority: High     Note Last Updated: 1/20/2023     Atrial tachycardia      ICD (implantable cardioverter-defibrillator), dual, in situ 09/19/2018     Priority: High     Note Last Updated: 3/21/2022     ICD shock 7/3/2020 due to SVT/atrial tachycardia at 220 bpm  ICD shock 8/21 due to atrial tachycardia with one-to-one conduction.  (Not A. Fib)  AT, Sotalol added.      NICM (nonischemic cardiomyopathy) 06/07/2016     Priority: High     Note Last Updated: 3/21/2022     On 04/07/2015, abnormal myocardial perfusion study with evidence of dilated cardiomyopathy, ejection fraction of 16%, large fixed perfusion defect in the anterior apex, consistent with prior myocardial infarction.   On 05/08/2015, cardiac catheterization  with EF of 10% to 15%.  Normal coronary arteries.  Severe left ventricular dilatation.    Echo, 07/08/2015, LVEF 20%.   Status post biventricular ICD placement, August 2015.  9/2020 echo EF 25-30% mild AR. Mild to mod MR.       Premature ventricular contractions 07/15/2016     Priority: Medium     Note Last Updated: 1/20/2023             Chronic systolic congestive heart failure 06/07/2016     Priority: Medium    Chronic anticoagulation 01/29/2024      "Priority: Low    Long term current use of antiarrhythmic drug      Priority: Low    LBBB (left bundle branch block) 07/15/2016     Priority: Low    Hypertension 06/07/2016     Priority: Low    Hyperlipidemia 06/07/2016     Priority: Low    Arthritis     Bilateral carpal tunnel syndrome 01/26/2018    Osteoarthritis of knee 03/21/2017    Anxiety 06/07/2016               History of Present Illness  Patient presents today for follow-up with a history of atrial fibrillation and flutter status post extensive ablation on long-term antiarrhythmic and chronic anticoagulation as well as nonischemic cardiomyopathy with a resynchronization ICD.  He returns today for scheduled electrophysiology follow-up.  He is doing very well.  He is working regularly.  He has no complaint of palpitations, no dizziness no syncope.  No current exertional dyspnea, orthopnea, PND or lower extremity edema.  His blood pressure at home typically runs about 110 mmHg systolic and he states compliance with his current medical regimen reports no significant adverse side effects.    No Known Allergies    Current Outpatient Medications   Medication Instructions    apixaban (ELIQUIS) 5 mg, Oral, Every 12 Hours    atorvastatin (LIPITOR) 10 mg, Oral, Nightly    FLUoxetine (PROZAC) 40 mg, Oral, Daily    furosemide (LASIX) 40 mg, Oral, Daily    metoprolol succinate XL (TOPROL-XL) 75 mg, Oral, Daily, May take extra half table PRN tachycardia    sacubitril-valsartan (Entresto) 24-26 MG tablet 1 tablet, Oral, Every 12 Hours    sotalol (BETAPACE AF) 80 MG tablet tablet Take 2 tablets by mouth twice daily    spironolactone (ALDACTONE) 25 mg, Oral, Daily       .    Objective:     /58 (BP Location: Left arm, Patient Position: Sitting)   Pulse 68   Ht 177.8 cm (70\")   Wt 94.8 kg (209 lb)   SpO2 98%   BMI 29.99 kg/m²    Body mass index is 29.99 kg/m².     Constitutional:       Appearance: Well-developed.   Pulmonary:      Effort: Pulmonary effort is normal. " No respiratory distress.      Breath sounds: Normal breath sounds. No wheezing. No rales.      Comments: Bases clear  Chest:      Chest wall: Not tender to palpatation.   Cardiovascular:      Normal rate. Regular rhythm.      Murmurs: There is no murmur.      No gallop.  No click. No rub.   Pulses:     Intact distal pulses.   Edema:     Peripheral edema absent.   Musculoskeletal: Normal range of motion.       Lab Review:     Lab Results   Component Value Date    GLUCOSE 109 (H) 01/19/2024    BUN 17 01/19/2024    CREATININE 0.95 01/19/2024    EGFRRESULT 90 01/19/2024    EGFR 82.6 01/26/2023    BCR 18 01/19/2024    K 4.5 01/19/2024    CO2 25 01/19/2024    CALCIUM 9.2 01/19/2024    PROTENTOTREF 7.1 01/19/2024    ALBUMIN 4.7 01/19/2024    BILITOT 0.8 01/19/2024    AST 21 01/19/2024    ALT 16 01/19/2024     Lab Results   Component Value Date    WBC 7.0 01/19/2024    HGB 14.1 01/19/2024    HCT 40.4 01/19/2024    MCV 91 01/19/2024     01/19/2024     Lab Results   Component Value Date    TSH 2.480 05/04/2022           Procedures               Assessment:      Diagnosis Plan   1. Atrial fibrillation and flutter  No events on device interrogation      2. NICM (nonischemic cardiomyopathy)  Stable nonischemic cardiomyopathy, continue GDMT      3. ICD (implantable cardioverter-defibrillator), dual, in situ    This patient's Cardiac Implanted Electronic Device was manually interrogated and reprogrammed during the patient encounter today.  Iterative programming changes were manually made to determine the sensing threshold, pacing threshold, lead impedance as well as underlying cardiac rhythm.  These programming changes were not limited to but included some or all of the following when appropriate: pacing mode, programmed AV delays, blanking periods, and refractory periods.  Data obtained as a result of these manual programing changes informed the patient's CIED permanent programming.    11% right atrial pacing and 99% RV/LV  pacing.  Normal lead parameters 1 year battery life remaining      4. Primary hypertension  Well-managed on current medical regimen      5. Long term current use of antiarrhythmic drug  Stable EKG, continue sotalol.  Refilled sotalol at current dose   6.      Chronic anticoagulation                                          tolerating anticoagulation, continue Eliquis 5 mg twice daily     Plan:     Stable cardiac status.  Continue current medications.   in 6 months, sooner as needed.  Thank you for allowing us to participate in the care of your patient.     Electronically signed by LIBBY Melendez, 01/29/24, 2:58 PM EST.

## 2024-05-02 RX ORDER — METOPROLOL SUCCINATE 50 MG/1
TABLET, EXTENDED RELEASE ORAL
Qty: 60 TABLET | Refills: 6 | Status: SHIPPED | OUTPATIENT
Start: 2024-05-02

## 2024-07-13 DIAGNOSIS — E78.00 PURE HYPERCHOLESTEROLEMIA: ICD-10-CM

## 2024-07-13 DIAGNOSIS — I50.22 CHRONIC SYSTOLIC CONGESTIVE HEART FAILURE: ICD-10-CM

## 2024-07-15 RX ORDER — FUROSEMIDE 40 MG/1
40 TABLET ORAL DAILY
Qty: 90 TABLET | Refills: 0 | Status: SHIPPED | OUTPATIENT
Start: 2024-07-15 | End: 2024-07-19 | Stop reason: SDUPTHER

## 2024-07-15 RX ORDER — SACUBITRIL AND VALSARTAN 24; 26 MG/1; MG/1
1 TABLET, FILM COATED ORAL EVERY 12 HOURS
Qty: 180 TABLET | Refills: 0 | Status: SHIPPED | OUTPATIENT
Start: 2024-07-15 | End: 2024-07-19 | Stop reason: SDUPTHER

## 2024-07-15 RX ORDER — ATORVASTATIN CALCIUM 10 MG/1
10 TABLET, FILM COATED ORAL NIGHTLY
Qty: 90 TABLET | Refills: 0 | Status: SHIPPED | OUTPATIENT
Start: 2024-07-15 | End: 2024-07-19 | Stop reason: SDUPTHER

## 2024-07-19 ENCOUNTER — OFFICE VISIT (OUTPATIENT)
Dept: FAMILY MEDICINE CLINIC | Facility: CLINIC | Age: 64
End: 2024-07-19
Payer: COMMERCIAL

## 2024-07-19 ENCOUNTER — TELEPHONE (OUTPATIENT)
Dept: FAMILY MEDICINE CLINIC | Facility: CLINIC | Age: 64
End: 2024-07-19

## 2024-07-19 VITALS
TEMPERATURE: 97.8 F | SYSTOLIC BLOOD PRESSURE: 122 MMHG | HEIGHT: 70 IN | BODY MASS INDEX: 29.49 KG/M2 | WEIGHT: 206 LBS | RESPIRATION RATE: 16 BRPM | HEART RATE: 67 BPM | DIASTOLIC BLOOD PRESSURE: 60 MMHG

## 2024-07-19 DIAGNOSIS — F41.9 ANXIETY: ICD-10-CM

## 2024-07-19 DIAGNOSIS — I48.19 ATRIAL FIBRILLATION, PERSISTENT: ICD-10-CM

## 2024-07-19 DIAGNOSIS — I10 ESSENTIAL HYPERTENSION: ICD-10-CM

## 2024-07-19 DIAGNOSIS — I50.22 CHRONIC SYSTOLIC CONGESTIVE HEART FAILURE: ICD-10-CM

## 2024-07-19 DIAGNOSIS — E78.00 PURE HYPERCHOLESTEROLEMIA: ICD-10-CM

## 2024-07-19 PROCEDURE — 99214 OFFICE O/P EST MOD 30 MIN: CPT | Performed by: FAMILY MEDICINE

## 2024-07-19 RX ORDER — FLUOXETINE 20 MG/1
40 TABLET, FILM COATED ORAL DAILY
Qty: 180 TABLET | Refills: 1 | Status: SHIPPED | OUTPATIENT
Start: 2024-07-19

## 2024-07-19 RX ORDER — ATORVASTATIN CALCIUM 10 MG/1
10 TABLET, FILM COATED ORAL NIGHTLY
Qty: 90 TABLET | Refills: 1 | Status: SHIPPED | OUTPATIENT
Start: 2024-07-19

## 2024-07-19 RX ORDER — SACUBITRIL AND VALSARTAN 24; 26 MG/1; MG/1
1 TABLET, FILM COATED ORAL EVERY 12 HOURS
Qty: 180 TABLET | Refills: 1 | Status: SHIPPED | OUTPATIENT
Start: 2024-07-19

## 2024-07-19 RX ORDER — SPIRONOLACTONE 25 MG/1
25 TABLET ORAL DAILY
Qty: 90 TABLET | Refills: 1 | Status: SHIPPED | OUTPATIENT
Start: 2024-07-19

## 2024-07-19 RX ORDER — FUROSEMIDE 40 MG/1
40 TABLET ORAL DAILY
Qty: 90 TABLET | Refills: 0 | Status: SHIPPED | OUTPATIENT
Start: 2024-07-19

## 2024-07-19 NOTE — PROGRESS NOTES
Subjective   Arash Simmons is a 64 y.o. male.     Hypertension    Hyperlipidemia         Arash Simmons  is here for follow-up of hypertension of several years duration. He is not exercising and is not adherent to a low-salt diet. Patient does not check his blood pressure.  He is compliant with meds.        Arash Simmons returns today for follow up of Hyperlipidemia  Arash indicates his exercise level as irregularly.  Diet: unchanged  Patient is compliant with medications   Any side effects to medications:   chest pain No myalgia No memory change No  Pt is due for labs      Foot pain is major issue  He would like to see foot specialist in Jan 25 when hev has further health issues address like his pacemaker!    The following portions of the patient's history were reviewed and updated as appropriate: allergies, current medications, past family history, past medical history, past social history, past surgical history, and problem list.    Review of Systems   Constitutional: Negative.    Respiratory: Negative.     Cardiovascular: Negative.    Psychiatric/Behavioral: Negative.         Objective   Physical Exam  Vitals and nursing note reviewed.   Constitutional:       General: He is not in acute distress.     Appearance: Normal appearance. He is well-developed.   Cardiovascular:      Rate and Rhythm: Normal rate and regular rhythm.      Heart sounds: Normal heart sounds.   Pulmonary:      Effort: Pulmonary effort is normal.      Breath sounds: Normal breath sounds.   Neurological:      Mental Status: He is alert and oriented to person, place, and time.   Psychiatric:         Mood and Affect: Mood normal.         Behavior: Behavior normal.         Thought Content: Thought content normal.         Judgment: Judgment normal.         Assessment & Plan   Diagnoses and all orders for this visit:    1. Chronic systolic congestive heart failure  -     spironolactone (ALDACTONE) 25 MG tablet; Take 1 tablet by mouth  Daily.  Dispense: 90 tablet; Refill: 1  -     sacubitril-valsartan (Entresto) 24-26 MG tablet; Take 1 tablet by mouth Every 12 (Twelve) Hours.  Dispense: 180 tablet; Refill: 1  -     furosemide (LASIX) 40 MG tablet; Take 1 tablet by mouth Daily.  Dispense: 90 tablet; Refill: 0    2. Essential hypertension  -     spironolactone (ALDACTONE) 25 MG tablet; Take 1 tablet by mouth Daily.  Dispense: 90 tablet; Refill: 1    3. Atrial fibrillation, persistent  -     apixaban (Eliquis) 5 MG tablet tablet; Take 1 tablet by mouth Every 12 (Twelve) Hours.  Dispense: 180 tablet; Refill: 1    4. Pure hypercholesterolemia  -     atorvastatin (LIPITOR) 10 MG tablet; Take 1 tablet by mouth Every Night.  Dispense: 90 tablet; Refill: 1    5. Anxiety  -     FLUoxetine (PROzac) 20 MG tablet; Take 2 tablets by mouth Daily.  Dispense: 180 tablet; Refill: 1      BP doing well, no change in regimen    Will recheck lipids and continue tatin    Mood stable, no change in prozac.    Plan to see Dr. Briggs in future for goot pain.

## 2024-07-20 LAB
ALBUMIN SERPL-MCNC: 4.3 G/DL (ref 3.9–4.9)
ALP SERPL-CCNC: 75 IU/L (ref 44–121)
ALT SERPL-CCNC: 11 IU/L (ref 0–44)
AST SERPL-CCNC: 17 IU/L (ref 0–40)
BASOPHILS # BLD AUTO: 0.1 X10E3/UL (ref 0–0.2)
BASOPHILS NFR BLD AUTO: 1 %
BILIRUB SERPL-MCNC: 0.8 MG/DL (ref 0–1.2)
BUN SERPL-MCNC: 17 MG/DL (ref 8–27)
BUN/CREAT SERPL: 19 (ref 10–24)
CALCIUM SERPL-MCNC: 9.1 MG/DL (ref 8.6–10.2)
CHLORIDE SERPL-SCNC: 105 MMOL/L (ref 96–106)
CHOLEST SERPL-MCNC: 149 MG/DL (ref 100–199)
CO2 SERPL-SCNC: 22 MMOL/L (ref 20–29)
CREAT SERPL-MCNC: 0.88 MG/DL (ref 0.76–1.27)
EGFRCR SERPLBLD CKD-EPI 2021: 97 ML/MIN/1.73
EOSINOPHIL # BLD AUTO: 0.2 X10E3/UL (ref 0–0.4)
EOSINOPHIL NFR BLD AUTO: 3 %
ERYTHROCYTE [DISTWIDTH] IN BLOOD BY AUTOMATED COUNT: 12.5 % (ref 11.6–15.4)
GLOBULIN SER CALC-MCNC: 2.1 G/DL (ref 1.5–4.5)
GLUCOSE SERPL-MCNC: 101 MG/DL (ref 70–99)
HCT VFR BLD AUTO: 37.4 % (ref 37.5–51)
HDLC SERPL-MCNC: 45 MG/DL
HGB BLD-MCNC: 12.6 G/DL (ref 13–17.7)
IMM GRANULOCYTES # BLD AUTO: 0 X10E3/UL (ref 0–0.1)
IMM GRANULOCYTES NFR BLD AUTO: 0 %
LDLC SERPL CALC-MCNC: 90 MG/DL (ref 0–99)
LYMPHOCYTES # BLD AUTO: 1.5 X10E3/UL (ref 0.7–3.1)
LYMPHOCYTES NFR BLD AUTO: 29 %
MCH RBC QN AUTO: 30.9 PG (ref 26.6–33)
MCHC RBC AUTO-ENTMCNC: 33.7 G/DL (ref 31.5–35.7)
MCV RBC AUTO: 92 FL (ref 79–97)
MONOCYTES # BLD AUTO: 0.6 X10E3/UL (ref 0.1–0.9)
MONOCYTES NFR BLD AUTO: 11 %
NEUTROPHILS # BLD AUTO: 2.9 X10E3/UL (ref 1.4–7)
NEUTROPHILS NFR BLD AUTO: 56 %
NT-PROBNP SERPL-MCNC: 393 PG/ML (ref 0–210)
PLATELET # BLD AUTO: 224 X10E3/UL (ref 150–450)
POTASSIUM SERPL-SCNC: 4.6 MMOL/L (ref 3.5–5.2)
PROT SERPL-MCNC: 6.4 G/DL (ref 6–8.5)
PSA SERPL-MCNC: 1.3 NG/ML (ref 0–4)
RBC # BLD AUTO: 4.08 X10E6/UL (ref 4.14–5.8)
SODIUM SERPL-SCNC: 140 MMOL/L (ref 134–144)
TRIGL SERPL-MCNC: 70 MG/DL (ref 0–149)
VLDLC SERPL CALC-MCNC: 14 MG/DL (ref 5–40)
WBC # BLD AUTO: 5.3 X10E3/UL (ref 3.4–10.8)

## 2024-07-22 NOTE — PROGRESS NOTES
Subjective:     Encounter Date:07/24/2024    Primary Care Physician: Sundeep Stone MD      Patient ID: Arash Simmons is a 64 y.o. male.    Chief Complaint:Cardiomyopathy    PROBLEM LIST:  Nonischemic cardiomyopathy:   On 04/07/2015, abnormal myocardial perfusion study with evidence of dilated cardiomyopathy, ejection fraction of 16%, large fixed perfusion defect in the anterior apex, consistent with prior myocardial infarction.   On 05/08/2015, cardiac catheterization  with EF of 10% to 15%.  Normal coronary arteries.  Severe left ventricular dilatation.    Echo, 07/08/2015, LVEF 20%.   Status post biventricular ICD placement, August 2015.  9/2020 echo EF 25-30% mild AR. Mild to mod MR. Read by Dr. Orozco.  Biventricular ICD, Starford Samares, 8/2015  ICD shock 7/3/2020 due to SVT/atrial tachycardia at 220 bpm  ICD shock 8/21 due to atrial tachycardia with one-to-one conduction.  (Not A. fib)  Atrial tahcycardia/fib  PVA Dr. Verdugo 5/5/2022.  Recurrent syncope due to atrial tachycardia with one-to-one conduction 11/22  Status post pulmonary vein ablation, 1/26/2023, Sal Verdugo MD  Left bundle branch block.   Hypertension.   Dyslipidemia.   Anxiety.   Cervical spine  Questionable sleep apnea.   Left knee replacement 2017  Abdominal/intestinal surgery as a child.  Elbow surgery  Tonsillectomy and adenoidectomy  Left knee replacement        No Known Allergies      Current Outpatient Medications:     apixaban (Eliquis) 5 MG tablet tablet, Take 1 tablet by mouth Every 12 (Twelve) Hours., Disp: 180 tablet, Rfl: 1    atorvastatin (LIPITOR) 10 MG tablet, Take 1 tablet by mouth Every Night., Disp: 90 tablet, Rfl: 1    FLUoxetine (PROzac) 20 MG tablet, Take 2 tablets by mouth Daily., Disp: 180 tablet, Rfl: 1    furosemide (LASIX) 40 MG tablet, Take 1 tablet by mouth Daily., Disp: 90 tablet, Rfl: 0    metoprolol succinate XL (TOPROL-XL) 50 MG 24 hr tablet, TAKE 1 & 1/2 (ONE & ONE-HALF) TABLETS BY MOUTH ONCE DAILY, MAY  "TAKE EXTRA HALF TABLET AS NEEDED FOR TACHYCARDIA, Disp: 60 tablet, Rfl: 6    sacubitril-valsartan (Entresto) 24-26 MG tablet, Take 1 tablet by mouth Every 12 (Twelve) Hours., Disp: 180 tablet, Rfl: 1    sotalol (BETAPACE AF) 80 MG tablet tablet, Take 2 tablets by mouth twice daily, Disp: 180 tablet, Rfl: 3    spironolactone (ALDACTONE) 25 MG tablet, Take 1 tablet by mouth Daily., Disp: 90 tablet, Rfl: 1        History of Present Illness    Patient is a 64-year-old  male who presents today for annual follow-up of nonischemic cardiomyopathy.  Since last being seen patient notes overall doing well.  Notes that he is getting close to needing a generator change.  Continues to follow with electrophysiology.  He denies any chest pain, pressure, tightness.  He endorses chronic shortness of breath that is overall unchanged.  No reported syncope, presyncope.  Overall feels at his baseline.  Compliant with his medications.    The following portions of the patient's history were reviewed and updated as appropriate: allergies, current medications, past family history, past medical history, past social history, past surgical history and problem list.      Social History     Tobacco Use    Smoking status: Former     Current packs/day: 0.00     Average packs/day: 2.0 packs/day for 30.0 years (60.0 ttl pk-yrs)     Types: Cigarettes     Start date: 1969     Quit date: 1999     Years since quittin.0     Passive exposure: Past    Smokeless tobacco: Never   Vaping Use    Vaping status: Never Used   Substance Use Topics    Alcohol use: Yes     Alcohol/week: 14.0 standard drinks of alcohol     Types: 14 Cans of beer per week     Comment: 2 beers per day    Drug use: Never         ROS       Objective:   /68   Pulse 60   Ht 177.8 cm (70\")   Wt 93.2 kg (205 lb 6.4 oz)   SpO2 96%   BMI 29.47 kg/m²         Vitals reviewed.   Constitutional:       Appearance: Well-developed and not in distress.   Neck:      " Vascular: No JVD.      Trachea: No tracheal deviation.   Pulmonary:      Effort: Pulmonary effort is normal.      Breath sounds: Normal breath sounds.   Cardiovascular:      Normal rate. Regular rhythm.      Murmurs: There is no murmur.   Edema:     Peripheral edema absent.   Musculoskeletal:         General: No deformity. Skin:     General: Skin is warm and dry.   Neurological:      Mental Status: Alert and oriented to person, place, and time.         Procedures          Assessment:   Assessment & Plan      Diagnoses and all orders for this visit:    1. NICM (nonischemic cardiomyopathy) (Primary), stable.  No evidence of heart failure.  On Entresto.    2. Primary hypertension, well-controlled.  On beta-blocker.    3. Dyslipidemia, stable.  Last LDL less than 100.  On statin.      Plan:  Patient is overall stable from cardiac standpoint.  Continue current cardiac medications.  Continue to follow with electrophysiology.  Follow-up in 1 years time with an echocardiogram or sooner if needed.       TONY Israel         Dictated utilizing Dragon dictation

## 2024-07-24 ENCOUNTER — OFFICE VISIT (OUTPATIENT)
Dept: CARDIOLOGY | Facility: CLINIC | Age: 64
End: 2024-07-24
Payer: COMMERCIAL

## 2024-07-24 VITALS
OXYGEN SATURATION: 96 % | DIASTOLIC BLOOD PRESSURE: 68 MMHG | HEART RATE: 60 BPM | HEIGHT: 70 IN | WEIGHT: 205.4 LBS | BODY MASS INDEX: 29.41 KG/M2 | SYSTOLIC BLOOD PRESSURE: 106 MMHG

## 2024-07-24 DIAGNOSIS — I50.22 CHRONIC SYSTOLIC CONGESTIVE HEART FAILURE: Primary | ICD-10-CM

## 2024-07-24 DIAGNOSIS — E78.5 DYSLIPIDEMIA: ICD-10-CM

## 2024-07-24 DIAGNOSIS — I42.8 NICM (NONISCHEMIC CARDIOMYOPATHY): Primary | ICD-10-CM

## 2024-07-24 DIAGNOSIS — I42.8 NICM (NONISCHEMIC CARDIOMYOPATHY): ICD-10-CM

## 2024-07-24 DIAGNOSIS — I48.91 ATRIAL FIBRILLATION AND FLUTTER: ICD-10-CM

## 2024-07-24 DIAGNOSIS — I10 PRIMARY HYPERTENSION: ICD-10-CM

## 2024-07-24 DIAGNOSIS — I48.92 ATRIAL FIBRILLATION AND FLUTTER: ICD-10-CM

## 2024-07-24 PROCEDURE — 99214 OFFICE O/P EST MOD 30 MIN: CPT | Performed by: NURSE PRACTITIONER

## 2024-07-24 NOTE — LETTER
July 24, 2024       No Recipients    Patient: Arash Simmons   YOB: 1960   Date of Visit: 7/24/2024     Dear Sundeep Stone MD:       Thank you for referring Arash Simmons to me for evaluation. Below are the relevant portions of my assessment and plan of care.    If you have questions, please do not hesitate to call me. I look forward to following Arash along with you.         Sincerely,        TONY Israel        CC:   No Recipients    Amarilys Vail APRN  07/24/24 1351  Sign when Signing Visit  Subjective:     Encounter Date:07/24/2024    Primary Care Physician: Sunedep Stone MD      Patient ID: Arash Simmons is a 64 y.o. male.    Chief Complaint:Cardiomyopathy    PROBLEM LIST:  Nonischemic cardiomyopathy:   On 04/07/2015, abnormal myocardial perfusion study with evidence of dilated cardiomyopathy, ejection fraction of 16%, large fixed perfusion defect in the anterior apex, consistent with prior myocardial infarction.   On 05/08/2015, cardiac catheterization  with EF of 10% to 15%.  Normal coronary arteries.  Severe left ventricular dilatation.    Echo, 07/08/2015, LVEF 20%.   Status post biventricular ICD placement, August 2015.  9/2020 echo EF 25-30% mild AR. Mild to mod MR. Read by Dr. Orozco.  Biventricular ICD, [employer], 8/2015  ICD shock 7/3/2020 due to SVT/atrial tachycardia at 220 bpm  ICD shock 8/21 due to atrial tachycardia with one-to-one conduction.  (Not A. fib)  Atrial tahcycardia/fib  PVA Dr. Verdugo 5/5/2022.  Recurrent syncope due to atrial tachycardia with one-to-one conduction 11/22  Status post pulmonary vein ablation, 1/26/2023, Sal Verdugo MD  Left bundle branch block.   Hypertension.   Dyslipidemia.   Anxiety.   Cervical spine  Questionable sleep apnea.   Left knee replacement 2017  Abdominal/intestinal surgery as a child.  Elbow surgery  Tonsillectomy and adenoidectomy  Left knee replacement        No Known Allergies      Current Outpatient  Medications:   •  apixaban (Eliquis) 5 MG tablet tablet, Take 1 tablet by mouth Every 12 (Twelve) Hours., Disp: 180 tablet, Rfl: 1  •  atorvastatin (LIPITOR) 10 MG tablet, Take 1 tablet by mouth Every Night., Disp: 90 tablet, Rfl: 1  •  FLUoxetine (PROzac) 20 MG tablet, Take 2 tablets by mouth Daily., Disp: 180 tablet, Rfl: 1  •  furosemide (LASIX) 40 MG tablet, Take 1 tablet by mouth Daily., Disp: 90 tablet, Rfl: 0  •  metoprolol succinate XL (TOPROL-XL) 50 MG 24 hr tablet, TAKE 1 & 1/2 (ONE & ONE-HALF) TABLETS BY MOUTH ONCE DAILY, MAY TAKE EXTRA HALF TABLET AS NEEDED FOR TACHYCARDIA, Disp: 60 tablet, Rfl: 6  •  sacubitril-valsartan (Entresto) 24-26 MG tablet, Take 1 tablet by mouth Every 12 (Twelve) Hours., Disp: 180 tablet, Rfl: 1  •  sotalol (BETAPACE AF) 80 MG tablet tablet, Take 2 tablets by mouth twice daily, Disp: 180 tablet, Rfl: 3  •  spironolactone (ALDACTONE) 25 MG tablet, Take 1 tablet by mouth Daily., Disp: 90 tablet, Rfl: 1        History of Present Illness    Patient is a 64-year-old  male who presents today for annual follow-up of nonischemic cardiomyopathy.  Since last being seen patient notes overall doing well.  Notes that he is getting close to needing a generator change.  Continues to follow with electrophysiology.  He denies any chest pain, pressure, tightness.  He endorses chronic shortness of breath that is overall unchanged.  No reported syncope, presyncope.  Overall feels at his baseline.  Compliant with his medications.    The following portions of the patient's history were reviewed and updated as appropriate: allergies, current medications, past family history, past medical history, past social history, past surgical history and problem list.      Social History     Tobacco Use   • Smoking status: Former     Current packs/day: 0.00     Average packs/day: 2.0 packs/day for 30.0 years (60.0 ttl pk-yrs)     Types: Cigarettes     Start date: 7/27/1969     Quit date: 7/27/1999      "Years since quittin.0     Passive exposure: Past   • Smokeless tobacco: Never   Vaping Use   • Vaping status: Never Used   Substance Use Topics   • Alcohol use: Yes     Alcohol/week: 14.0 standard drinks of alcohol     Types: 14 Cans of beer per week     Comment: 2 beers per day   • Drug use: Never         ROS       Objective:   /68   Pulse 60   Ht 177.8 cm (70\")   Wt 93.2 kg (205 lb 6.4 oz)   SpO2 96%   BMI 29.47 kg/m²         Vitals reviewed.   Constitutional:       Appearance: Well-developed and not in distress.   Neck:      Vascular: No JVD.      Trachea: No tracheal deviation.   Pulmonary:      Effort: Pulmonary effort is normal.      Breath sounds: Normal breath sounds.   Cardiovascular:      Normal rate. Regular rhythm.      Murmurs: There is no murmur.   Edema:     Peripheral edema absent.   Musculoskeletal:         General: No deformity. Skin:     General: Skin is warm and dry.   Neurological:      Mental Status: Alert and oriented to person, place, and time.         Procedures          Assessment:   Assessment & Plan     Diagnoses and all orders for this visit:    1. NICM (nonischemic cardiomyopathy) (Primary), stable.  No evidence of heart failure.  On Entresto.    2. Primary hypertension, well-controlled.  On beta-blocker.    3. Dyslipidemia, stable.  Last LDL less than 100.  On statin.      Plan:  Patient is overall stable from cardiac standpoint.  Continue current cardiac medications.  Continue to follow with electrophysiology.  Follow-up in 1 years time with an echocardiogram or sooner if needed.       TONY Israel         Dictated utilizing Dragon dictation  "

## 2024-08-06 ENCOUNTER — TELEPHONE (OUTPATIENT)
Dept: FAMILY MEDICINE CLINIC | Facility: CLINIC | Age: 64
End: 2024-08-06
Payer: COMMERCIAL

## 2024-08-06 DIAGNOSIS — M79.672 FOOT PAIN, BILATERAL: Primary | ICD-10-CM

## 2024-08-06 DIAGNOSIS — M79.671 FOOT PAIN, BILATERAL: Primary | ICD-10-CM

## 2024-08-06 NOTE — TELEPHONE ENCOUNTER
Caller: Arash Simmons    Relationship: Self    Best call back number: [phone]     What is the medical concern/diagnosis: HAMMER TOE AND BONE STICKING OUT ON BOTH SIDES    What specialty or service is being requested: PODIATRIST    What is the provider, practice or medical service name: [facility]

## 2024-08-12 ENCOUNTER — OFFICE VISIT (OUTPATIENT)
Dept: CARDIOLOGY | Facility: CLINIC | Age: 64
End: 2024-08-12
Payer: COMMERCIAL

## 2024-08-12 VITALS
SYSTOLIC BLOOD PRESSURE: 116 MMHG | HEART RATE: 64 BPM | HEIGHT: 70 IN | BODY MASS INDEX: 29.49 KG/M2 | DIASTOLIC BLOOD PRESSURE: 58 MMHG | OXYGEN SATURATION: 96 % | WEIGHT: 206 LBS

## 2024-08-12 DIAGNOSIS — I10 PRIMARY HYPERTENSION: ICD-10-CM

## 2024-08-12 DIAGNOSIS — I42.8 NICM (NONISCHEMIC CARDIOMYOPATHY): ICD-10-CM

## 2024-08-12 DIAGNOSIS — I48.92 ATRIAL FIBRILLATION AND FLUTTER: Primary | ICD-10-CM

## 2024-08-12 DIAGNOSIS — Z79.899 LONG TERM CURRENT USE OF ANTIARRHYTHMIC DRUG: ICD-10-CM

## 2024-08-12 DIAGNOSIS — I48.91 ATRIAL FIBRILLATION AND FLUTTER: Primary | ICD-10-CM

## 2024-08-12 DIAGNOSIS — I44.7 LBBB (LEFT BUNDLE BRANCH BLOCK): ICD-10-CM

## 2024-08-12 PROBLEM — M17.9 OSTEOARTHRITIS OF KNEE: Status: RESOLVED | Noted: 2017-03-21 | Resolved: 2024-08-12

## 2024-08-12 PROCEDURE — 99213 OFFICE O/P EST LOW 20 MIN: CPT | Performed by: PHYSICIAN ASSISTANT

## 2024-08-12 PROCEDURE — 93284 PRGRMG EVAL IMPLANTABLE DFB: CPT | Performed by: PHYSICIAN ASSISTANT

## 2024-08-12 NOTE — PROGRESS NOTES
Encounter Date:08/12/2024      Patient ID: Arash Simmons is a 64 y.o. male.    Sundeep Stone MD    Cheif Complaint EP: Atrial Fibrillation, Nonischemic cardiomyopathy, and ICD Check    PROBLEM LIST:  Patient Active Problem List    Diagnosis Date Noted    Atrial fibrillation and flutter 01/26/2023     Priority: High     Note Last Updated: 1/29/2024     Catheter ablation of persistent atrial fibrillation with adjunctive ablation of the left atrial posterior wall and the base left atrial appendage for persistent atrial fibrillation. Catheter ablation of SVT #1 a clockwise PAYAM mitral flutter.  Cath ablation of SVT #2 counterclockwise typical right atrial flutter. (1/26/2023)      NICM (nonischemic cardiomyopathy) 06/07/2016     Priority: High     Note Last Updated: 3/21/2022     On 04/07/2015, abnormal myocardial perfusion study with evidence of dilated cardiomyopathy, ejection fraction of 16%, large fixed perfusion defect in the anterior apex, consistent with prior myocardial infarction.   On 05/08/2015, cardiac catheterization  with EF of 10% to 15%.  Normal coronary arteries.  Severe left ventricular dilatation.    Echo, 07/08/2015, LVEF 20%.   Status post biventricular ICD placement, August 2015.  9/2020 echo EF 25-30% mild AR. Mild to mod MR.       Paroxysmal SVT (supraventricular tachycardia) 09/16/2020     Priority: Medium     Note Last Updated: 1/20/2023     Atrial tachycardia      ICD (implantable cardioverter-defibrillator), dual, in situ 09/19/2018     Priority: Medium     Note Last Updated: 3/21/2022     ICD shock 7/3/2020 due to SVT/atrial tachycardia at 220 bpm  ICD shock 8/21 due to atrial tachycardia with one-to-one conduction.  (Not A. Fib)  AT, Sotalol added.      Premature ventricular contractions 07/15/2016     Priority: Medium    Chronic systolic congestive heart failure 06/07/2016     Priority: Medium    Chronic anticoagulation 01/29/2024     Priority: Low    Long term current use of  "antiarrhythmic drug      Priority: Low    LBBB (left bundle branch block) 07/15/2016     Priority: Low    Hypertension 06/07/2016     Priority: Low    Arthritis     Bilateral carpal tunnel syndrome 01/26/2018    Anxiety 06/07/2016    Hyperlipidemia 06/07/2016             History of Present Illness  Patient presents today for follow-up with a history of Atrial fibrillation and flutter status post ablation on long-term anticoagulation and nonischemic cardiomyopathy with a resynchronization ICD.  Returns today for scheduled electrophysiology follow-up.  He is doing quite well.  He continues to work full-time.  He said no awareness of palpitations, no dizziness no syncope.  His blood pressure at home typically runs between 110 to 120 mmHg systolic.  He states compliance with current medical regimen    No Known Allergies    Current Outpatient Medications   Medication Instructions    apixaban (ELIQUIS) 5 mg, Oral, Every 12 Hours    atorvastatin (LIPITOR) 10 mg, Oral, Nightly    FLUoxetine (PROZAC) 40 mg, Oral, Daily    furosemide (LASIX) 40 mg, Oral, Daily    metoprolol succinate XL (TOPROL-XL) 50 MG 24 hr tablet TAKE 1 & 1/2 (ONE & ONE-HALF) TABLETS BY MOUTH ONCE DAILY, MAY TAKE EXTRA HALF TABLET AS NEEDED FOR TACHYCARDIA    sacubitril-valsartan (Entresto) 24-26 MG tablet 1 tablet, Oral, Every 12 Hours    sotalol (BETAPACE AF) 80 MG tablet tablet Take 2 tablets by mouth twice daily    spironolactone (ALDACTONE) 25 mg, Oral, Daily       .    Objective:     /58 (BP Location: Left arm, Patient Position: Sitting, Cuff Size: Adult)   Pulse 64   Ht 177.8 cm (70\")   Wt 93.4 kg (206 lb)   SpO2 96%   BMI 29.56 kg/m²    Body mass index is 29.56 kg/m².     Vitals reviewed.   Constitutional:       Appearance: Well-developed.   Pulmonary:      Effort: Pulmonary effort is normal. No respiratory distress.      Breath sounds: Normal breath sounds. No wheezing. No rales.      Comments: Bases clear  Chest:      Chest wall: Not " tender to palpatation.   Cardiovascular:      Normal rate. Regular rhythm.      Murmurs: There is no murmur.      No gallop.  No click. No rub.   Pulses:     Intact distal pulses.   Edema:     Peripheral edema absent.   Musculoskeletal: Normal range of motion.       Lab Review:     Lab Results   Component Value Date    GLUCOSE 101 (H) 07/19/2024    BUN 17 07/19/2024    CREATININE 0.88 07/19/2024    EGFRRESULT 97 07/19/2024    EGFR 82.6 01/26/2023    BCR 19 07/19/2024    K 4.6 07/19/2024    CO2 22 07/19/2024    CALCIUM 9.1 07/19/2024    PROTENTOTREF 6.4 07/19/2024    ALBUMIN 4.3 07/19/2024    BILITOT 0.8 07/19/2024    AST 17 07/19/2024    ALT 11 07/19/2024     Lab Results   Component Value Date    WBC 5.3 07/19/2024    HGB 12.6 (L) 07/19/2024    HCT 37.4 (L) 07/19/2024    MCV 92 07/19/2024     07/19/2024           Procedures               Assessment:      Diagnosis Plan   1. Atrial fibrillation and flutter  No events on device interrogation, continue current medical regimen      2. NICM (nonischemic cardiomyopathy)  Normal functioning resynchronization ICD with 17% right atrial pacing, 96% RV/LV pacing.  Normal lead parameters and less than 3 months to DORA.      3. LBBB (left bundle branch block)  Normal functioning resynchronization ICD      4. Primary hypertension  Well-managed, continue current medical regimen      5. Long term current use of antiarrhythmic drug  No A-fib or flutter noted on device interrogation continue sotalol at current dose        Plan:     Advance Care Planning   ACP discussion was declined by the patient. Patient has an advance directive (not in EMR), copy requested.           Stable cardiac status.  Continue current medications.   Thank you for allowing us to participate in the care of your patient.     Electronically signed by LIBBY Melendez, 08/12/24, 3:15 PM EDT.

## 2024-09-04 ENCOUNTER — TELEPHONE (OUTPATIENT)
Dept: FAMILY MEDICINE CLINIC | Facility: CLINIC | Age: 64
End: 2024-09-04
Payer: COMMERCIAL

## 2024-09-04 NOTE — TELEPHONE ENCOUNTER
Caller: SUMMER FROST    Relationship: Child    Best call back number: [phone] THIS IS THE PATIENT'S CALL BACK NUMBER    What was the call regarding: PATIENT'S DAUGHTER CALLED STATING THAT THE PATIENT TESTED POSITIVE FOR COVID.  SHE STATED THAT HE WAS PUT ON PAXLOVID.  SHE ASKED IF THERE ARE ANY OF THE PATIENT'S MEDICATIONS THAT HE NEEDS TO STOP WHILE TAKING THE PAXLOVID.

## 2024-09-18 ENCOUNTER — TELEPHONE (OUTPATIENT)
Dept: CARDIOLOGY | Facility: CLINIC | Age: 64
End: 2024-09-18
Payer: COMMERCIAL

## 2024-09-19 ENCOUNTER — TELEPHONE (OUTPATIENT)
Dept: CARDIOLOGY | Facility: CLINIC | Age: 64
End: 2024-09-19
Payer: COMMERCIAL

## 2024-09-19 DIAGNOSIS — I42.8 NICM (NONISCHEMIC CARDIOMYOPATHY): Primary | ICD-10-CM

## 2024-09-19 DIAGNOSIS — I44.7 LBBB (LEFT BUNDLE BRANCH BLOCK): ICD-10-CM

## 2024-09-19 DIAGNOSIS — I48.92 ATRIAL FIBRILLATION AND FLUTTER: ICD-10-CM

## 2024-09-19 DIAGNOSIS — I48.91 ATRIAL FIBRILLATION AND FLUTTER: ICD-10-CM

## 2024-09-25 ENCOUNTER — PREP FOR SURGERY (OUTPATIENT)
Dept: OTHER | Facility: HOSPITAL | Age: 64
End: 2024-09-25
Payer: COMMERCIAL

## 2024-09-25 DIAGNOSIS — Z45.02 ICD (IMPLANTABLE CARDIOVERTER-DEFIBRILLATOR) BATTERY DEPLETION: Primary | ICD-10-CM

## 2024-09-25 RX ORDER — NITROGLYCERIN 0.4 MG/1
0.4 TABLET SUBLINGUAL
OUTPATIENT
Start: 2024-09-25

## 2024-09-25 RX ORDER — SODIUM CHLORIDE 0.9 % (FLUSH) 0.9 %
3 SYRINGE (ML) INJECTION EVERY 12 HOURS SCHEDULED
OUTPATIENT
Start: 2024-09-25

## 2024-09-25 RX ORDER — ONDANSETRON 2 MG/ML
4 INJECTION INTRAMUSCULAR; INTRAVENOUS EVERY 6 HOURS PRN
OUTPATIENT
Start: 2024-09-25

## 2024-09-25 RX ORDER — SODIUM CHLORIDE 9 MG/ML
40 INJECTION, SOLUTION INTRAVENOUS AS NEEDED
OUTPATIENT
Start: 2024-09-25

## 2024-09-25 RX ORDER — ACETAMINOPHEN 325 MG/1
650 TABLET ORAL EVERY 4 HOURS PRN
OUTPATIENT
Start: 2024-09-25

## 2024-09-25 RX ORDER — SODIUM CHLORIDE 0.9 % (FLUSH) 0.9 %
10 SYRINGE (ML) INJECTION AS NEEDED
OUTPATIENT
Start: 2024-09-25

## 2024-09-25 RX ORDER — CEFAZOLIN SODIUM 2 G/100ML
2 INJECTION, SOLUTION INTRAVENOUS ONCE
OUTPATIENT
Start: 2024-09-25 | End: 2024-09-25

## 2024-09-30 RX ORDER — SOTALOL HYDROCHLORIDE 80 MG/1
TABLET ORAL
Qty: 360 TABLET | Refills: 0 | Status: SHIPPED | OUTPATIENT
Start: 2024-09-30

## 2024-10-04 ENCOUNTER — PRE-ADMISSION TESTING (OUTPATIENT)
Dept: PREADMISSION TESTING | Facility: HOSPITAL | Age: 64
End: 2024-10-04
Payer: COMMERCIAL

## 2024-10-04 DIAGNOSIS — Z45.02 ICD (IMPLANTABLE CARDIOVERTER-DEFIBRILLATOR) BATTERY DEPLETION: ICD-10-CM

## 2024-10-04 LAB
ANION GAP SERPL CALCULATED.3IONS-SCNC: 12 MMOL/L (ref 5–15)
BUN SERPL-MCNC: 18 MG/DL (ref 8–23)
BUN/CREAT SERPL: 21.7 (ref 7–25)
CALCIUM SPEC-SCNC: 9.3 MG/DL (ref 8.6–10.5)
CHLORIDE SERPL-SCNC: 103 MMOL/L (ref 98–107)
CO2 SERPL-SCNC: 25 MMOL/L (ref 22–29)
CREAT SERPL-MCNC: 0.83 MG/DL (ref 0.76–1.27)
EGFRCR SERPLBLD CKD-EPI 2021: 98.3 ML/MIN/1.73
GLUCOSE SERPL-MCNC: 94 MG/DL (ref 65–99)
POTASSIUM SERPL-SCNC: 4.5 MMOL/L (ref 3.5–5.2)
SODIUM SERPL-SCNC: 140 MMOL/L (ref 136–145)

## 2024-10-04 PROCEDURE — 36415 COLL VENOUS BLD VENIPUNCTURE: CPT

## 2024-10-04 PROCEDURE — 80048 BASIC METABOLIC PNL TOTAL CA: CPT

## 2024-10-04 NOTE — PAT
An arrival time for procedure was not provided during PAT visit. If patient had any questions or concerns about their arrival time, they were instructed to contact their surgeon/physician.  Additionally, if the patient referred to an arrival time that was acquired from their my chart account, patient was encouraged to verify that time with their surgeon/physician. Arrival times are NOT provided in Pre Admission Testing Department.    Patient to apply Chlorhexadine wipes  to surgical area (as instructed).

## 2024-10-10 ENCOUNTER — HOSPITAL ENCOUNTER (OUTPATIENT)
Facility: HOSPITAL | Age: 64
Discharge: HOME | End: 2024-10-10
Attending: INTERNAL MEDICINE | Admitting: INTERNAL MEDICINE
Payer: COMMERCIAL

## 2024-10-10 VITALS
BODY MASS INDEX: 29.19 KG/M2 | RESPIRATION RATE: 15 BRPM | OXYGEN SATURATION: 95 % | TEMPERATURE: 97 F | WEIGHT: 203.93 LBS | DIASTOLIC BLOOD PRESSURE: 85 MMHG | HEIGHT: 70 IN | HEART RATE: 60 BPM | SYSTOLIC BLOOD PRESSURE: 142 MMHG

## 2024-10-10 DIAGNOSIS — I44.7 LBBB (LEFT BUNDLE BRANCH BLOCK): ICD-10-CM

## 2024-10-10 DIAGNOSIS — Z45.02 ICD (IMPLANTABLE CARDIOVERTER-DEFIBRILLATOR) BATTERY DEPLETION: ICD-10-CM

## 2024-10-10 DIAGNOSIS — I48.92 ATRIAL FIBRILLATION AND FLUTTER: ICD-10-CM

## 2024-10-10 DIAGNOSIS — I42.8 NICM (NONISCHEMIC CARDIOMYOPATHY): ICD-10-CM

## 2024-10-10 DIAGNOSIS — I48.91 ATRIAL FIBRILLATION AND FLUTTER: ICD-10-CM

## 2024-10-10 PROCEDURE — 25010000002 FENTANYL CITRATE (PF) 50 MCG/ML SOLUTION: Performed by: INTERNAL MEDICINE

## 2024-10-10 PROCEDURE — C1882 AICD, OTHER THAN SING/DUAL: HCPCS | Performed by: INTERNAL MEDICINE

## 2024-10-10 PROCEDURE — 93641 EP EVL 1/2CHMB PAC CVDFB TST: CPT | Performed by: INTERNAL MEDICINE

## 2024-10-10 PROCEDURE — 99153 MOD SED SAME PHYS/QHP EA: CPT | Performed by: INTERNAL MEDICINE

## 2024-10-10 PROCEDURE — 25010000002 MIDAZOLAM PER 1 MG: Performed by: INTERNAL MEDICINE

## 2024-10-10 PROCEDURE — 33264 RMVL & RPLCMT DFB GEN MLT LD: CPT | Performed by: INTERNAL MEDICINE

## 2024-10-10 PROCEDURE — 25010000002 CEFAZOLIN PER 500 MG: Performed by: INTERNAL MEDICINE

## 2024-10-10 PROCEDURE — 25010000002 BUPIVACAINE 0.5 % SOLUTION: Performed by: INTERNAL MEDICINE

## 2024-10-10 PROCEDURE — 25810000003 SODIUM CHLORIDE 0.9 % SOLUTION: Performed by: INTERNAL MEDICINE

## 2024-10-10 PROCEDURE — 99152 MOD SED SAME PHYS/QHP 5/>YRS: CPT | Performed by: INTERNAL MEDICINE

## 2024-10-10 PROCEDURE — 25010000002 ONDANSETRON PER 1 MG: Performed by: INTERNAL MEDICINE

## 2024-10-10 PROCEDURE — 25010000002 LIDOCAINE 1 % SOLUTION: Performed by: INTERNAL MEDICINE

## 2024-10-10 DEVICE — CARDIAC RESYNCHRONIZATION THERAPY DEFIBRILLATOR
Type: IMPLANTABLE DEVICE | Site: CHEST | Status: FUNCTIONAL
Brand: VIGILANT™ X4 CRT-D

## 2024-10-10 RX ORDER — FENTANYL CITRATE 50 UG/ML
INJECTION, SOLUTION INTRAMUSCULAR; INTRAVENOUS
Status: DISCONTINUED | OUTPATIENT
Start: 2024-10-10 | End: 2024-10-10 | Stop reason: HOSPADM

## 2024-10-10 RX ORDER — MIDAZOLAM HYDROCHLORIDE 1 MG/ML
INJECTION INTRAMUSCULAR; INTRAVENOUS
Status: DISCONTINUED | OUTPATIENT
Start: 2024-10-10 | End: 2024-10-10 | Stop reason: HOSPADM

## 2024-10-10 RX ORDER — ETOMIDATE 2 MG/ML
INJECTION INTRAVENOUS
Status: DISCONTINUED | OUTPATIENT
Start: 2024-10-10 | End: 2024-10-10 | Stop reason: HOSPADM

## 2024-10-10 RX ORDER — SODIUM CHLORIDE 0.9 % (FLUSH) 0.9 %
3 SYRINGE (ML) INJECTION EVERY 12 HOURS SCHEDULED
Status: DISCONTINUED | OUTPATIENT
Start: 2024-10-10 | End: 2024-10-10 | Stop reason: HOSPADM

## 2024-10-10 RX ORDER — ONDANSETRON 2 MG/ML
INJECTION INTRAMUSCULAR; INTRAVENOUS
Status: DISCONTINUED | OUTPATIENT
Start: 2024-10-10 | End: 2024-10-10 | Stop reason: HOSPADM

## 2024-10-10 RX ORDER — SODIUM CHLORIDE 9 MG/ML
INJECTION, SOLUTION INTRAVENOUS
Status: DISCONTINUED | OUTPATIENT
Start: 2024-10-10 | End: 2024-10-10 | Stop reason: HOSPADM

## 2024-10-10 RX ORDER — SODIUM CHLORIDE 9 MG/ML
40 INJECTION, SOLUTION INTRAVENOUS AS NEEDED
Status: DISCONTINUED | OUTPATIENT
Start: 2024-10-10 | End: 2024-10-10 | Stop reason: HOSPADM

## 2024-10-10 RX ORDER — ACETAMINOPHEN 325 MG/1
650 TABLET ORAL EVERY 4 HOURS PRN
Status: DISCONTINUED | OUTPATIENT
Start: 2024-10-10 | End: 2024-10-10 | Stop reason: HOSPADM

## 2024-10-10 RX ORDER — BUPIVACAINE HYDROCHLORIDE 5 MG/ML
INJECTION, SOLUTION PERINEURAL
Status: DISCONTINUED | OUTPATIENT
Start: 2024-10-10 | End: 2024-10-10 | Stop reason: HOSPADM

## 2024-10-10 RX ORDER — LIDOCAINE HYDROCHLORIDE 10 MG/ML
INJECTION, SOLUTION INFILTRATION; PERINEURAL
Status: DISCONTINUED | OUTPATIENT
Start: 2024-10-10 | End: 2024-10-10 | Stop reason: HOSPADM

## 2024-10-10 RX ORDER — ONDANSETRON 2 MG/ML
4 INJECTION INTRAMUSCULAR; INTRAVENOUS EVERY 6 HOURS PRN
Status: DISCONTINUED | OUTPATIENT
Start: 2024-10-10 | End: 2024-10-10 | Stop reason: HOSPADM

## 2024-10-10 RX ORDER — SODIUM CHLORIDE 0.9 % (FLUSH) 0.9 %
10 SYRINGE (ML) INJECTION AS NEEDED
Status: DISCONTINUED | OUTPATIENT
Start: 2024-10-10 | End: 2024-10-10 | Stop reason: HOSPADM

## 2024-10-10 RX ORDER — NITROGLYCERIN 0.4 MG/1
0.4 TABLET SUBLINGUAL
Status: DISCONTINUED | OUTPATIENT
Start: 2024-10-10 | End: 2024-10-10 | Stop reason: HOSPADM

## 2024-10-10 RX ORDER — CEFAZOLIN SODIUM 2 G/100ML
2 INJECTION, SOLUTION INTRAVENOUS ONCE
Status: DISCONTINUED | OUTPATIENT
Start: 2024-10-10 | End: 2024-10-10

## 2024-10-10 RX ADMIN — SODIUM CHLORIDE 2000 MG: 900 INJECTION INTRAVENOUS at 13:40

## 2024-10-10 NOTE — DISCHARGE INSTRUCTIONS
DR. HAZEL DEVICE GENERATOR CHANGE  Please do not lift more than 10 pounds or raise the affected arm above the shoulder for 2 weeks   after the device was implanted (this does not apply to subcutaneous ICDs).  Avoid activities that involve heavy lifting or rough contact that could result in blows to your   implant site. This allows the incision time to heal.  No showering or getting the incision wet for 3 days post-operatively.   Keep the wound exposed to air unless otherwise indicated, the surgical glue is the bandage.   Do not apply creams, lotions or powders to the incision.   Please avoid allowing a bra strap or suspenders to lay over the incision until it is completely   healed.   No driving for 24 hours post-operatively.   Call your doctor if you have any swelling, redness or discharge around your incision, notice   anything unusual or unexpected or you develop a fever that does not go away in two to three   days.   Take Tylenol and ibuprofen for pain control.  Call your doctor if you hear any beeping sounds/vibratory alerts from your device as this   indicates your device needs to be checked immediately.  You should be scheduled for a wound check in 1 week.   Carry your medical device ID card with you at all times.   Please call our office at [phone] with any questions about the device or incision.

## 2024-10-10 NOTE — OP NOTE
PROCEDURE:   CRT -D GENERATOR REPLACEMENT    OPERATION PERFORMED:     1. Explantation of [employer] resynchronization ICD pulse generator.    2. Testing of retained leads:          A. Atrial lead [employer] model 4473, 921739.        B. Right ventricular lead [employer] model 0293, 097269.          C. Left ventricular lead [employer] model 14 58Q, BPP 570984.    3. Implantation of [employer] resynchronization ICD model G247 pulse generator with serial number [id number].    4. Defibrillation threshold testing.    ATTENDING SURGEON: Sal Verdugo DO    MODERATE SEDATION FOR PROCEDURE:  Moderate sedation was given during this procedure.    I supervised and directed RN to administer this sedation.  This staff member also monitored the patient's hemodynamic and respiratory status and response to these medications.  Please see the full detailed procedure report generated by the electrophysiology laboratory staff.  The patient tolerated moderate sedation well.  There were no complications regarding sedation.  The total dose of fentanyl was 50 mcg and the total dose of midazolam was 2 mg.  The total dose of Brevital was 0 .  First sedation was administered at 2:45 PM and continued through 3:15 PM.    ESTIMATED BLOOD LOSS: less than 20 cc    COMPLICATIONS: None.    TIME OUT: Time out was completed with verification of the correct patient identity, procedure to be performed, procedure site and implanted equipment.  INDICATION: Briefly, Arash Simmons  is a 64 y.o. male with a history of nonischemic cardiomyopathy with a QRS duration of 150 milliseconds, left bundle branch block, severe systolic dysfunction with an LVEF of 30% and NYHA functional class 3 heart failure.    The patient was able to give written informed consent after revisiting the key portions of the risk versus benefit profile of the procedure.  This discussion was framed by our lengthy conversations  (please see our detailed  notes).  Patient verbalized strong understanding of this discussion and a strong desire to proceed with the procedure.  Please note that this detailed informed consent process utilized mutual and shared decision making process between all parties involved, principally the physician and patient, but also potentially with input from the patient's selected family and friends.    Please especially note that the patient has been on maximally tolerated doses of guideline directed medical therapy, including but not limited to: HF indicated beta-blocker (carvedilol, metoprolol succinate), ACE Inhibitor or Angiotensin receptor blocker.  Please note that for some of these medications the maximally tolerated dose may be zero.  The patient has not had a myocardial infarction within 40 days and has not had coronary revascularization within 90 days.    This patient who is a candidate for an ICD has a life expectancy greater than 12 months.    The patient was recently seen in our ICD clinic at which time the patient's device was discovered to be at the elective replacement indicator.   Please see our clinic note for further details.    PROCEDURE AND FINDINGS:  The patient was brought to the electrophysiology suite in a post-absorbtive state.  Informed consent was obtained prior to the procedure and confirmed.  Intravenous prophylactic antibiotics were administered and confirmed to be completely infused prior to start of the procedure.  After the site of implantation was prepped and draped in the usual sterile fashion and after adequate anesthesia was given, the skin was infiltrated with 1% lidocaine and 0.5% bupivicaine 50/50 mixture.  The skin was incised with a #10 scalpel.  Blunt and electrosurgical dissection was carried out to the pulse generator pocket.  The leads were carefully isolated with blunt and electrosurgical dissection.  Careful attention was paid not to damage the leads.  The pulse generator was explanted and the  pocket was copiously irrigated.  Once adequate hemostasis was confirmed within the pocket, the leads were detached from the pulse generator.  The leads were tested for adequate sensing, impedances and pacing thresholds.  The lead tips for all leads were cleaned and dried thoroughly.  The leads were attached to the appropriate ports on the new pulse generator.  Tug testing was performed on all connections.  The device and leads were placed within the pocket such that the coiled redundant leads were posterior to the pulse generator.  Once again, the device was tested for adequate sensing, impedances and pacing thresholds.    DEFIBRILLATION THRESHOLD TESTING:  Once adequate level of anesthesia was obtained, defibrillation testing was performed using Shock on T induction of ventricular fibrillation.  The patient was successfully internal defibrillated with 29 joules with the least sensitivity.  There were no significant dropouts.    The defibrillation threshold was less than or equal to 29 joules.      The pulse generator was then sutured to the fascia in a medial location within the pocket using non absorbable suture.  The pocket was closed with two layers of suture using 2-0 then 3-0 Vicryl, followed by a layer of surgical adhesive and finally a sterile occlusive dressing.    UNDERLYING RHYTHM: Sinus                   MEASURED DEVICE DATA:  Atrial lead                   sensin mV                   impedance:             450 ohms                   Threshold:               1.2 volts at 0.4 ms  RV lead                   sensin mV                   pacing impedance:    400 ohms                   Threshold:            1.8 volts at 0.4 ms                     RV Coil impedance:    80 ohms    LV lead                   sensin mV                   impedance:          500 ohms                   Threshold:           1 volts at 0.4 ms                                                     PROGRAMMED PARAMETERS:    Mode:                                DDDR  Lower Rate:                      60 pulses per minute  Upper Sensor Rate:         130 pulses per minute  Upper Tracking Rate:      130 pulses per minute  Mode Switch Rate:           170 bpm                  Tachy Therapy Programming.                  VT 1 zone 30 second detection interval               Detection:  171 beats per minute                Therapy:    This is a monitor zone    VT 2 zone 9 second section interval               Detection:  200 beats per minute               Therapy:    ATP x2 followed by maximal output defibrillation    VF zone 5 seconds detection interval               Detection:  250 beats per minute               Therapy:    ATP during charge followed by maximal output defibrillation.        CONCLUSION:    1) Successful ICD generator change.    2) Successful DFT testing with adequate DFT safety margin.      Plan:    Patient is to follow up with our clinic in approximately one week and then myself in 3 months.    No lovenox or heparin at any dose is to be given.    FOR THE PATIENT    Please do not lift more than 10 pounds or abduct the shoulder joint / or raise the arm above the shoulder for 6 weeks after device was implanted (this does not apply to subcutaneous ICDs).    Avoid activities that involve heavy lifting or rough contact that could result in blows to your implant site and to allow your incision time to heal.    No shower or getting device incision wet for 2 days post-operatively.    Keep wound exposed to air unless otherwise instructed, the surgical glue is the bandage.    No creams, lotions, or powders to incision.    Please avoid allowing bra strap or suspenders to lay over incision until completely healed.    Avoid hot tubs or pools until incision completely healed.    No driving for 24 hours post-operatively after device implant.    Call your doctor if you have any swelling, redness or  discharge around your incision, notice anything unusual or unexpected, or you develop a fever that does not go away in two or three days.    Call your doctor if you hear any beeping sounds / vibratory alerts from your device as this indicates your device needs to be checked immediately.    Carry your Medical Device ID Card with you at all times.  Please call our office () with any questions about the device or the wounds.

## 2024-10-10 NOTE — H&P
Cardiology H&P     Arash Simmons  1960  [phone]  [phone] (work)    10/10/24    DATE OF ADMISSION: 10/10/2024  [facility] Sundeep Salgado MD  [address] [facility] [place] [address]  Referring Provider: Provider, No Known     CC: Generator Change    PROBLEM LIST:        Patient Active Problem List     Diagnosis Date Noted    Atrial fibrillation and flutter 01/26/2023       Priority: High       Note Last Updated: 1/29/2024       Catheter ablation of persistent atrial fibrillation with adjunctive ablation of the left atrial posterior wall and the base left atrial appendage for persistent atrial fibrillation. Catheter ablation of SVT #1 a clockwise PAYAM mitral flutter.  Cath ablation of SVT #2 counterclockwise typical right atrial flutter. (1/26/2023)       NICM (nonischemic cardiomyopathy) 06/07/2016       Priority: High       Note Last Updated: 3/21/2022       On 04/07/2015, abnormal myocardial perfusion study with evidence of dilated cardiomyopathy, ejection fraction of 16%, large fixed perfusion defect in the anterior apex, consistent with prior myocardial infarction.   On 05/08/2015, cardiac catheterization  with EF of 10% to 15%.  Normal coronary arteries.  Severe left ventricular dilatation.    Echo, 07/08/2015, LVEF 20%.   Status post biventricular ICD placement, August 2015.  9/2020 echo EF 25-30% mild AR. Mild to mod MR.        Paroxysmal SVT (supraventricular tachycardia) 09/16/2020       Priority: Medium       Note Last Updated: 1/20/2023       Atrial tachycardia       ICD (implantable cardioverter-defibrillator), dual, in situ 09/19/2018       Priority: Medium       Note Last Updated: 3/21/2022       ICD shock 7/3/2020 due to SVT/atrial tachycardia at 220 bpm  ICD shock 8/21 due to atrial tachycardia with one-to-one conduction.  (Not A. Fib)  AT, Sotalol added.       Premature ventricular contractions 07/15/2016       Priority: Medium    Chronic systolic congestive  heart failure 06/07/2016       Priority: Medium    Chronic anticoagulation 01/29/2024       Priority: Low    Long term current use of antiarrhythmic drug         Priority: Low    LBBB (left bundle branch block) 07/15/2016       Priority: Low    Hypertension 06/07/2016       Priority: Low    Arthritis      Bilateral carpal tunnel syndrome 01/26/2018    Anxiety 06/07/2016    Hyperlipidemia 06/07/2016               History of Present Illness:   Arash Simmons is a 64-year-old male with above-stated past medical history who presents today for [employer] biventricular ICD generator change.  Per home monitoring remote device transmission, his battery reached DORA on 9/17/2024.  He was instructed to hold his Eliquis for 24 hours prior to the procedure and has done so.  From a cardiac standpoint, he has done very well.  He denies any complaints of chest pain, shortness of breath, syncope, ICD shocks, heart failure symptoms.  He denies any recent illnesses or infections.  No rashes or open wounds.    No Known Allergies    Prior to Admission Medications       Prescriptions Last Dose Informant Patient Reported? Taking?    apixaban (Eliquis) 5 MG tablet tablet 10/9/2024 Self No Yes    Take 1 tablet by mouth Every 12 (Twelve) Hours.    atorvastatin (LIPITOR) 10 MG tablet 10/9/2024 Self No Yes    Take 1 tablet by mouth Every Night.    FLUoxetine (PROzac) 20 MG tablet 10/9/2024 Self No Yes    Take 2 tablets by mouth Daily.    furosemide (LASIX) 40 MG tablet 10/9/2024 Self No Yes    Take 1 tablet by mouth Daily.    metoprolol succinate XL (TOPROL-XL) 50 MG 24 hr tablet 10/9/2024 Self No Yes    TAKE 1 & 1/2 (ONE & ONE-HALF) TABLETS BY MOUTH ONCE DAILY, MAY TAKE EXTRA HALF TABLET AS NEEDED FOR TACHYCARDIA    sacubitril-valsartan (Entresto) 24-26 MG tablet 10/9/2024 Self No Yes    Take 1 tablet by mouth Every 12 (Twelve) Hours.    sotalol (BETAPACE AF) 80 MG tablet tablet 10/9/2024 Self No Yes    Take 2 tablets by mouth twice  daily    Patient taking differently:  Take 2 tablets by mouth Every 12 (Twelve) Hours. Take 2 tablets by mouth twice daily    spironolactone (ALDACTONE) 25 MG tablet 10/9/2024 Self No Yes    Take 1 tablet by mouth Daily.              Current Facility-Administered Medications:     acetaminophen (TYLENOL) tablet 650 mg, 650 mg, Oral, Q4H PRN, Farhan Blackwood PA    ceFAZolin 2000 mg IVPB in 100 mL NS (MBP), 2,000 mg, Intravenous, Once, Sal Verdugo DO    nitroglycerin (NITROSTAT) SL tablet 0.4 mg, 0.4 mg, Sublingual, Q5 Min PRN, Farhan Blackwood PA    ondansetron (ZOFRAN) injection 4 mg, 4 mg, Intravenous, Q6H PRN, Farhan Blackwood PA    sodium chloride 0.9 % flush 10 mL, 10 mL, Intravenous, PRN, Farhan Blackwood PA    sodium chloride 0.9 % flush 3 mL, 3 mL, Intravenous, Q12H, Farhan Blackwood PA    sodium chloride 0.9 % infusion 40 mL, 40 mL, Intravenous, PRN, Farhan Blackwood PA    Social History     Socioeconomic History    Marital status:    Tobacco Use    Smoking status: Former     Current packs/day: 0.00     Average packs/day: 2.0 packs/day for 30.0 years (60.0 ttl pk-yrs)     Types: Cigarettes     Start date: 1969     Quit date: 1999     Years since quittin.2     Passive exposure: Past    Smokeless tobacco: Never   Vaping Use    Vaping status: Never Used   Substance and Sexual Activity    Alcohol use: Yes     Alcohol/week: 14.0 standard drinks of alcohol     Types: 14 Cans of beer per week     Comment: 2 beers per day    Drug use: Never    Sexual activity: Defer     Partners: Female     Comment:        Family History   Problem Relation Age of Onset    COPD Mother     Emphysema Mother     Diabetes Mother     COPD Father     Other Father         black lung        REVIEW OF SYSTEMS:   CONSTITUTIONAL:         No weight loss, fever, chills, weakness or fatigue.   HEENT:                            No visual loss, blurred vision, double vision, yellow sclerae.           "                                   No hearing loss, congestion, sore throat.   SKIN:                                No rashes, urticaria, ulcers, sores.     RESPIRATORY:               No shortness of breath, hemoptysis, cough, sputum.   GI:                                     No anorexia, nausea, vomiting, diarrhea. No abdominal pain, melena.   :                                   No burning on urination, hematuria or increased frequency.  ENDOCRINE:                   No diaphoresis, cold or heat intolerance. No polyuria or polydipsia.   NEURO:                            No headache, dizziness, syncope, paralysis, ataxia, or parasthesias.                                            No change in bowel or bladder control. No history of CVA/TIA  MUSCULOSKELETAL:    No muscle, back pain, joint pain or stiffness.   HEMATOLOGY:               No anemia, bleeding, bruising. No history of DVT/PE.  PSYCH:                            No history of depression, anxiety    Vitals:    10/10/24 1008 10/10/24 1012 10/10/24 1013   BP:  146/86 144/84   BP Location:  Right arm Left arm   Patient Position:  Lying Lying   Pulse:  60    Resp:  16    SpO2:  98%    Weight: 92.5 kg (203 lb 14.8 oz)     Height: 177.8 cm (70\")           Vital Sign Min/Max for last 24 hours  No data recorded   BP  Min: 144/84  Max: 146/86   Pulse  Min: 60  Max: 60   Resp  Min: 16  Max: 16   SpO2  Min: 98 %  Max: 98 %   No data recorded    No intake or output data in the 24 hours ending 10/10/24 1046          Physical Exam:  GEN: Well nourished, Well- developed  No acute distress  HEENT: Normocephalic, Atraumatic, PERRLA, moist mucous membranes  NECK: supple, NO JVD, no thyromegaly, no lymphadenopathy  CARDIAC: S1S2  RRR no murmur, gallop, rub  LUNGS: Clear to ausculation, normal respiratory effort  ABDOMEN: Soft, nontender, normal bowel sounds  EXTREMITIES:No gross deformities,  No clubbing, cyanosis, or edema  SKIN: Warm, dry  NEURO: No focal " deficits  PSYCHIATRIC: Normal affect and mood      I personally viewed and interpreted the patient's EKG/Telemetry/lab data    Data:       Results from last 7 days   Lab Units 10/04/24  0910   SODIUM mmol/L 140   POTASSIUM mmol/L 4.5   CHLORIDE mmol/L 103   CO2 mmol/L 25.0   BUN mg/dL 18   CREATININE mg/dL 0.83   GLUCOSE mg/dL 94      Lab Results   Component Value Date    WBC 5.3 07/19/2024    HGB 12.6 (L) 07/19/2024    HCT 37.4 (L) 07/19/2024    MCV 92 07/19/2024     07/19/2024                                   No intake or output data in the 24 hours ending 10/10/24 1046          Telemetry: BiV paced           Assessment and Plan:   Nonischemic cardiomyopathy:  -[employer] biventricular ICD placed in August 2015 now at [facility].  The patient will undergo generator change today. The risks, benefits, and alternatives of the procedure have been reviewed and the patient wishes to proceed.     2.  Persistent atrial fibrillation:  -Status post catheter ablation 1/26/2023, maintaining normal sinus rhythm, Eliquis on hold for procedure today.            Electronically signed by LIBBY Chaves, 10/10/24, 10:43 AM EDT.

## 2024-10-17 ENCOUNTER — TELEPHONE (OUTPATIENT)
Dept: CARDIOLOGY | Facility: CLINIC | Age: 64
End: 2024-10-17

## 2024-10-17 ENCOUNTER — OFFICE VISIT (OUTPATIENT)
Dept: CARDIOLOGY | Facility: CLINIC | Age: 64
End: 2024-10-17
Payer: COMMERCIAL

## 2024-10-17 DIAGNOSIS — I49.3 PREMATURE VENTRICULAR CONTRACTIONS: Primary | ICD-10-CM

## 2024-10-17 NOTE — TELEPHONE ENCOUNTER
"Pt called with questions about his restrictions post BiV ICD gen change on 10/10/2024. Per his discharge instructions:   \"Please do not lift more than 10 pounds or abduct the shoulder joint / or raise the arm above the shoulder for 6 weeks after device was implanted (this does not apply to subcutaneous ICDs).     Avoid activities that involve heavy lifting or rough contact that could result in blows to your implant site and to allow your incision time to heal.\"      Pt states after his procedure he spoke with you and was told he didn't have these restrictions. I know there are different restrictions post gen change vs initial insertion. Can you please clarify the restrictions for the patient. He works part time in [employer] and would like to return to work.   "

## 2024-10-17 NOTE — PROGRESS NOTES
10/17/2024    Name: Arash Simmons  YOB: 1960    WOUND CHECK    Patient has fever: [] YES   [x] NO     Temperature if indicated:     Wound Location:  Left Shoulder     Surgical Glue: intact     Wound Appearance: clear/intact     Plan: normal wound check      Did patient receive home monitoring box? [x] YES   [] NO  (If no, contact device clinic.)    Does patient have questions about remote monitoring? [] YES   [x] NO (If yes notify device clinic)      Notes:       Appointment for follow-up scheduled for 3 months post procedure []    Future Appointments   Date Time Provider Department Center   1/13/2025 10:00 AM Sal Verdugo DO [facility]   7/30/2025  1:30 PM Amarilys Vail APRN [facility] [address] [facility]          Traci Richey MA, 10/17/24

## 2024-10-18 NOTE — TELEPHONE ENCOUNTER
Called patient to relay information regarding restrictions. Pt verbalized understanding and looks forward to going back to work. Instructed patient to call with any questions or concerns.

## 2024-10-21 ENCOUNTER — TELEPHONE (OUTPATIENT)
Dept: FAMILY MEDICINE CLINIC | Facility: CLINIC | Age: 64
End: 2024-10-21
Payer: COMMERCIAL

## 2024-10-21 RX ORDER — ALBUTEROL SULFATE 90 UG/1
INHALANT RESPIRATORY (INHALATION)
Qty: 18 G | Refills: 1 | Status: SHIPPED | OUTPATIENT
Start: 2024-10-21

## 2024-10-21 NOTE — TELEPHONE ENCOUNTER
Caller: Arash Simmons    Relationship: Self    Best call back number: [phone]    Requested Prescriptions:   PRO AIR INHALER ALBUTEROL     Pharmacy where request should be sent: [facility] [address] [place] [address] [phone] [facility] [phone] FX     Last office visit with prescribing clinician: 7/19/2024   Last telemedicine visit with prescribing clinician: Visit date not found   Next office visit with prescribing clinician: Visit date not found     Additional details provided by patient: THE PATIENT STATES THAT HE HAD THAT PRESCRIBED IN 2017 FOR SHORTNESS OF BREATH HE IS OUT OF THE MEDICATION     Does the patient have less than a 3 day supply:  [x] Yes  [] No    Would you like a call back once the refill request has been completed: [] Yes [x] No    If the office needs to give you a call back, can they leave a voicemail: [] Yes [x] No    Sridevi Alexander Rep   10/21/24 11:47 EDT

## 2024-11-18 RX ORDER — METOPROLOL SUCCINATE 50 MG/1
TABLET, EXTENDED RELEASE ORAL
Qty: 60 TABLET | Refills: 0 | Status: SHIPPED | OUTPATIENT
Start: 2024-11-18

## 2025-01-13 ENCOUNTER — OFFICE VISIT (OUTPATIENT)
Dept: CARDIOLOGY | Facility: CLINIC | Age: 65
End: 2025-01-13
Payer: COMMERCIAL

## 2025-01-13 VITALS
OXYGEN SATURATION: 97 % | BODY MASS INDEX: 29.92 KG/M2 | HEIGHT: 70 IN | SYSTOLIC BLOOD PRESSURE: 108 MMHG | WEIGHT: 209 LBS | DIASTOLIC BLOOD PRESSURE: 58 MMHG | HEART RATE: 62 BPM

## 2025-01-13 DIAGNOSIS — I42.8 NICM (NONISCHEMIC CARDIOMYOPATHY): ICD-10-CM

## 2025-01-13 DIAGNOSIS — I48.91 ATRIAL FIBRILLATION AND FLUTTER: Primary | ICD-10-CM

## 2025-01-13 DIAGNOSIS — Z95.810 ICD (IMPLANTABLE CARDIOVERTER-DEFIBRILLATOR), DUAL, IN SITU: ICD-10-CM

## 2025-01-13 DIAGNOSIS — I48.92 ATRIAL FIBRILLATION AND FLUTTER: Primary | ICD-10-CM

## 2025-01-13 PROCEDURE — 99214 OFFICE O/P EST MOD 30 MIN: CPT | Performed by: PHYSICIAN ASSISTANT

## 2025-01-13 PROCEDURE — 93000 ELECTROCARDIOGRAM COMPLETE: CPT | Performed by: PHYSICIAN ASSISTANT

## 2025-01-13 PROCEDURE — 93284 PRGRMG EVAL IMPLANTABLE DFB: CPT | Performed by: PHYSICIAN ASSISTANT

## 2025-01-13 NOTE — PROGRESS NOTES
Patient ID: Arash Simmons is a 64 y.o. male.    Sundeep Stone MD    Cheif Complaint EP: Atrial Fibrillation, Nonischemic cardiomyopathy, and ICD Check    PROBLEM LIST:  Patient Active Problem List    Diagnosis Date Noted    NICM (nonischemic cardiomyopathy) 06/07/2016     Priority: High     Note Last Updated: 3/21/2022     On 04/07/2015, abnormal myocardial perfusion study with evidence of dilated cardiomyopathy, ejection fraction of 16%, large fixed perfusion defect in the anterior apex, consistent with prior myocardial infarction.   On 05/08/2015, cardiac catheterization  with EF of 10% to 15%.  Normal coronary arteries.  Severe left ventricular dilatation.    Echo, 07/08/2015, LVEF 20%.   Status post biventricular ICD placement, August 2015.  9/2020 echo EF 25-30% mild AR. Mild to mod MR.       Nonischemic cardiomyopathy 10/10/2024    Chronic anticoagulation 01/29/2024    Atrial fibrillation and flutter 01/26/2023     Note Last Updated: 1/29/2024     Catheter ablation of persistent atrial fibrillation with adjunctive ablation of the left atrial posterior wall and the base left atrial appendage for persistent atrial fibrillation. Catheter ablation of SVT #1 a clockwise PAYAM mitral flutter.  Cath ablation of SVT #2 counterclockwise typical right atrial flutter. (1/26/2023)      Arthritis     Long term current use of antiarrhythmic drug     Paroxysmal SVT (supraventricular tachycardia) 09/16/2020     Note Last Updated: 1/20/2023     Atrial tachycardia      ICD (implantable cardioverter-defibrillator), dual, in situ 09/19/2018     Note Last Updated: 3/21/2022     ICD shock 7/3/2020 due to SVT/atrial tachycardia at 220 bpm  ICD shock 8/21 due to atrial tachycardia with one-to-one conduction.  (Not A. Fib)  AT, Sotalol added.      Bilateral carpal tunnel syndrome 01/26/2018    Premature ventricular contractions 07/15/2016    LBBB (left bundle branch block) 07/15/2016    Anxiety 06/07/2016    Chronic  "systolic congestive heart failure 06/07/2016    Hypertension 06/07/2016    Hyperlipidemia 06/07/2016             History of Present Illness  64-year-old gentleman presents today for follow-up regarding nonischemic or myopathy, [employer] BiV ICD interrogation atrial fibrillation and hypertension.  He is status post successful generator change 3 months ago.  He states his device site healed well.  He states he is not in heart failure.  He denies any ICD shocks.  He states he is not having worsening chest pain shortness of breath dizziness near syncope or syncope.  He is tolerant of his medications and doing well with these.  No Known Allergies  .    Objective:     /58 (BP Location: Left arm, Patient Position: Sitting, Cuff Size: Adult)   Pulse 62   Ht 177.8 cm (70\")   Wt 94.8 kg (209 lb)   SpO2 97%   BMI 29.99 kg/m²    Body mass index is 29.99 kg/m².     Vitals reviewed.   Constitutional:       Appearance: Well-developed.   Pulmonary:      Effort: Pulmonary effort is normal. No respiratory distress.      Breath sounds: Normal breath sounds. No wheezing. No rales.      Comments: Bases clear  Chest:      Chest wall: Not tender to palpatation.   Cardiovascular:      Normal rate. Regular rhythm.      Murmurs: There is no murmur.      No gallop.  No click. No rub.   Pulses:     Intact distal pulses.   Edema:     Peripheral edema absent.   Musculoskeletal: Normal range of motion.       Lab Review:     Lab Results   Component Value Date    GLUCOSE 94 10/04/2024    BUN 18 10/04/2024    CREATININE 0.83 10/04/2024    EGFRRESULT 97 07/19/2024    EGFR 98.3 10/04/2024    BCR 21.7 10/04/2024    K 4.5 10/04/2024    CO2 25.0 10/04/2024    CALCIUM 9.3 10/04/2024    PROTENTOTREF 6.4 07/19/2024    ALBUMIN 4.3 07/19/2024    BILITOT 0.8 07/19/2024    AST 17 07/19/2024    ALT 11 07/19/2024     Lab Results   Component Value Date    WBC 5.3 07/19/2024    HGB 12.6 (L) 07/19/2024    HCT 37.4 (L) 07/19/2024    MCV 92 " 07/19/2024     07/19/2024       [employer] BiV ICD: Recent generator change October 10, 2024.  Interrogation today DDDR.  Rate 60.  A paced 7%, CRT 98%.  Normal thresholds impedances.  Battery voltage 10 years.  No A-fib.      ECG 12 Lead    Date/Time: 1/13/2025 10:31 AM  Performed by: Farhan Colón PA    Authorized by: Farhan Colón PA  Comparison: compared with previous ECG from 7/24/2024  Similar to previous ECG  Rhythm: sinus rhythm and paced  Rate: normal  Conduction: conduction normal  QRS axis: normal    Clinical impression: normal ECG                     Assessment:      Diagnosis Plan   1. Atrial fibrillation and flutter  Previous PVA January 2023..  No events on device interrogation, continue current medical regimen.  He is on sotalol therapy EKG stable QTc normal.      2. NICM (nonischemic cardiomyopathy)  Euvolemic continue guideline directed medical therapy      3. LBBB (left bundle branch block)  Normal functioning resynchronization ICD      4. Primary hypertension  Controlled on Toprol, Aldactone and Entresto therapy.      5. Long term current use of antiarrhythmic drug  No A-fib or flutter noted on device interrogation continue sotalol.         Plan:   Continue current medical therapy turn follow-up in 1 year or sooner as needed.  Electronically signed by LIBBY Enriquez, 01/13/25, 10:29 AM EST.

## 2025-01-30 DIAGNOSIS — I50.22 CHRONIC SYSTOLIC CONGESTIVE HEART FAILURE: ICD-10-CM

## 2025-01-30 RX ORDER — FUROSEMIDE 40 MG/1
40 TABLET ORAL DAILY
Qty: 90 TABLET | Refills: 0 | Status: SHIPPED | OUTPATIENT
Start: 2025-01-30

## 2025-02-13 PROCEDURE — 93295 DEV INTERROG REMOTE 1/2/MLT: CPT | Performed by: INTERNAL MEDICINE

## 2025-02-13 PROCEDURE — 93296 REM INTERROG EVL PM/IDS: CPT | Performed by: INTERNAL MEDICINE

## 2025-02-24 DIAGNOSIS — F41.9 ANXIETY: ICD-10-CM

## 2025-02-24 RX ORDER — FLUOXETINE 20 MG/1
40 TABLET, FILM COATED ORAL DAILY
Qty: 180 TABLET | Refills: 0 | OUTPATIENT
Start: 2025-02-24

## 2025-03-17 DIAGNOSIS — I10 ESSENTIAL HYPERTENSION: ICD-10-CM

## 2025-03-17 DIAGNOSIS — I50.22 CHRONIC SYSTOLIC CONGESTIVE HEART FAILURE: ICD-10-CM

## 2025-03-17 RX ORDER — SPIRONOLACTONE 25 MG/1
25 TABLET ORAL DAILY
Qty: 90 TABLET | Refills: 0 | OUTPATIENT
Start: 2025-03-17

## 2025-04-10 DIAGNOSIS — F41.9 ANXIETY: ICD-10-CM

## 2025-04-10 RX ORDER — FLUOXETINE 20 MG/1
40 TABLET, FILM COATED ORAL DAILY
Qty: 30 TABLET | Refills: 0 | Status: SHIPPED | OUTPATIENT
Start: 2025-04-10

## 2025-04-11 RX ORDER — SOTALOL HYDROCHLORIDE 80 MG/1
TABLET ORAL
Qty: 360 TABLET | Refills: 2 | Status: SHIPPED | OUTPATIENT
Start: 2025-04-11

## 2025-04-11 NOTE — TELEPHONE ENCOUNTER
Caller: TroyArash    Relationship: Self    Best call back number: [phone]     Requested Prescriptions:   Requested Prescriptions     Pending Prescriptions Disp Refills    sotalol (BETAPACE AF) 80 MG tablet tablet 360 tablet 0     Sig: Take 2 tablets by mouth twice daily        Pharmacy where request should be sent: [facility] [address] [place] [address] [phone] [facility] [phone] FX     Last office visit with prescribing clinician: 1/13/2025   Last telemedicine visit with prescribing clinician: Visit date not found   Next office visit with prescribing clinician: 1/19/2026     Additional details provided by patient: PATIENT ONLY HAS 1 PILL LEFT    Does the patient have less than a 3 day supply:  [x] Yes  [] No    Would you like a call back once the refill request has been completed: [] Yes [x] No    If the office needs to give you a call back, can they leave a voicemail: [] Yes [x] No    Sridevi Gambino Rep   04/11/25 14:20 EDT

## 2025-04-17 ENCOUNTER — TELEPHONE (OUTPATIENT)
Dept: CARDIOLOGY | Facility: CLINIC | Age: 65
End: 2025-04-17
Payer: COMMERCIAL

## 2025-04-17 NOTE — TELEPHONE ENCOUNTER
I submitted a PA for Sotalol AF 80 mg PO BID through [employer].        Arash Simmons (Key: [id number])      Your information has been sent to [facility]

## 2025-04-20 DIAGNOSIS — I48.19 ATRIAL FIBRILLATION, PERSISTENT: ICD-10-CM

## 2025-04-20 DIAGNOSIS — I50.22 CHRONIC SYSTOLIC CONGESTIVE HEART FAILURE: ICD-10-CM

## 2025-04-22 RX ORDER — SACUBITRIL AND VALSARTAN 24; 26 MG/1; MG/1
1 TABLET, FILM COATED ORAL EVERY 12 HOURS
Qty: 180 TABLET | Refills: 0 | Status: SHIPPED | OUTPATIENT
Start: 2025-04-22

## 2025-04-22 RX ORDER — APIXABAN 5 MG/1
5 TABLET, FILM COATED ORAL
Qty: 180 TABLET | Refills: 0 | Status: SHIPPED | OUTPATIENT
Start: 2025-04-22

## 2025-05-02 ENCOUNTER — OFFICE VISIT (OUTPATIENT)
Dept: FAMILY MEDICINE CLINIC | Facility: CLINIC | Age: 65
End: 2025-05-02
Payer: COMMERCIAL

## 2025-05-02 VITALS
HEART RATE: 70 BPM | HEIGHT: 70 IN | SYSTOLIC BLOOD PRESSURE: 132 MMHG | WEIGHT: 207 LBS | DIASTOLIC BLOOD PRESSURE: 80 MMHG | BODY MASS INDEX: 29.63 KG/M2 | OXYGEN SATURATION: 97 % | RESPIRATION RATE: 18 BRPM | TEMPERATURE: 97.8 F

## 2025-05-02 DIAGNOSIS — I10 ESSENTIAL HYPERTENSION: ICD-10-CM

## 2025-05-02 DIAGNOSIS — R06.02 SHORTNESS OF BREATH: ICD-10-CM

## 2025-05-02 DIAGNOSIS — I48.19 ATRIAL FIBRILLATION, PERSISTENT: ICD-10-CM

## 2025-05-02 DIAGNOSIS — E78.00 PURE HYPERCHOLESTEROLEMIA: ICD-10-CM

## 2025-05-02 DIAGNOSIS — Z12.5 SCREENING FOR PROSTATE CANCER: ICD-10-CM

## 2025-05-02 DIAGNOSIS — I50.22 CHRONIC SYSTOLIC CONGESTIVE HEART FAILURE: Primary | ICD-10-CM

## 2025-05-02 DIAGNOSIS — F41.9 ANXIETY: ICD-10-CM

## 2025-05-02 RX ORDER — SPIRONOLACTONE 25 MG/1
25 TABLET ORAL DAILY
Qty: 90 TABLET | Refills: 1 | Status: SHIPPED | OUTPATIENT
Start: 2025-05-02

## 2025-05-02 RX ORDER — FUROSEMIDE 40 MG/1
40 TABLET ORAL DAILY
Qty: 90 TABLET | Refills: 1 | Status: SHIPPED | OUTPATIENT
Start: 2025-05-02

## 2025-05-02 RX ORDER — SACUBITRIL AND VALSARTAN 24; 26 MG/1; MG/1
1 TABLET, FILM COATED ORAL EVERY 12 HOURS
Qty: 180 TABLET | Refills: 1 | Status: SHIPPED | OUTPATIENT
Start: 2025-05-02

## 2025-05-02 RX ORDER — FLUOXETINE 20 MG/1
40 TABLET, FILM COATED ORAL DAILY
Qty: 90 TABLET | Refills: 1 | Status: SHIPPED | OUTPATIENT
Start: 2025-05-02

## 2025-05-02 RX ORDER — ATORVASTATIN CALCIUM 10 MG/1
10 TABLET, FILM COATED ORAL NIGHTLY
Qty: 90 TABLET | Refills: 1 | Status: SHIPPED | OUTPATIENT
Start: 2025-05-02

## 2025-05-02 NOTE — PROGRESS NOTES
Subjective   Arash Simmons is a 64 y.o. male.     Congestive Heart Failure  Associated symptoms include shortness of breath. Pertinent negatives include no chest pain or palpitations.        He has noted that he has been getting more and more SOA  Has not had heart palpitations with this, no CP  Just harder to be as active as he used to be  He has a Hx of CHF and Afib  No change in meds recently, no weight gain  EF 20% in past      His mood has been doing well on prozaxc  Happy with the progress with this medicine  Wants to continue this         Arash Simmons returns today for follow up of Hyperlipidemia  Arash indicates his exercise level as irregularly.  Diet: unchanged  Patient is compliant with medications   Any side effects to medications:   chest pain No myalgia No memory change No  Pt is due for labs      The following portions of the patient's history were reviewed and updated as appropriate: allergies, current medications, past family history, past medical history, past social history, past surgical history, and problem list.    Review of Systems   Constitutional: Negative.    Respiratory:  Positive for shortness of breath.    Cardiovascular: Negative.  Negative for chest pain and palpitations.   Psychiatric/Behavioral: Negative.         Objective   Physical Exam  Vitals and nursing note reviewed.   Constitutional:       General: He is not in acute distress.     Appearance: Normal appearance. He is well-developed.   Cardiovascular:      Rate and Rhythm: Normal rate and regular rhythm.      Heart sounds: Normal heart sounds.   Pulmonary:      Effort: Pulmonary effort is normal.      Breath sounds: Normal breath sounds.   Neurological:      Mental Status: He is alert and oriented to person, place, and time.   Psychiatric:         Mood and Affect: Mood normal.         Behavior: Behavior normal.         Thought Content: Thought content normal.         Judgment: Judgment normal.       Assessment & Plan    Diagnoses and all orders for this visit:    1. Chronic systolic congestive heart failure (Primary)  -     Comprehensive Metabolic Panel  -     proBNP  -     sacubitril-valsartan (Entresto) 24-26 MG tablet; Take 1 tablet by mouth Every 12 (Twelve) Hours.  Dispense: 180 tablet; Refill: 1  -     furosemide (LASIX) 40 MG tablet; Take 1 tablet by mouth Daily.  Dispense: 90 tablet; Refill: 1  -     spironolactone (ALDACTONE) 25 MG tablet; Take 1 tablet by mouth Daily.  Dispense: 90 tablet; Refill: 1    2. Anxiety  -     FLUoxetine (PROzac) 20 MG tablet; Take 2 tablets by mouth Daily.  Dispense: 90 tablet; Refill: 1    3. Atrial fibrillation, persistent  -     apixaban (Eliquis) 5 MG tablet tablet; Take 1 tablet by mouth Every 12 (Twelve) Hours.  Dispense: 180 tablet; Refill: 1    4. Pure hypercholesterolemia  -     Comprehensive Metabolic Panel  -     Lipid Panel  -     atorvastatin (LIPITOR) 10 MG tablet; Take 1 tablet by mouth Every Night.  Dispense: 90 tablet; Refill: 1    5. Essential hypertension  -     CBC & Differential  -     Comprehensive Metabolic Panel  -     spironolactone (ALDACTONE) 25 MG tablet; Take 1 tablet by mouth Daily.  Dispense: 90 tablet; Refill: 1    6. Shortness of breath  -     Complete PFT - Pre & Post Bronchodilator; Future    7. Screening for prostate cancer  -     PSA Screen    No change in chronic meds for heart.  Will check PFTs to evaluate lungs for SOA as well as ProBNP.  Consider ECHO if all else normal,. But will check these tests first for his SOA    No change in cholesterol medicine, recheck lipids    Continue prozac, mood doing well

## 2025-05-03 LAB
ALBUMIN SERPL-MCNC: 4.4 G/DL (ref 3.5–5.2)
ALBUMIN/GLOB SERPL: 1.8 G/DL
ALP SERPL-CCNC: 94 U/L (ref 39–117)
ALT SERPL-CCNC: 13 U/L (ref 1–41)
AST SERPL-CCNC: 20 U/L (ref 1–40)
BASOPHILS # BLD AUTO: 0.04 10*3/MM3 (ref 0–0.2)
BASOPHILS NFR BLD AUTO: 0.7 % (ref 0–1.5)
BILIRUB SERPL-MCNC: 0.7 MG/DL (ref 0–1.2)
BUN SERPL-MCNC: 15 MG/DL (ref 8–23)
BUN/CREAT SERPL: 15.2 (ref 7–25)
CALCIUM SERPL-MCNC: 9.2 MG/DL (ref 8.6–10.5)
CHLORIDE SERPL-SCNC: 104 MMOL/L (ref 98–107)
CHOLEST SERPL-MCNC: 149 MG/DL (ref 0–200)
CO2 SERPL-SCNC: 23.8 MMOL/L (ref 22–29)
CREAT SERPL-MCNC: 0.99 MG/DL (ref 0.76–1.27)
EGFRCR SERPLBLD CKD-EPI 2021: 85.6 ML/MIN/1.73
EOSINOPHIL # BLD AUTO: 0.21 10*3/MM3 (ref 0–0.4)
EOSINOPHIL NFR BLD AUTO: 3.9 % (ref 0.3–6.2)
ERYTHROCYTE [DISTWIDTH] IN BLOOD BY AUTOMATED COUNT: 12 % (ref 12.3–15.4)
GLOBULIN SER CALC-MCNC: 2.5 GM/DL
GLUCOSE SERPL-MCNC: 87 MG/DL (ref 65–99)
HCT VFR BLD AUTO: 40.8 % (ref 37.5–51)
HDLC SERPL-MCNC: 42 MG/DL (ref 40–60)
HGB BLD-MCNC: 13.6 G/DL (ref 13–17.7)
IMM GRANULOCYTES # BLD AUTO: 0.02 10*3/MM3 (ref 0–0.05)
IMM GRANULOCYTES NFR BLD AUTO: 0.4 % (ref 0–0.5)
LDLC SERPL CALC-MCNC: 92 MG/DL (ref 0–100)
LYMPHOCYTES # BLD AUTO: 1.26 10*3/MM3 (ref 0.7–3.1)
LYMPHOCYTES NFR BLD AUTO: 23.3 % (ref 19.6–45.3)
MCH RBC QN AUTO: 31.3 PG (ref 26.6–33)
MCHC RBC AUTO-ENTMCNC: 33.3 G/DL (ref 31.5–35.7)
MCV RBC AUTO: 94 FL (ref 79–97)
MONOCYTES # BLD AUTO: 0.58 10*3/MM3 (ref 0.1–0.9)
MONOCYTES NFR BLD AUTO: 10.7 % (ref 5–12)
NEUTROPHILS # BLD AUTO: 3.29 10*3/MM3 (ref 1.7–7)
NEUTROPHILS NFR BLD AUTO: 61 % (ref 42.7–76)
NRBC BLD AUTO-RTO: 0 /100 WBC (ref 0–0.2)
NT-PROBNP SERPL-MCNC: 470 PG/ML (ref 0–210)
PLATELET # BLD AUTO: 219 10*3/MM3 (ref 140–450)
POTASSIUM SERPL-SCNC: 4.5 MMOL/L (ref 3.5–5.2)
PROT SERPL-MCNC: 6.9 G/DL (ref 6–8.5)
PSA SERPL-MCNC: 1.42 NG/ML (ref 0–4)
RBC # BLD AUTO: 4.34 10*6/MM3 (ref 4.14–5.8)
SODIUM SERPL-SCNC: 141 MMOL/L (ref 136–145)
TRIGL SERPL-MCNC: 78 MG/DL (ref 0–150)
VLDLC SERPL CALC-MCNC: 15 MG/DL (ref 5–40)
WBC # BLD AUTO: 5.4 10*3/MM3 (ref 3.4–10.8)

## 2025-05-16 ENCOUNTER — HOSPITAL ENCOUNTER (OUTPATIENT)
Dept: PULMONOLOGY | Facility: HOSPITAL | Age: 65
Discharge: HOME | End: 2025-05-16
Payer: COMMERCIAL

## 2025-05-16 DIAGNOSIS — R06.02 SHORTNESS OF BREATH: ICD-10-CM

## 2025-05-16 PROCEDURE — 94664 DEMO&/EVAL PT USE INHALER: CPT

## 2025-05-16 PROCEDURE — 94640 AIRWAY INHALATION TREATMENT: CPT

## 2025-05-16 PROCEDURE — 94729 DIFFUSING CAPACITY: CPT

## 2025-05-16 PROCEDURE — 94060 EVALUATION OF WHEEZING: CPT

## 2025-05-16 PROCEDURE — 94727 GAS DIL/WSHOT DETER LNG VOL: CPT

## 2025-05-16 RX ORDER — ALBUTEROL SULFATE 0.83 MG/ML
2.5 SOLUTION RESPIRATORY (INHALATION) ONCE
Status: COMPLETED | OUTPATIENT
Start: 2025-05-16 | End: 2025-05-16

## 2025-05-16 RX ADMIN — ALBUTEROL SULFATE 2.5 MG: 2.5 SOLUTION RESPIRATORY (INHALATION) at 06:54

## 2025-05-28 ENCOUNTER — TELEPHONE (OUTPATIENT)
Dept: FAMILY MEDICINE CLINIC | Facility: CLINIC | Age: 65
End: 2025-05-28
Payer: COMMERCIAL

## 2025-05-28 NOTE — TELEPHONE ENCOUNTER
Caller: Arash Simmons    Relationship: Self    Best call back number: [phone]     Caller requesting test results: PATIENT    What test was performed: PFT    When was the test performed: 5/16/25    Where was the test performed: [facility]    Additional notes: PHONE CALL PLEASE

## 2025-05-30 NOTE — TELEPHONE ENCOUNTER
Caller: Arash Simmons    Relationship: Self    Best call back number: [phone]       What was the call regarding: PATIENT IS CALLING IN REGARDS TO THE PFT THAT WAS DONE ON 05/16/25. HE STILL HAS NOT BEEN CONTACTED WITH THE RESULTS OF THIS TEST AND WOULD LIKE A CALL BACK TO DISCUSS THEM.

## 2025-07-24 LAB
MC_CV_MDC_IDC_RATE_1: 180
MC_CV_MDC_IDC_RATE_1: 220
MC_CV_MDC_IDC_RATE_1: 250
MC_CV_MDC_IDC_SHOCK_MEASURED_IMPEDANCE: 78
MC_CV_MDC_IDC_THERAPIES: NORMAL
MC_CV_MDC_IDC_THERAPIES: NORMAL
MC_CV_MDC_IDC_ZONE_ID: 1
MC_CV_MDC_IDC_ZONE_ID: 2
MC_CV_MDC_IDC_ZONE_ID: 3
MDC_IDC_MSMT_BATTERY_REMAINING_LONGEVITY: 120 MO
MDC_IDC_MSMT_BATTERY_REMAINING_PERCENTAGE: 100 %
MDC_IDC_MSMT_BATTERY_STATUS: NORMAL
MDC_IDC_MSMT_CAP_CHARGE_TIME: 10.3
MDC_IDC_MSMT_LEADCHNL_LV_DTM: NORMAL
MDC_IDC_MSMT_LEADCHNL_LV_IMPEDANCE_VALUE: 623
MDC_IDC_MSMT_LEADCHNL_LV_PACING_THRESHOLD_AMPLITUDE: 0.5
MDC_IDC_MSMT_LEADCHNL_LV_PACING_THRESHOLD_POLARITY: NORMAL
MDC_IDC_MSMT_LEADCHNL_LV_PACING_THRESHOLD_PULSEWIDTH: 0.4
MDC_IDC_MSMT_LEADCHNL_RA_DTM: NORMAL
MDC_IDC_MSMT_LEADCHNL_RA_IMPEDANCE_VALUE: 456
MDC_IDC_MSMT_LEADCHNL_RA_PACING_THRESHOLD_AMPLITUDE: 1
MDC_IDC_MSMT_LEADCHNL_RA_PACING_THRESHOLD_POLARITY: NORMAL
MDC_IDC_MSMT_LEADCHNL_RA_PACING_THRESHOLD_PULSEWIDTH: 0.4
MDC_IDC_MSMT_LEADCHNL_RA_SENSING_INTR_AMPL: 4.1
MDC_IDC_MSMT_LEADCHNL_RV_DTM: NORMAL
MDC_IDC_MSMT_LEADCHNL_RV_IMPEDANCE_VALUE: 448
MDC_IDC_MSMT_LEADCHNL_RV_PACING_THRESHOLD_AMPLITUDE: 1.9
MDC_IDC_MSMT_LEADCHNL_RV_PACING_THRESHOLD_POLARITY: NORMAL
MDC_IDC_MSMT_LEADCHNL_RV_PACING_THRESHOLD_PULSEWIDTH: 0.4
MDC_IDC_PG_IMPLANT_DTM: NORMAL
MDC_IDC_PG_MFG: NORMAL
MDC_IDC_PG_MODEL: NORMAL
MDC_IDC_PG_SERIAL: NORMAL
MDC_IDC_PG_TYPE: NORMAL
MDC_IDC_SESS_DTM: NORMAL
MDC_IDC_SESS_TYPE: NORMAL
MDC_IDC_SET_BRADY_AT_MODE_SWITCH_RATE: 170
MDC_IDC_SET_BRADY_LOWRATE: 60
MDC_IDC_SET_BRADY_MAX_SENSOR_RATE: 130
MDC_IDC_SET_BRADY_MAX_TRACKING_RATE: 130
MDC_IDC_SET_BRADY_MODE: NORMAL
MDC_IDC_SET_BRADY_PAV_DELAY: 150
MDC_IDC_SET_BRADY_SAV_DELAY: 120
MDC_IDC_SET_CRT_LVRV_DELAY: 0
MDC_IDC_SET_CRT_PACED_CHAMBERS: NORMAL
MDC_IDC_SET_LEADCHNL_LV_PACING_AMPLITUDE: 1.7
MDC_IDC_SET_LEADCHNL_LV_PACING_PULSEWIDTH: 0.4
MDC_IDC_SET_LEADCHNL_RA_PACING_AMPLITUDE: 2
MDC_IDC_SET_LEADCHNL_RA_PACING_POLARITY: NORMAL
MDC_IDC_SET_LEADCHNL_RA_PACING_PULSEWIDTH: 0.4
MDC_IDC_SET_LEADCHNL_RA_SENSING_POLARITY: NORMAL
MDC_IDC_SET_LEADCHNL_RA_SENSING_SENSITIVITY: 0.25
MDC_IDC_SET_LEADCHNL_RV_PACING_AMPLITUDE: 3.5
MDC_IDC_SET_LEADCHNL_RV_PACING_POLARITY: NORMAL
MDC_IDC_SET_LEADCHNL_RV_PACING_PULSEWIDTH: 0.4
MDC_IDC_SET_LEADCHNL_RV_SENSING_POLARITY: NORMAL
MDC_IDC_SET_LEADCHNL_RV_SENSING_SENSITIVITY: 0.6
MDC_IDC_SET_ZONE_STATUS: NORMAL
MDC_IDC_SET_ZONE_TYPE: NORMAL
MDC_IDC_STAT_AT_BURDEN_PERCENT: 0
MDC_IDC_STAT_BRADY_RA_PERCENT_PACED: 14
MDC_IDC_STAT_BRADY_RV_PERCENT_PACED: 100
MDC_IDC_STAT_CRT_LV_PERCENT_PACED: 99
MDC_IDC_STAT_TACHYTHERAPY_ATP_DELIVERED_RECENT: 0
MDC_IDC_STAT_TACHYTHERAPY_SHOCKS_ABORTED_RECENT: 0
MDC_IDC_STAT_TACHYTHERAPY_SHOCKS_DELIVERED_RECENT: 0

## 2025-07-25 DIAGNOSIS — F41.9 ANXIETY: ICD-10-CM

## 2025-07-25 RX ORDER — FLUOXETINE 20 MG/1
40 TABLET, FILM COATED ORAL DAILY
Qty: 90 TABLET | Refills: 0 | Status: SHIPPED | OUTPATIENT
Start: 2025-07-25

## 2025-07-30 ENCOUNTER — OFFICE VISIT (OUTPATIENT)
Dept: CARDIOLOGY | Facility: CLINIC | Age: 65
End: 2025-07-30
Payer: COMMERCIAL

## 2025-07-30 VITALS
BODY MASS INDEX: 29.72 KG/M2 | OXYGEN SATURATION: 95 % | HEART RATE: 59 BPM | HEIGHT: 70 IN | DIASTOLIC BLOOD PRESSURE: 64 MMHG | WEIGHT: 207.6 LBS | SYSTOLIC BLOOD PRESSURE: 112 MMHG

## 2025-07-30 DIAGNOSIS — I50.22 CHRONIC SYSTOLIC CONGESTIVE HEART FAILURE: ICD-10-CM

## 2025-07-30 DIAGNOSIS — I42.8 NICM (NONISCHEMIC CARDIOMYOPATHY): Primary | ICD-10-CM

## 2025-07-30 DIAGNOSIS — E78.5 DYSLIPIDEMIA: ICD-10-CM

## 2025-07-30 DIAGNOSIS — I10 PRIMARY HYPERTENSION: ICD-10-CM

## 2025-07-30 NOTE — LETTER
July 30, 2025     Sundeep Stone MD  [address]  [place]  [facility] [address]    Patient: Arash Simmons   YOB: 1960   Date of Visit: 7/30/2025     Dear Sundeep Stone MD:       Thank you for referring Arash Simmons to me for evaluation. Below are the relevant portions of my assessment and plan of care.    If you have questions, please do not hesitate to call me. I look forward to following Arash along with you.         Sincerely,        TONY Israel        CC: No Recipients    Amarilys Vail APRN  07/30/25 1509  Sign when Signing Visit  Subjective:     Encounter Date:07/30/2025    Primary Care Physician: Sundeep Stone MD      Patient ID: Arash Simmons is a 65 y.o. male.    Chief Complaint:Follow-up (1 year )      PROBLEM LIST:  Nonischemic cardiomyopathy:   On 04/07/2015, abnormal myocardial perfusion study with evidence of dilated cardiomyopathy, ejection fraction of 16%, large fixed perfusion defect in the anterior apex, consistent with prior myocardial infarction.   On 05/08/2015, cardiac catheterization  with EF of 10% to 15%.  Normal coronary arteries.  Severe left ventricular dilatation.    Echo, 07/08/2015, LVEF 20%.   Status post biventricular ICD placement, August 2015.  9/2020 echo EF 25-30% mild AR. Mild to mod MR. Read by Dr. Orozco.  Biventricular ICD, [employer], 8/2015  ICD shock 7/3/2020 due to SVT/atrial tachycardia at 220 bpm  ICD shock 8/21 due to atrial tachycardia with one-to-one conduction.  (Not A. fib)  10/2024 generator change Dr. Verdugo  Atrial tahcycardia/fib  PVA Dr. Verdugo 5/5/2022.  Recurrent syncope due to atrial tachycardia with one-to-one conduction 11/22  Status post pulmonary vein ablation, 1/26/2023, Sal Verdugo MD  Left bundle branch block.   Hypertension.   Dyslipidemia.   COPD  Anxiety.   Cervical spine  Questionable sleep apnea.   Left knee replacement 2017  Abdominal/intestinal surgery as a child.  Elbow surgery  Tonsillectomy and  adenoidectomy  Left knee replacement        No Known Allergies      Current Outpatient Medications:   •  albuterol sulfate  (90 Base) MCG/ACT inhaler, 1-2 puffs q 4-6 hours PRN, Disp: 18 g, Rfl: 1  •  apixaban (Eliquis) 5 MG tablet tablet, Take 1 tablet by mouth Every 12 (Twelve) Hours., Disp: 180 tablet, Rfl: 1  •  atorvastatin (LIPITOR) 10 MG tablet, Take 1 tablet by mouth Every Night., Disp: 90 tablet, Rfl: 1  •  FLUoxetine (PROzac) 20 MG tablet, Take 2 tablets by mouth once daily, Disp: 90 tablet, Rfl: 0  •  Fluticasone-Umeclidin-Vilant (Trelegy Ellipta) 100-62.5-25 MCG/ACT inhaler, Inhale 1 puff Daily., Disp: 1 each, Rfl: 5  •  furosemide (LASIX) 40 MG tablet, Take 1 tablet by mouth Daily., Disp: 90 tablet, Rfl: 1  •  metoprolol succinate XL (TOPROL-XL) 50 MG 24 hr tablet, TAKE 1 & 1/2 (ONE & ONE-HALF) TABLETS BY MOUTH ONCE DAILY *MAY  TAKE  AN  EXTRA  ONE-HALF  TABLET  AS  NEEDED  FOR  TACHYCARDIA*, Disp: 60 tablet, Rfl: 0  •  sacubitril-valsartan (Entresto) 24-26 MG tablet, Take 1 tablet by mouth Every 12 (Twelve) Hours., Disp: 180 tablet, Rfl: 1  •  sotalol (BETAPACE AF) 80 MG tablet tablet, Take 2 tablets by mouth twice daily, Disp: 360 tablet, Rfl: 2  •  spironolactone (ALDACTONE) 25 MG tablet, Take 1 tablet by mouth Daily., Disp: 90 tablet, Rfl: 1        History of Present Illness    Patient is a 65-year-old  male who presents today for annual follow-up of chronic HFrEF and nonischemic cardiomyopathy.  Since last being seen patient notes overall doing well from cardiac standpoint.  He still follows with electrophysiology for his atrial arrhythmias and underwent generator change since last being seen.  He is also been diagnosed with COPD and notes that he has been started on Trelegy which has been helping with his shortness of breath.  He was scheduled for an echocardiogram but missed this appointment.  He denies any chest pain, pressure.  Denies any increasing shortness of breath.  No  "reported syncope, presyncope, or edema.  States he is compliant with his medications.    The following portions of the patient's history were reviewed and updated as appropriate: allergies, current medications, past family history, past medical history, past social history, past surgical history and problem list.      Social History     Tobacco Use   • Smoking status: Former     Current packs/day: 0.00     Average packs/day: 2.0 packs/day for 30.0 years (60.0 ttl pk-yrs)     Types: Cigarettes     Start date: 1969     Quit date: 1999     Years since quittin.0     Passive exposure: Past   • Smokeless tobacco: Never   Vaping Use   • Vaping status: Never Used   Substance Use Topics   • Alcohol use: Yes     Alcohol/week: 14.0 standard drinks of alcohol     Types: 14 Cans of beer per week     Comment: 2 beers per day   • Drug use: Never         ROS       Objective:   /64   Pulse 59   Ht 177.8 cm (70\")   Wt 94.2 kg (207 lb 9.6 oz)   SpO2 95%   BMI 29.79 kg/m²         Vitals reviewed.   Constitutional:       Appearance: Well-developed and not in distress.   Neck:      Vascular: No JVD.      Trachea: No tracheal deviation.   Pulmonary:      Effort: Pulmonary effort is normal.      Breath sounds: Normal breath sounds.   Cardiovascular:      Normal rate. Regular rhythm.      Murmurs: There is no murmur.   Edema:     Peripheral edema absent.   Musculoskeletal:         General: No deformity. Skin:     General: Skin is warm and dry.   Neurological:      Mental Status: Alert and oriented to person, place, and time.         Procedures          Assessment:   Assessment & Plan     Diagnoses and all orders for this visit:    1. NICM (nonischemic cardiomyopathy) (Primary), stable.  No current anginal complaints.  On Entresto.    2. Chronic systolic congestive heart failure, stable.  On spironolactone.    3. Primary hypertension, well-controlled.  On beta-blocker.    4. Dyslipidemia, stable.  On statin.  Labs " with primary care.      Plan:  Patient is overall stable from cardiac standpoint.  Check echocardiogram in the near term to follow-up on LVEF.  Continue current cardiac medications.  Follow-up in 1 years time or sooner if needed.       Amarilys JIMENEZ             Dictated utilizing Dragon dictation

## 2025-07-30 NOTE — PROGRESS NOTES
Subjective:     Encounter Date:07/30/2025    Primary Care Physician: Sundeep Stone MD      Patient ID: Arsah Simmons is a 65 y.o. male.    Chief Complaint:Follow-up (1 year )      PROBLEM LIST:  Nonischemic cardiomyopathy:   On 04/07/2015, abnormal myocardial perfusion study with evidence of dilated cardiomyopathy, ejection fraction of 16%, large fixed perfusion defect in the anterior apex, consistent with prior myocardial infarction.   On 05/08/2015, cardiac catheterization  with EF of 10% to 15%.  Normal coronary arteries.  Severe left ventricular dilatation.    Echo, 07/08/2015, LVEF 20%.   Status post biventricular ICD placement, August 2015.  9/2020 echo EF 25-30% mild AR. Mild to mod MR. Read by Dr. Orozco.  Biventricular ICD, [employer], 8/2015  ICD shock 7/3/2020 due to SVT/atrial tachycardia at 220 bpm  ICD shock 8/21 due to atrial tachycardia with one-to-one conduction.  (Not A. fib)  10/2024 generator change Dr. Verdugo  Atrial tahcycardia/fib  PVA Dr. Verdugo 5/5/2022.  Recurrent syncope due to atrial tachycardia with one-to-one conduction 11/22  Status post pulmonary vein ablation, 1/26/2023, Sal Verdugo MD  Left bundle branch block.   Hypertension.   Dyslipidemia.   COPD  Anxiety.   Cervical spine  Questionable sleep apnea.   Left knee replacement 2017  Abdominal/intestinal surgery as a child.  Elbow surgery  Tonsillectomy and adenoidectomy  Left knee replacement        No Known Allergies      Current Outpatient Medications:     albuterol sulfate  (90 Base) MCG/ACT inhaler, 1-2 puffs q 4-6 hours PRN, Disp: 18 g, Rfl: 1    apixaban (Eliquis) 5 MG tablet tablet, Take 1 tablet by mouth Every 12 (Twelve) Hours., Disp: 180 tablet, Rfl: 1    atorvastatin (LIPITOR) 10 MG tablet, Take 1 tablet by mouth Every Night., Disp: 90 tablet, Rfl: 1    FLUoxetine (PROzac) 20 MG tablet, Take 2 tablets by mouth once daily, Disp: 90 tablet, Rfl: 0    Fluticasone-Umeclidin-Vilant (Trelegy Ellipta) 100-62.5-25  MCG/ACT inhaler, Inhale 1 puff Daily., Disp: 1 each, Rfl: 5    furosemide (LASIX) 40 MG tablet, Take 1 tablet by mouth Daily., Disp: 90 tablet, Rfl: 1    metoprolol succinate XL (TOPROL-XL) 50 MG 24 hr tablet, TAKE 1 & 1/2 (ONE & ONE-HALF) TABLETS BY MOUTH ONCE DAILY *MAY  TAKE  AN  EXTRA  ONE-HALF  TABLET  AS  NEEDED  FOR  TACHYCARDIA*, Disp: 60 tablet, Rfl: 0    sacubitril-valsartan (Entresto) 24-26 MG tablet, Take 1 tablet by mouth Every 12 (Twelve) Hours., Disp: 180 tablet, Rfl: 1    sotalol (BETAPACE AF) 80 MG tablet tablet, Take 2 tablets by mouth twice daily, Disp: 360 tablet, Rfl: 2    spironolactone (ALDACTONE) 25 MG tablet, Take 1 tablet by mouth Daily., Disp: 90 tablet, Rfl: 1        History of Present Illness    Patient is a 65-year-old  male who presents today for annual follow-up of chronic HFrEF and nonischemic cardiomyopathy.  Since last being seen patient notes overall doing well from cardiac standpoint.  He still follows with electrophysiology for his atrial arrhythmias and underwent generator change since last being seen.  He is also been diagnosed with COPD and notes that he has been started on Trelegy which has been helping with his shortness of breath.  He was scheduled for an echocardiogram but missed this appointment.  He denies any chest pain, pressure.  Denies any increasing shortness of breath.  No reported syncope, presyncope, or edema.  States he is compliant with his medications.    The following portions of the patient's history were reviewed and updated as appropriate: allergies, current medications, past family history, past medical history, past social history, past surgical history and problem list.      Social History     Tobacco Use    Smoking status: Former     Current packs/day: 0.00     Average packs/day: 2.0 packs/day for 30.0 years (60.0 ttl pk-yrs)     Types: Cigarettes     Start date: 1969     Quit date: 1999     Years since quittin.0     Passive  "exposure: Past    Smokeless tobacco: Never   Vaping Use    Vaping status: Never Used   Substance Use Topics    Alcohol use: Yes     Alcohol/week: 14.0 standard drinks of alcohol     Types: 14 Cans of beer per week     Comment: 2 beers per day    Drug use: Never         ROS       Objective:   /64   Pulse 59   Ht 177.8 cm (70\")   Wt 94.2 kg (207 lb 9.6 oz)   SpO2 95%   BMI 29.79 kg/m²         Vitals reviewed.   Constitutional:       Appearance: Well-developed and not in distress.   Neck:      Vascular: No JVD.      Trachea: No tracheal deviation.   Pulmonary:      Effort: Pulmonary effort is normal.      Breath sounds: Normal breath sounds.   Cardiovascular:      Normal rate. Regular rhythm.      Murmurs: There is no murmur.   Edema:     Peripheral edema absent.   Musculoskeletal:         General: No deformity. Skin:     General: Skin is warm and dry.   Neurological:      Mental Status: Alert and oriented to person, place, and time.         Procedures          Assessment:   Assessment & Plan      Diagnoses and all orders for this visit:    1. NICM (nonischemic cardiomyopathy) (Primary), stable.  No current anginal complaints.  On Entresto.    2. Chronic systolic congestive heart failure, stable.  On spironolactone.    3. Primary hypertension, well-controlled.  On beta-blocker.    4. Dyslipidemia, stable.  On statin.  Labs with primary care.      Plan:  Patient is overall stable from cardiac standpoint.  Check echocardiogram in the near term to follow-up on LVEF.  Continue current cardiac medications.  Follow-up in 1 years time or sooner if needed.       Amarilys JIMENEZ             Dictated utilizing Dragon dictation  "

## 2025-08-12 LAB
MC_CV_MDC_IDC_RATE_1: 180
MC_CV_MDC_IDC_RATE_1: 220
MC_CV_MDC_IDC_RATE_1: 250
MC_CV_MDC_IDC_SHOCK_MEASURED_IMPEDANCE: 82
MC_CV_MDC_IDC_THERAPIES: NORMAL
MC_CV_MDC_IDC_THERAPIES: NORMAL
MC_CV_MDC_IDC_ZONE_ID: 1
MC_CV_MDC_IDC_ZONE_ID: 2
MC_CV_MDC_IDC_ZONE_ID: 3
MDC_IDC_MSMT_BATTERY_REMAINING_LONGEVITY: 114 MO
MDC_IDC_MSMT_BATTERY_REMAINING_PERCENTAGE: 100 %
MDC_IDC_MSMT_BATTERY_STATUS: NORMAL
MDC_IDC_MSMT_CAP_CHARGE_TIME: 10.3
MDC_IDC_MSMT_LEADCHNL_LV_DTM: NORMAL
MDC_IDC_MSMT_LEADCHNL_LV_IMPEDANCE_VALUE: 606
MDC_IDC_MSMT_LEADCHNL_LV_PACING_THRESHOLD_AMPLITUDE: 0.5
MDC_IDC_MSMT_LEADCHNL_LV_PACING_THRESHOLD_POLARITY: NORMAL
MDC_IDC_MSMT_LEADCHNL_LV_PACING_THRESHOLD_PULSEWIDTH: 0.4
MDC_IDC_MSMT_LEADCHNL_LV_SENSING_INTR_AMPL: 1.4
MDC_IDC_MSMT_LEADCHNL_RA_DTM: NORMAL
MDC_IDC_MSMT_LEADCHNL_RA_IMPEDANCE_VALUE: 506
MDC_IDC_MSMT_LEADCHNL_RA_PACING_THRESHOLD_AMPLITUDE: 0.9
MDC_IDC_MSMT_LEADCHNL_RA_PACING_THRESHOLD_POLARITY: NORMAL
MDC_IDC_MSMT_LEADCHNL_RA_PACING_THRESHOLD_PULSEWIDTH: 0.4
MDC_IDC_MSMT_LEADCHNL_RA_SENSING_INTR_AMPL: 3.3
MDC_IDC_MSMT_LEADCHNL_RV_DTM: NORMAL
MDC_IDC_MSMT_LEADCHNL_RV_IMPEDANCE_VALUE: 519
MDC_IDC_MSMT_LEADCHNL_RV_PACING_THRESHOLD_AMPLITUDE: 1.9
MDC_IDC_MSMT_LEADCHNL_RV_PACING_THRESHOLD_POLARITY: NORMAL
MDC_IDC_MSMT_LEADCHNL_RV_PACING_THRESHOLD_PULSEWIDTH: 0.4
MDC_IDC_MSMT_LEADCHNL_RV_SENSING_INTR_AMPL: 23.3
MDC_IDC_PG_IMPLANT_DTM: NORMAL
MDC_IDC_PG_MFG: NORMAL
MDC_IDC_PG_MODEL: NORMAL
MDC_IDC_PG_SERIAL: NORMAL
MDC_IDC_PG_TYPE: NORMAL
MDC_IDC_SESS_DTM: NORMAL
MDC_IDC_SESS_TYPE: NORMAL
MDC_IDC_SET_BRADY_AT_MODE_SWITCH_RATE: 170
MDC_IDC_SET_BRADY_LOWRATE: 60
MDC_IDC_SET_BRADY_MAX_SENSOR_RATE: 130
MDC_IDC_SET_BRADY_MAX_TRACKING_RATE: 130
MDC_IDC_SET_BRADY_MODE: NORMAL
MDC_IDC_SET_BRADY_PAV_DELAY: 150
MDC_IDC_SET_BRADY_SAV_DELAY: 120
MDC_IDC_SET_CRT_LVRV_DELAY: 0
MDC_IDC_SET_CRT_PACED_CHAMBERS: NORMAL
MDC_IDC_SET_LEADCHNL_LV_PACING_AMPLITUDE: 1.7
MDC_IDC_SET_LEADCHNL_LV_PACING_PULSEWIDTH: 0.4
MDC_IDC_SET_LEADCHNL_RA_PACING_AMPLITUDE: 2
MDC_IDC_SET_LEADCHNL_RA_PACING_POLARITY: NORMAL
MDC_IDC_SET_LEADCHNL_RA_PACING_PULSEWIDTH: 0.4
MDC_IDC_SET_LEADCHNL_RA_SENSING_POLARITY: NORMAL
MDC_IDC_SET_LEADCHNL_RA_SENSING_SENSITIVITY: 0.25
MDC_IDC_SET_LEADCHNL_RV_PACING_AMPLITUDE: 3.5
MDC_IDC_SET_LEADCHNL_RV_PACING_POLARITY: NORMAL
MDC_IDC_SET_LEADCHNL_RV_PACING_PULSEWIDTH: 0.4
MDC_IDC_SET_LEADCHNL_RV_SENSING_POLARITY: NORMAL
MDC_IDC_SET_LEADCHNL_RV_SENSING_SENSITIVITY: 0.6
MDC_IDC_SET_ZONE_STATUS: NORMAL
MDC_IDC_SET_ZONE_TYPE: NORMAL
MDC_IDC_STAT_AT_BURDEN_PERCENT: 0
MDC_IDC_STAT_BRADY_RA_PERCENT_PACED: 14
MDC_IDC_STAT_BRADY_RV_PERCENT_PACED: 100
MDC_IDC_STAT_CRT_LV_PERCENT_PACED: 99
MDC_IDC_STAT_TACHYTHERAPY_ATP_DELIVERED_RECENT: 0
MDC_IDC_STAT_TACHYTHERAPY_SHOCKS_ABORTED_RECENT: 0
MDC_IDC_STAT_TACHYTHERAPY_SHOCKS_DELIVERED_RECENT: 0

## (undated) DEVICE — SOL NACL 0.9PCT 1000ML

## (undated) DEVICE — ST INF PRI SMRTSTE 20DRP 2VLV 24ML 117

## (undated) DEVICE — ANTIBACTERIAL UNDYED BRAIDED (POLYGLACTIN 910), SYNTHETIC ABSORBABLE SUTURE: Brand: COATED VICRYL

## (undated) DEVICE — GW TRNSEP SAFESEPT LT/ATRIAL RO 135CM .014IN

## (undated) DEVICE — 2963 MEDIPORE SOFT CLOTH TAPE 3 IN X 10 YD 12 RLS/CS: Brand: 3M™ MEDIPORE™

## (undated) DEVICE — LOCATION REFERENCE PATCH KIT: Brand: RHYTHMIA™ MAPPING SYSTEM

## (undated) DEVICE — CVR HNDL LT SURG ACCSSRY BLU STRL

## (undated) DEVICE — MEDI-VAC NON-CONDUCTIVE SUCTION TUBING: Brand: CARDINAL HEALTH

## (undated) DEVICE — SET PRIMARY GRVTY 10DP MALE LL 104IN

## (undated) DEVICE — ELECTRD BLD EDGE/INSUL1P 2.4X5.1MM STRL

## (undated) DEVICE — ST EXT IV SMRTSTE 2VLV FIX M LL 6ML 41

## (undated) DEVICE — AMD ANTIMICROBIAL GAUZE SPONGES,12 PLY USP TYPE VII, 0.2% POLYHEXAMETHYLENE BIGUANIDE HCI (PHMB): Brand: CURITY

## (undated) DEVICE — SYS CLS VASC/VENI VASCADE MVP 6TO12F

## (undated) DEVICE — SYS TRNSEP ACC BRK ACROSS A/ 71CM

## (undated) DEVICE — INTRO SHEATH TRNSEP .032 SL0 8.5F 63CM

## (undated) DEVICE — 450 ML BOTTLE OF 0.05% CHLORHEXIDINE GLUCONATE IN 99.95% STERILE WATER FOR IRRIGATION, USP AND APPLICATOR.: Brand: IRRISEPT ANTIMICROBIAL WOUND LAVAGE

## (undated) DEVICE — TB SXN FRAZIER 8F STRL

## (undated) DEVICE — SYRINGE,PISTON,IRRIGATION,60ML,STERILE: Brand: MEDLINE

## (undated) DEVICE — 3.0MM PRECISION NEURO (MATCH HEAD)

## (undated) DEVICE — LEX ELECTRO PHYSIOLOGY: Brand: MEDLINE INDUSTRIES, INC.

## (undated) DEVICE — MEDI-VAC YANKAUER SUCTION HANDLE W/BULBOUS TIP: Brand: CARDINAL HEALTH

## (undated) DEVICE — SYS TRNSEP ACC BRK ACROSS A/ 18G 98CM

## (undated) DEVICE — Device: Brand: WEBSTER CS

## (undated) DEVICE — DIAGNOSTIC ELECTRODE CATHETER: Brand: WOVEN

## (undated) DEVICE — INTRO SHEATH ENGAGE W/50 GW .038 8F12

## (undated) DEVICE — CATH ULTRASND ECHOCARDIOGRAPHY ACUNAV

## (undated) DEVICE — SKYLINE ANTERIOR CERVICAL PLATE SYSTEM THREADED TEMPORARY FIX, PIN: Brand: SKYLINE

## (undated) DEVICE — Device: Brand: MEDEX

## (undated) DEVICE — LIMB HOLDER, WRIST/ANKLE: Brand: DEROYAL

## (undated) DEVICE — EXTENSION TAB: Brand: DEROYAL

## (undated) DEVICE — ADULT NASAL CO2 SAMPLING WITH O2 DELIVERY CANNULA FOR CAPNOFLEX MODULE: Brand: VITAL SIGNS™

## (undated) DEVICE — LARYNG GLIDESCOPE COBALT/RANGER GVL3ST

## (undated) DEVICE — ST EXT IV SMARTSITE 2VLV SP M LL 5ML IV1

## (undated) DEVICE — PROB S-CATH TEMP ESOPH 10F

## (undated) DEVICE — SUT SILK 2/0 TIES 18IN A185H

## (undated) DEVICE — DRSNG TELFA PAD NONADH STR 1S 3X8IN

## (undated) DEVICE — GLV SURG SENSICARE W/ALOE PF LF 9 STRL

## (undated) DEVICE — OPEN-IRRIGATION TUBING SET: Brand: METRIQ™ IRRIGATION TUBING SET

## (undated) DEVICE — DIFFUSER: Brand: CORE, MAESTRO

## (undated) DEVICE — ST EXT IV LG BORE NDLESS FLTR LL 17IN

## (undated) DEVICE — TONSIL SPONGES: Brand: DEROYAL

## (undated) DEVICE — PLASMABLADE PS210-030S 3.0S LOCK: Brand: PLASMABLADE™

## (undated) DEVICE — INTRO SHEATH ART/FEM ENGAGE .038 6F12CM

## (undated) DEVICE — AIRWY SZ11

## (undated) DEVICE — INTRO STEER AGILIS NXT MED/CURL 8.5F

## (undated) DEVICE — INTRO SHEATH FAST/CATH LG/LUM 11F .038IN 12CM

## (undated) DEVICE — ELECTRD RETRN/GRND MEGADYNE SGL/PLT W/CORD 9FT DISP

## (undated) DEVICE — JACKSON-PRATT 100CC BULB RESERVOIR: Brand: CARDINAL HEALTH

## (undated) DEVICE — 3M™ DURAPORE™ SURGICAL TAPE 1538-3, 3 INCH X 10 YARD (7,5CM X 9,1M), 4 ROLLS/BOX: Brand: 3M™ DURAPORE™

## (undated) DEVICE — DECANT BG O JET

## (undated) DEVICE — ADULT, W/LG. BACK PAD, RADIOTRANSPARENT ELEMENT AND LEAD WIRE: Brand: DEFIBRILLATION ELECTRODES

## (undated) DEVICE — KT MANIFLD EP

## (undated) DEVICE — PAD ARMBRD SURG CONVOL 7.5X20X2IN

## (undated) DEVICE — PRESSURE MONITORING SET: Brand: TRUWAVE

## (undated) DEVICE — DOME MONITORING W BONDED STPCK BIOTRANS2

## (undated) DEVICE — PAD,NON-ADHERENT,3X8,STERILE,LF,1/PK: Brand: MEDLINE

## (undated) DEVICE — SKYLINE ANTERIOR CERVICAL PLATE SYSTEM DRILL 2.2 X 12MM: Brand: SKYLINE

## (undated) DEVICE — MAGNETIC DRAPE: Brand: DEVON

## (undated) DEVICE — DRSNG SURESITE123 4X4.8IN

## (undated) DEVICE — HIGH RESOLUTION MAPPING CATHETER: Brand: INTELLAMAP ORION™

## (undated) DEVICE — LO CONTOUR COLLAR: Brand: DEROYAL

## (undated) DEVICE — GAUZE,SPONGE,4"X4",16PLY,XRAY,STRL,LF: Brand: MEDLINE

## (undated) DEVICE — RETR ESOPH ESOSURE W/TEMP/CONTRL NITNL STY 27F

## (undated) DEVICE — TBG SXN ESOPH ENSOETM FOR/BLANETROL/1/2

## (undated) DEVICE — 3M™ STERI-STRIP™ REINFORCED ADHESIVE SKIN CLOSURES, R1547, 1/2 IN X 4 IN (12 MM X 100 MM), 6 STRIPS/ENVELOPE: Brand: 3M™ STERI-STRIP™

## (undated) DEVICE — OIL CARTRIDGE: Brand: CORE, MAESTRO

## (undated) DEVICE — SNAP KOVER: Brand: UNBRANDED

## (undated) DEVICE — DRAIN JACKSON PRATT 10FR 7MM: Brand: CARDINAL HEALTH

## (undated) DEVICE — PK SPINE ORTHO 10

## (undated) DEVICE — ABLATION CATHETER: Brand: INTELLANAV MIFI™ OPEN-IRRIGATED

## (undated) DEVICE — ADULT, W/LG. BACK PAD, RADIOTRANSPARENT ELEMENT AND LEAD WIRE COMPATIBLE W/: Brand: DEFIBRILLATION ELECTRODES

## (undated) DEVICE — GOWN,REINF,POLY,ECL,PP SLV,3XL,XLONG: Brand: MEDLINE

## (undated) DEVICE — ADHS LIQ MASTISOL 2/3ML

## (undated) DEVICE — TUBING, SUCTION, 1/4" X 10', STRAIGHT: Brand: MEDLINE

## (undated) DEVICE — KITTNER SPONGE: Brand: DEROYAL

## (undated) DEVICE — CAUTERY TIP POLISHER: Brand: DEVON

## (undated) DEVICE — CANNULA,OXY,ADULT,SUPERSOFT,W/7'TUB,UC: Brand: MEDLINE

## (undated) DEVICE — STERILE (15.2 TAPERED TO 7.6 X 183CM) POLYETHYLENE ACCORDION-FOLDED COVER FOR USE WITH SIEMENS ACUNAV ULTRASOUND CATHETER FAMILY CONNECTOR: Brand: SWIFTLINK TRANSDUCER COVER

## (undated) DEVICE — INTENDED USE FOR SURGICAL MARKING ON INTACT SKIN, ALSO PROVIDES A PERMANENT METHOD OF IDENTIFYING OBJECTS IN THE OPERATING ROOM: Brand: WRITESITE® REGULAR TIP SKIN MARKER